# Patient Record
Sex: FEMALE | Race: WHITE | NOT HISPANIC OR LATINO | URBAN - METROPOLITAN AREA
[De-identification: names, ages, dates, MRNs, and addresses within clinical notes are randomized per-mention and may not be internally consistent; named-entity substitution may affect disease eponyms.]

---

## 2024-04-09 ENCOUNTER — INPATIENT (INPATIENT)
Facility: HOSPITAL | Age: 74
LOS: 9 days | Discharge: NURSING, REHAB OR LONG-TERM CARE | DRG: 536 | End: 2024-04-19
Attending: INTERNAL MEDICINE | Admitting: INTERNAL MEDICINE
Payer: MEDICARE

## 2024-04-09 VITALS
OXYGEN SATURATION: 86 % | SYSTOLIC BLOOD PRESSURE: 175 MMHG | TEMPERATURE: 98 F | DIASTOLIC BLOOD PRESSURE: 102 MMHG | RESPIRATION RATE: 25 BRPM | HEART RATE: 97 BPM | HEIGHT: 66 IN | WEIGHT: 87.96 LBS

## 2024-04-09 DIAGNOSIS — S72.002A FRACTURE OF UNSPECIFIED PART OF NECK OF LEFT FEMUR, INITIAL ENCOUNTER FOR CLOSED FRACTURE: ICD-10-CM

## 2024-04-09 DIAGNOSIS — Z29.9 ENCOUNTER FOR PROPHYLACTIC MEASURES, UNSPECIFIED: ICD-10-CM

## 2024-04-09 DIAGNOSIS — J44.9 CHRONIC OBSTRUCTIVE PULMONARY DISEASE, UNSPECIFIED: ICD-10-CM

## 2024-04-09 DIAGNOSIS — I10 ESSENTIAL (PRIMARY) HYPERTENSION: ICD-10-CM

## 2024-04-09 DIAGNOSIS — S72.009A FRACTURE OF UNSPECIFIED PART OF NECK OF UNSPECIFIED FEMUR, INITIAL ENCOUNTER FOR CLOSED FRACTURE: ICD-10-CM

## 2024-04-09 DIAGNOSIS — Z98.890 OTHER SPECIFIED POSTPROCEDURAL STATES: Chronic | ICD-10-CM

## 2024-04-09 DIAGNOSIS — F17.200 NICOTINE DEPENDENCE, UNSPECIFIED, UNCOMPLICATED: ICD-10-CM

## 2024-04-09 DIAGNOSIS — F41.9 ANXIETY DISORDER, UNSPECIFIED: ICD-10-CM

## 2024-04-09 DIAGNOSIS — E03.9 HYPOTHYROIDISM, UNSPECIFIED: ICD-10-CM

## 2024-04-09 DIAGNOSIS — M48.00 SPINAL STENOSIS, SITE UNSPECIFIED: ICD-10-CM

## 2024-04-09 DIAGNOSIS — R06.02 SHORTNESS OF BREATH: ICD-10-CM

## 2024-04-09 DIAGNOSIS — R63.4 ABNORMAL WEIGHT LOSS: ICD-10-CM

## 2024-04-09 LAB
ALBUMIN SERPL ELPH-MCNC: 3.1 G/DL — LOW (ref 3.5–5)
ALP SERPL-CCNC: 96 U/L — SIGNIFICANT CHANGE UP (ref 40–120)
ALT FLD-CCNC: 28 U/L DA — SIGNIFICANT CHANGE UP (ref 10–60)
ANION GAP SERPL CALC-SCNC: 6 MMOL/L — SIGNIFICANT CHANGE UP (ref 5–17)
APTT BLD: 29.7 SEC — SIGNIFICANT CHANGE UP (ref 24.5–35.6)
AST SERPL-CCNC: 34 U/L — SIGNIFICANT CHANGE UP (ref 10–40)
BASE EXCESS BLDV CALC-SCNC: 8.8 MMOL/L — SIGNIFICANT CHANGE UP
BASOPHILS # BLD AUTO: 0.02 K/UL — SIGNIFICANT CHANGE UP (ref 0–0.2)
BASOPHILS NFR BLD AUTO: 0.2 % — SIGNIFICANT CHANGE UP (ref 0–2)
BILIRUB SERPL-MCNC: 0.7 MG/DL — SIGNIFICANT CHANGE UP (ref 0.2–1.2)
BLD GP AB SCN SERPL QL: SIGNIFICANT CHANGE UP
BUN SERPL-MCNC: 14 MG/DL — SIGNIFICANT CHANGE UP (ref 7–18)
CALCIUM SERPL-MCNC: 9.2 MG/DL — SIGNIFICANT CHANGE UP (ref 8.4–10.5)
CHLORIDE SERPL-SCNC: 95 MMOL/L — LOW (ref 96–108)
CO2 SERPL-SCNC: 29 MMOL/L — SIGNIFICANT CHANGE UP (ref 22–31)
CREAT SERPL-MCNC: 0.47 MG/DL — LOW (ref 0.5–1.3)
EGFR: 100 ML/MIN/1.73M2 — SIGNIFICANT CHANGE UP
EOSINOPHIL # BLD AUTO: 0.01 K/UL — SIGNIFICANT CHANGE UP (ref 0–0.5)
EOSINOPHIL NFR BLD AUTO: 0.1 % — SIGNIFICANT CHANGE UP (ref 0–6)
GLUCOSE SERPL-MCNC: 130 MG/DL — HIGH (ref 70–99)
HCO3 BLDV-SCNC: 34 MMOL/L — HIGH (ref 22–29)
HCT VFR BLD CALC: 38.6 % — SIGNIFICANT CHANGE UP (ref 34.5–45)
HGB BLD-MCNC: 13.1 G/DL — SIGNIFICANT CHANGE UP (ref 11.5–15.5)
IMM GRANULOCYTES NFR BLD AUTO: 0.4 % — SIGNIFICANT CHANGE UP (ref 0–0.9)
INR BLD: 1.04 RATIO — SIGNIFICANT CHANGE UP (ref 0.85–1.18)
LYMPHOCYTES # BLD AUTO: 0.94 K/UL — LOW (ref 1–3.3)
LYMPHOCYTES # BLD AUTO: 10.1 % — LOW (ref 13–44)
MAGNESIUM SERPL-MCNC: 1.8 MG/DL — SIGNIFICANT CHANGE UP (ref 1.6–2.6)
MCHC RBC-ENTMCNC: 31.8 PG — SIGNIFICANT CHANGE UP (ref 27–34)
MCHC RBC-ENTMCNC: 33.9 GM/DL — SIGNIFICANT CHANGE UP (ref 32–36)
MCV RBC AUTO: 93.7 FL — SIGNIFICANT CHANGE UP (ref 80–100)
MONOCYTES # BLD AUTO: 1.21 K/UL — HIGH (ref 0–0.9)
MONOCYTES NFR BLD AUTO: 12.9 % — SIGNIFICANT CHANGE UP (ref 2–14)
NEUTROPHILS # BLD AUTO: 7.13 K/UL — SIGNIFICANT CHANGE UP (ref 1.8–7.4)
NEUTROPHILS NFR BLD AUTO: 76.3 % — SIGNIFICANT CHANGE UP (ref 43–77)
NRBC # BLD: 0 /100 WBCS — SIGNIFICANT CHANGE UP (ref 0–0)
PCO2 BLDV: 45 MMHG — HIGH (ref 39–42)
PH BLDV: 7.48 — HIGH (ref 7.32–7.43)
PLATELET # BLD AUTO: 208 K/UL — SIGNIFICANT CHANGE UP (ref 150–400)
PO2 BLDV: 46 MMHG — SIGNIFICANT CHANGE UP
POTASSIUM SERPL-MCNC: 3.6 MMOL/L — SIGNIFICANT CHANGE UP (ref 3.5–5.3)
POTASSIUM SERPL-SCNC: 3.6 MMOL/L — SIGNIFICANT CHANGE UP (ref 3.5–5.3)
PROT SERPL-MCNC: 7 G/DL — SIGNIFICANT CHANGE UP (ref 6–8.3)
PROTHROM AB SERPL-ACNC: 11.8 SEC — SIGNIFICANT CHANGE UP (ref 9.5–13)
RAPID RVP RESULT: SIGNIFICANT CHANGE UP
RBC # BLD: 4.12 M/UL — SIGNIFICANT CHANGE UP (ref 3.8–5.2)
RBC # FLD: 12.7 % — SIGNIFICANT CHANGE UP (ref 10.3–14.5)
SAO2 % BLDV: 77.1 % — SIGNIFICANT CHANGE UP
SARS-COV-2 RNA SPEC QL NAA+PROBE: SIGNIFICANT CHANGE UP
SODIUM SERPL-SCNC: 130 MMOL/L — LOW (ref 135–145)
TROPONIN I, HIGH SENSITIVITY RESULT: 22 NG/L — SIGNIFICANT CHANGE UP
WBC # BLD: 9.35 K/UL — SIGNIFICANT CHANGE UP (ref 3.8–10.5)
WBC # FLD AUTO: 9.35 K/UL — SIGNIFICANT CHANGE UP (ref 3.8–10.5)

## 2024-04-09 PROCEDURE — 70450 CT HEAD/BRAIN W/O DYE: CPT | Mod: 26,MC

## 2024-04-09 PROCEDURE — 72125 CT NECK SPINE W/O DYE: CPT | Mod: 26,MC

## 2024-04-09 PROCEDURE — 73552 X-RAY EXAM OF FEMUR 2/>: CPT | Mod: 26,LT

## 2024-04-09 PROCEDURE — 99285 EMERGENCY DEPT VISIT HI MDM: CPT

## 2024-04-09 PROCEDURE — 71250 CT THORAX DX C-: CPT | Mod: 26,MC

## 2024-04-09 PROCEDURE — 72170 X-RAY EXAM OF PELVIS: CPT | Mod: 26

## 2024-04-09 PROCEDURE — 72192 CT PELVIS W/O DYE: CPT | Mod: 26,MC

## 2024-04-09 RX ORDER — AZITHROMYCIN 500 MG/1
TABLET, FILM COATED ORAL
Refills: 0 | Status: DISCONTINUED | OUTPATIENT
Start: 2024-04-09 | End: 2024-04-09

## 2024-04-09 RX ORDER — AZITHROMYCIN 500 MG/1
500 TABLET, FILM COATED ORAL ONCE
Refills: 0 | Status: COMPLETED | OUTPATIENT
Start: 2024-04-09 | End: 2024-04-09

## 2024-04-09 RX ORDER — SENNA PLUS 8.6 MG/1
2 TABLET ORAL AT BEDTIME
Refills: 0 | Status: DISCONTINUED | OUTPATIENT
Start: 2024-04-09 | End: 2024-04-16

## 2024-04-09 RX ORDER — OXYCODONE HYDROCHLORIDE 5 MG/1
5 TABLET ORAL EVERY 4 HOURS
Refills: 0 | Status: DISCONTINUED | OUTPATIENT
Start: 2024-04-09 | End: 2024-04-11

## 2024-04-09 RX ORDER — IPRATROPIUM/ALBUTEROL SULFATE 18-103MCG
3 AEROSOL WITH ADAPTER (GRAM) INHALATION EVERY 6 HOURS
Refills: 0 | Status: DISCONTINUED | OUTPATIENT
Start: 2024-04-09 | End: 2024-04-19

## 2024-04-09 RX ORDER — ACETAMINOPHEN 500 MG
1000 TABLET ORAL EVERY 8 HOURS
Refills: 0 | Status: DISCONTINUED | OUTPATIENT
Start: 2024-04-09 | End: 2024-04-13

## 2024-04-09 RX ORDER — NICOTINE POLACRILEX 2 MG
1 GUM BUCCAL DAILY
Refills: 0 | Status: DISCONTINUED | OUTPATIENT
Start: 2024-04-09 | End: 2024-04-16

## 2024-04-09 RX ORDER — PIPERACILLIN AND TAZOBACTAM 4; .5 G/20ML; G/20ML
3.38 INJECTION, POWDER, LYOPHILIZED, FOR SOLUTION INTRAVENOUS ONCE
Refills: 0 | Status: COMPLETED | OUTPATIENT
Start: 2024-04-10 | End: 2024-04-10

## 2024-04-09 RX ORDER — POLYETHYLENE GLYCOL 3350 17 G/17G
17 POWDER, FOR SOLUTION ORAL DAILY
Refills: 0 | Status: DISCONTINUED | OUTPATIENT
Start: 2024-04-09 | End: 2024-04-16

## 2024-04-09 RX ORDER — NALOXONE HYDROCHLORIDE 4 MG/.1ML
0.4 SPRAY NASAL ONCE
Refills: 0 | Status: DISCONTINUED | OUTPATIENT
Start: 2024-04-09 | End: 2024-04-16

## 2024-04-09 RX ORDER — PIPERACILLIN AND TAZOBACTAM 4; .5 G/20ML; G/20ML
3.38 INJECTION, POWDER, LYOPHILIZED, FOR SOLUTION INTRAVENOUS ONCE
Refills: 0 | Status: COMPLETED | OUTPATIENT
Start: 2024-04-09 | End: 2024-04-09

## 2024-04-09 RX ORDER — ALPRAZOLAM 0.25 MG
0.5 TABLET ORAL DAILY
Refills: 0 | Status: DISCONTINUED | OUTPATIENT
Start: 2024-04-09 | End: 2024-04-10

## 2024-04-09 RX ORDER — ALBUTEROL 90 UG/1
2 AEROSOL, METERED ORAL EVERY 6 HOURS
Refills: 0 | Status: DISCONTINUED | OUTPATIENT
Start: 2024-04-09 | End: 2024-04-16

## 2024-04-09 RX ORDER — ENOXAPARIN SODIUM 100 MG/ML
40 INJECTION SUBCUTANEOUS ONCE
Refills: 0 | Status: DISCONTINUED | OUTPATIENT
Start: 2024-04-09 | End: 2024-04-09

## 2024-04-09 RX ORDER — LIDOCAINE 4 G/100G
1 CREAM TOPICAL DAILY
Refills: 0 | Status: DISCONTINUED | OUTPATIENT
Start: 2024-04-09 | End: 2024-04-13

## 2024-04-09 RX ORDER — ACETAMINOPHEN 500 MG
600 TABLET ORAL ONCE
Refills: 0 | Status: COMPLETED | OUTPATIENT
Start: 2024-04-09 | End: 2024-04-09

## 2024-04-09 RX ORDER — BUDESONIDE AND FORMOTEROL FUMARATE DIHYDRATE 160; 4.5 UG/1; UG/1
2 AEROSOL RESPIRATORY (INHALATION)
Refills: 0 | Status: DISCONTINUED | OUTPATIENT
Start: 2024-04-09 | End: 2024-04-12

## 2024-04-09 RX ORDER — GABAPENTIN 400 MG/1
100 CAPSULE ORAL EVERY 8 HOURS
Refills: 0 | Status: DISCONTINUED | OUTPATIENT
Start: 2024-04-09 | End: 2024-04-10

## 2024-04-09 RX ORDER — LOSARTAN POTASSIUM 100 MG/1
25 TABLET, FILM COATED ORAL DAILY
Refills: 0 | Status: DISCONTINUED | OUTPATIENT
Start: 2024-04-09 | End: 2024-04-13

## 2024-04-09 RX ORDER — OXYCODONE HYDROCHLORIDE 5 MG/1
2.5 TABLET ORAL EVERY 4 HOURS
Refills: 0 | Status: DISCONTINUED | OUTPATIENT
Start: 2024-04-09 | End: 2024-04-10

## 2024-04-09 RX ORDER — CEFTRIAXONE 500 MG/1
1000 INJECTION, POWDER, FOR SOLUTION INTRAMUSCULAR; INTRAVENOUS EVERY 24 HOURS
Refills: 0 | Status: DISCONTINUED | OUTPATIENT
Start: 2024-04-09 | End: 2024-04-09

## 2024-04-09 RX ORDER — LEVOTHYROXINE SODIUM 125 MCG
112 TABLET ORAL DAILY
Refills: 0 | Status: DISCONTINUED | OUTPATIENT
Start: 2024-04-09 | End: 2024-04-16

## 2024-04-09 RX ORDER — ENOXAPARIN SODIUM 100 MG/ML
40 INJECTION SUBCUTANEOUS EVERY 24 HOURS
Refills: 0 | Status: DISCONTINUED | OUTPATIENT
Start: 2024-04-09 | End: 2024-04-11

## 2024-04-09 RX ORDER — CYCLOBENZAPRINE HYDROCHLORIDE 10 MG/1
5 TABLET, FILM COATED ORAL EVERY 8 HOURS
Refills: 0 | Status: DISCONTINUED | OUTPATIENT
Start: 2024-04-09 | End: 2024-04-16

## 2024-04-09 RX ORDER — PIPERACILLIN AND TAZOBACTAM 4; .5 G/20ML; G/20ML
3.38 INJECTION, POWDER, LYOPHILIZED, FOR SOLUTION INTRAVENOUS EVERY 8 HOURS
Refills: 0 | Status: DISCONTINUED | OUTPATIENT
Start: 2024-04-10 | End: 2024-04-16

## 2024-04-09 RX ADMIN — Medication 1000 MILLIGRAM(S): at 21:04

## 2024-04-09 RX ADMIN — SENNA PLUS 2 TABLET(S): 8.6 TABLET ORAL at 21:04

## 2024-04-09 RX ADMIN — Medication 600 MILLIGRAM(S): at 17:35

## 2024-04-09 RX ADMIN — GABAPENTIN 100 MILLIGRAM(S): 400 CAPSULE ORAL at 21:04

## 2024-04-09 RX ADMIN — Medication 1000 MILLIGRAM(S): at 21:34

## 2024-04-09 RX ADMIN — AZITHROMYCIN 500 MILLIGRAM(S): 500 TABLET, FILM COATED ORAL at 21:03

## 2024-04-09 RX ADMIN — Medication 600 MILLIGRAM(S): at 17:05

## 2024-04-09 RX ADMIN — OXYCODONE HYDROCHLORIDE 5 MILLIGRAM(S): 5 TABLET ORAL at 21:34

## 2024-04-09 RX ADMIN — Medication 240 MILLIGRAM(S): at 17:05

## 2024-04-09 RX ADMIN — Medication 3 MILLILITER(S): at 21:04

## 2024-04-09 RX ADMIN — OXYCODONE HYDROCHLORIDE 5 MILLIGRAM(S): 5 TABLET ORAL at 21:04

## 2024-04-09 NOTE — H&P ADULT - ASSESSMENT
73-year-old female from home ambulates with a walker at baseline with PMH of HTN, COPD ( not on O2 at home), current smoker with 60 ppy hx, hypothyroidism, spinal stenosis urinary incontinence, presenting with left-sided lower back and hip pain after fall 2 weeks ago. In the ED, pt is comfortable, but complains of pain, NAD, pt is wheezing b/l with R sided crackles, pt was found to be hypoxic on RA to 80s saturating well on 1-2 L NC, hypertensive 175/105, wbc 9, Na 130. CTH and cervical spine shows No gross acute intracranial hemorrhage. No gross acute fracture cervical spine. CTC shows Loss of height of multiple thoracic and lumbar spine vertebral bodies which are of indeterminate age. Dominant cluster of nodular and patchy opacities within the superior segment of the right lower lobe suggestive of infection with endobronchial spread. Emphysema. Intrahepatic and extrahepatic biliary ductal dilatation is of unclear etiology. CT pelvis shows Acute proximal left femoral fracture,  Age-indeterminate severe compression fracture of the L5 vertebral body. Pt is admitted for L hip fracture/ SOB 2/2 PNA vs COPD exacerbation.    Primary team to confirm med recs

## 2024-04-09 NOTE — H&P ADULT - PROBLEM SELECTOR PLAN 8
pt is current smoker  smokes 3 cigarettes a day  with hx of 60 + ppy  c/w nicotine patches  pt was counseled on smoking cessation

## 2024-04-09 NOTE — ED ADULT TRIAGE NOTE - CHIEF COMPLAINT QUOTE
s/p multiple falls , last fall this AM   c/o pain all over the body   noted with shortness of breath

## 2024-04-09 NOTE — H&P ADULT - NSHPREVIEWOFSYSTEMS_GEN_ALL_CORE
CONSTITUTIONAL: No fever, weight loss, or fatigue  EYES: No eye pain, visual disturbances, or discharge  ENT:  No difficulty hearing, tinnitus, vertigo; No sinus or throat pain  NECK: No pain or stiffness  RESPIRATORY: No cough, wheezing, chills or hemoptysis; + Shortness of Breath  CARDIOVASCULAR: No chest pain, palpitations, passing out, dizziness, or leg swelling  GASTROINTESTINAL: No abdominal or epigastric pain. No nausea, vomiting, or hematemesis; No diarrhea or constipation. No melena or hematochezia.  GENITOURINARY: No dysuria, frequency, hematuria, or incontinence  NEUROLOGICAL: No headaches, memory loss, loss of strength, numbness, or tremors  SKIN: No itching, burning, rashes, or lesions   LYMPH Nodes: No enlarged glands  ENDOCRINE: No heat or cold intolerance; No hair loss  MUSCULOSKELETAL: LLE pain and inability to ambulate due to pain.  PSYCHIATRIC: No depression, anxiety, mood swings, or difficulty sleeping  HEME/LYMPH: No easy bruising, or bleeding gums  ALLERGY AND IMMUNOLOGIC: No hives or eczema

## 2024-04-09 NOTE — H&P ADULT - PROBLEM SELECTOR PLAN 4
pt states that she lost about 30 lbs in the last 2 months  She is undergoing colonoscopy o/p to evaluate for weight loss  pt denies any blood in the stool  family history of colon ca in her father  f/u CA markers  pt to continue following o/p with colonoscopy pt states that she lost about 30 lbs in the last 2 months  She is undergoing colonoscopy o/p to evaluate for weight loss  pt denies any blood in the stool  family history of colon ca in her father  f/u CA markers  pt to continue following o/p with colonoscopy  f/u CT C/A/P w IV con r/o malignancy

## 2024-04-09 NOTE — H&P ADULT - HISTORY OF PRESENT ILLNESS
73-year-old female from home ambulates with a walker at baseline with PMH of HTN, COPD ( not on O2 at home), current smoker with 60 ppy hx, hypothyroidism, spinal stenosis urinary incontinence, presenting with left-sided lower back and hip pain after fall 2 weeks ago. Approximately 1-2 weeks ago the patient fell while walking and her leg "gave out" and since then she has had left-sided leg pain.  Patient reports she is able to walk with her cane/walker but feels very weak and unbalanced and has since fallen 2 more times. Pt also complains of SOB. Patient reporting chest pain since the fall today after her cane hit her chest. Patient denies any headache, dizziness, cough, congestion, fever, chills, N,V,D,Abd pain, numbness or tingling. In the ED, pt is comfortable, but complains of pain, NAD, pt is wheezing b/l with R sided crackles, pt was found to be hypoxic on RA to 80s saturating well on 1-2 L NC, hypertensive 175/105, wbc 9, Na 130. CTH and cervical spine shows No gross acute intracranial hemorrhage. No gross acute fracture cervical spine. CTC shows Loss of height of multiple thoracic and lumbar spine vertebral bodies which are of indeterminate age. Dominant cluster of nodular and patchy opacities within the superior segment of the right lower lobe suggestive of infection with endobronchial spread. Emphysema. Intrahepatic and extrahepatic biliary ductal dilatation is of unclear etiology. CT pelvis shows Acute proximal left femoral fracture,  Age-indeterminate severe compression fracture of the L5 vertebral body. Pt is admitted for L hip fracture/ SOB 2/2 PNA vs COPD exacerbation.    Off note: pt states that she lost about 30 lbs in the last 2 months. She is undergoing colonoscopy o/p to evaluate for weight loss. pt denies any blood in the stool. However, she has family history of colon ca in her father.

## 2024-04-09 NOTE — ED ADULT NURSE NOTE - CHPI ED NUR SYMPTOMS NEG
no abrasion/no bleeding/no confusion/no deformity/no fever/no loss of consciousness/no tingling/no vomiting

## 2024-04-09 NOTE — ED ADULT TRIAGE NOTE - SOURCE OF INFORMATION
Nuclear Sclerotic Cataract OU: Explained how cataracts can effect vision. Recommend clinical observation. The patient was advised to contact us if any change or worsening of vision. Patient/EMS

## 2024-04-09 NOTE — ED ADULT NURSE NOTE - OBJECTIVE STATEMENT
pt c/o generalized weakness worse in her right leg. Gradually has become weaker in the legs resulting in multiple falls over the last few weeks the most recent being this AM. States she did hit her face. Dark purple bruise under right eye. Hx of COPD not on home o2. States her legs have been getting numb over the last few weeks.

## 2024-04-09 NOTE — ED PROVIDER NOTE - OBJECTIVE STATEMENT
73-year-old female, PMH of HTN, COPD, presenting with left-sided lower back and hip pain after fall.  History provided by patient and patient's daughter at bedside.  Approxi-1 week ago the patient fell and since then she has had left-sided leg pain.  Patient reports she is able to walk with her cane but feels very weak and unbalanced and has since fallen 2 more times.  Patient denies any headache, dizziness, or trouble breathing.  Not on any blood thinning medication.  Patient lives at home with her  but they do not have any other assistance at home. Patient reporting chest pain since the fall today after her cane hit her chest.  Also feels like she is short of breath.

## 2024-04-09 NOTE — H&P ADULT - PROBLEM SELECTOR PLAN 1
p/w s/p fall and LLE pain and ambulatory dysfunction  CTH and cervical spine shows No gross acute intracranial hemorrhage. No gross acute fracture cervical spine  CT pelvis shows Acute proximal left femoral fracture,  Age-indeterminate severe compression fracture of the L5 vertebral body  ortho consulted  nonWB on the LLE  fall risk precautions  c/w pain management with tylenol, gabapentin, lidocaine patch, flexeril and oxycodone  consult PT after procedure  procedure as per ortho  pt with hx of COPD, current smoker and requiring oxygen at this time  surgery to be postponed until current condition improves   RCRI 3.9 % risk  PERRY 1.4% risk

## 2024-04-09 NOTE — H&P ADULT - PROBLEM SELECTOR PLAN 2
p/w SOB  pt with hx of COPD not on home O2, and current smoker  pt is hypoxic on RA to 80s  requiring 1-2 L NC  pt is wheezing on examination with R sided crackles  CTC shows Loss of height of multiple thoracic and lumbar spine vertebral bodies which are of indeterminate age. Dominant cluster of nodular and patchy opacities within the superior segment of the right lower lobe suggestive of infection with endobronchial spread. Emphysema.  SOB likely PNA vs COPD exacerbation  Start azithromycin 500mg qd + rocephin 1g qd  Send strep ag, legionella, RVP, procalcitonin, mycoplasma igm  tylenol prn  start albuterol, duonebs, prednisone 40 mg qd, and symbicort p/w SOB  pt with hx of COPD not on home O2, and current smoker  pt is hypoxic on RA to 80s  requiring 1-2 L NC  pt is wheezing on examination with R sided crackles  CTC shows Loss of height of multiple thoracic and lumbar spine vertebral bodies which are of indeterminate age. Dominant cluster of nodular and patchy opacities within the superior segment of the right lower lobe suggestive of infection with endobronchial spread. Emphysema.  SOB likely PNA vs COPD exacerbation  Start zosyn for empiric coverage for possible asp pna  Send strep ag, legionella, RVP, procalcitonin, mycoplasma igm  tylenol prn  start albuterol, duonebs, prednisone 40 mg qd, and symbicort

## 2024-04-09 NOTE — H&P ADULT - NSICDXPASTMEDICALHX_GEN_ALL_CORE_FT
PAST MEDICAL HISTORY:  COPD (chronic obstructive pulmonary disease)     Current smoker     HTN (hypertension)     Hypothyroidism     Spinal stenosis

## 2024-04-09 NOTE — CONSULT NOTE ADULT - SUBJECTIVE AND OBJECTIVE BOX
ROXY FAN  MRN-[id number]     ORTHOPEDIC CONSULT:    Diagnosis:  L Hip Basicervical Fracture     73yFemale    73-year-old female, PMH of HTN, COPD, presenting with left-sided lower back and hip pain after fall.  History provided by patient and patient's  at bedside.  Approximately 1-2 weeks ago the patient fell while walking and her leg "gave out" and since then she has had left-sided leg pain.  Patient reports she is able to walk with her cane/walker but feels very weak and unbalanced and has since fallen 2 more times.  Patient denies any headache, dizziness, or trouble breathing.  Not on any blood thinning medication.  Patient lives at home with her  but they do not have any other assistance at home. Patient reporting chest pain since the fall today after her cane hit her chest.   Pain is well controlled to the LLE. Left hip pain comes on with motion and alleviated with rest.     Vital Signs Last 24 Hrs  T(C): 36.8 (09 Apr 2024 15:01), Max: 36.8 (09 Apr 2024 15:01)  T(F): 98.2 (09 Apr 2024 15:01), Max: 98.2 (09 Apr 2024 15:01)  HR: 97 (09 Apr 2024 15:01) (97 - 97)  BP: 175/102 (09 Apr 2024 15:01) (175/102 - 175/102)  BP(mean): --  RR: 25 (09 Apr 2024 15:01) (25 - 25)  SpO2: 86% (09 Apr 2024 15:01) (86% - 86%)    Parameters below as of 09 Apr 2024 15:01  Patient On (Oxygen Delivery Method): room air      I&O's Detail      Physical Exam:    General: AAOx3, NAD, resting comfortably in bed.    L Hip: Skin is pink and warm. Tender at the fracture site. Decreased ROM of the affected hip due to pain. SILT.  Positive logroll test.  Lower extremity:  No calf tenderness, calves are soft. 2+pulses. NVI. (+)EHL/FHL/ADF/APF. Noted to have chronic LE skin changes from stasis, Good capillary refill. Warm, well-perfused. SILT.                          13.1   9.35  )-----------( 208      ( 09 Apr 2024 15:48 )             38.6     04-09    130<L>  |  95<L>  |  14  ----------------------------<  130<H>  3.6   |  29  |  0.47<L>    Ca    9.2      09 Apr 2024 15:48  Mg     1.8     04-09    TPro  7.0  /  Alb  3.1<L>  /  TBili  0.7  /  DBili  x   /  AST  34  /  ALT  28  /  AlkPhos  96  04-09      Urinalysis Basic - ( 09 Apr 2024 15:48 )    Color: x / Appearance: x / SG: x / pH: x  Gluc: 130 mg/dL / Ketone: x  / Bili: x / Urobili: x   Blood: x / Protein: x / Nitrite: x   Leuk Esterase: x / RBC: x / WBC x   Sq Epi: x / Non Sq Epi: x / Bacteria: x    CT scan: left hip basicervical fx      Impression:  72 y/o F with L Hip Basicervical Fracture    Plan:  -  D/w Dr. Lang- Recommends left hip IM nailing   -  Pain control  -  DVT prophylaxis  -  OR likely 4/11/2024  -  Medical Optimization  -  Consent: Pending  -  NWB to LLE  -  Case d/w Dr. Lang and ER staff

## 2024-04-09 NOTE — H&P ADULT - NSHPPHYSICALEXAM_GEN_ALL_CORE
GENERAL: NAD, well-groomed, well-developed  HEAD:  Atraumatic, Normocephalic  EYES: EOMI, PERRLA, conjunctiva and sclera clear  ENMT: No tonsillar erythema, exudates, or enlargement; Moist mucous membranes, Good dentition, No lesions  NECK: Supple, normal appearance, No JVD; Normal thyroid; Trachea midline  NERVOUS SYSTEM:  Alert & Oriented X3,  Motor Strength 5/5 B/L upper and R lower extremities, LLE motor strength is limited due to pain, sensation intact  CHEST/LUNG: Lungs wheezing bilaterally with crackles at the R side, No rales, rhonchi, wheezing   HEART: Regular rate and rhythm; No murmurs, rubs, or gallops  ABDOMEN: Soft, Nontender, Nondistended; Bowel sounds present  EXTREMITIES:  LLE tenderness to palpation at the hip, inability to move LLE, pulses intact  LYMPH: No lymphadenopathy noted  SKIN: No rashes or lesions;  Good capillary refill

## 2024-04-09 NOTE — ED PROVIDER NOTE - PHYSICAL EXAMINATION
General: well appearing female, no acute distress   HEENT: right periorbital ecchymosis   Respiratory: normal work of breathing, lungs clear to auscultation bilaterally   Cardiac: regular rate and rhythm   Abdomen: soft, non-tender, no guarding or rebound   MSK: no midline spinal tenderness to palpation, LLE shortened, 3/5 strength along LLE  Skin: warm, dry   Neuro: A&Ox3  Psych: appropriate affect

## 2024-04-09 NOTE — ED PROVIDER NOTE - CLINICAL SUMMARY MEDICAL DECISION MAKING FREE TEXT BOX
73-year female presenting with left-sided leg and hip pain after multiple falls.  Patient also complaining of shortness of breath but lungs clear on exam.  Concern for hip fracture as well as deconditioning.  Patient will likely require admission given inability to walk and therefore unsafe discharge.  Patient will likely need PT or arrangements made for home health aides at home.

## 2024-04-10 DIAGNOSIS — J96.01 ACUTE RESPIRATORY FAILURE WITH HYPOXIA: ICD-10-CM

## 2024-04-10 LAB
AFP-TM SERPL-MCNC: <1.8 NG/ML — SIGNIFICANT CHANGE UP
ALBUMIN SERPL ELPH-MCNC: 3 G/DL — LOW (ref 3.5–5)
ALP SERPL-CCNC: 91 U/L — SIGNIFICANT CHANGE UP (ref 40–120)
ALT FLD-CCNC: 24 U/L DA — SIGNIFICANT CHANGE UP (ref 10–60)
ANION GAP SERPL CALC-SCNC: 7 MMOL/L — SIGNIFICANT CHANGE UP (ref 5–17)
APTT BLD: 29.3 SEC — SIGNIFICANT CHANGE UP (ref 24.5–35.6)
AST SERPL-CCNC: 28 U/L — SIGNIFICANT CHANGE UP (ref 10–40)
BASOPHILS # BLD AUTO: 0.02 K/UL — SIGNIFICANT CHANGE UP (ref 0–0.2)
BASOPHILS NFR BLD AUTO: 0.2 % — SIGNIFICANT CHANGE UP (ref 0–2)
BCA 255 TISS QL IMSTN: 20.1 U/ML — SIGNIFICANT CHANGE UP
BILIRUB SERPL-MCNC: 0.9 MG/DL — SIGNIFICANT CHANGE UP (ref 0.2–1.2)
BLD GP AB SCN SERPL QL: SIGNIFICANT CHANGE UP
BUN SERPL-MCNC: 11 MG/DL — SIGNIFICANT CHANGE UP (ref 7–18)
CALCIUM SERPL-MCNC: 8.6 MG/DL — SIGNIFICANT CHANGE UP (ref 8.4–10.5)
CANCER AG125 SERPL-ACNC: 15 U/ML — SIGNIFICANT CHANGE UP
CANCER AG19-9 SERPL-ACNC: 18 U/ML — SIGNIFICANT CHANGE UP
CANCER AG27-29 SERPL-ACNC: 22.6 U/ML — SIGNIFICANT CHANGE UP (ref 0–38.6)
CEA SERPL-MCNC: 3.8 NG/ML — SIGNIFICANT CHANGE UP (ref 0–3.8)
CHLORIDE SERPL-SCNC: 90 MMOL/L — LOW (ref 96–108)
CK SERPL-CCNC: 213 U/L — SIGNIFICANT CHANGE UP (ref 21–215)
CO2 SERPL-SCNC: 32 MMOL/L — HIGH (ref 22–31)
CREAT SERPL-MCNC: 0.43 MG/DL — LOW (ref 0.5–1.3)
EGFR: 102 ML/MIN/1.73M2 — SIGNIFICANT CHANGE UP
EOSINOPHIL # BLD AUTO: 0.02 K/UL — SIGNIFICANT CHANGE UP (ref 0–0.5)
EOSINOPHIL NFR BLD AUTO: 0.2 % — SIGNIFICANT CHANGE UP (ref 0–6)
GLUCOSE SERPL-MCNC: 133 MG/DL — HIGH (ref 70–99)
HCT VFR BLD CALC: 38.2 % — SIGNIFICANT CHANGE UP (ref 34.5–45)
HCV AB S/CO SERPL IA: 1 S/CO — HIGH (ref 0–0.99)
HCV AB SERPL-IMP: REACTIVE
HGB BLD-MCNC: 12.9 G/DL — SIGNIFICANT CHANGE UP (ref 11.5–15.5)
IMM GRANULOCYTES NFR BLD AUTO: 0.4 % — SIGNIFICANT CHANGE UP (ref 0–0.9)
INR BLD: 0.95 RATIO — SIGNIFICANT CHANGE UP (ref 0.85–1.18)
LEGIONELLA AG UR QL: NEGATIVE — SIGNIFICANT CHANGE UP
LYMPHOCYTES # BLD AUTO: 1.05 K/UL — SIGNIFICANT CHANGE UP (ref 1–3.3)
LYMPHOCYTES # BLD AUTO: 12.7 % — LOW (ref 13–44)
M PNEUMO IGM SER-ACNC: 0.59 INDEX — SIGNIFICANT CHANGE UP (ref 0–0.9)
MAGNESIUM SERPL-MCNC: 1.7 MG/DL — SIGNIFICANT CHANGE UP (ref 1.6–2.6)
MCHC RBC-ENTMCNC: 31.2 PG — SIGNIFICANT CHANGE UP (ref 27–34)
MCHC RBC-ENTMCNC: 33.8 GM/DL — SIGNIFICANT CHANGE UP (ref 32–36)
MCV RBC AUTO: 92.5 FL — SIGNIFICANT CHANGE UP (ref 80–100)
MONOCYTES # BLD AUTO: 1.02 K/UL — HIGH (ref 0–0.9)
MONOCYTES NFR BLD AUTO: 12.3 % — SIGNIFICANT CHANGE UP (ref 2–14)
MYCOPLASMA AG SPEC QL: NEGATIVE — SIGNIFICANT CHANGE UP
NEUTROPHILS # BLD AUTO: 6.15 K/UL — SIGNIFICANT CHANGE UP (ref 1.8–7.4)
NEUTROPHILS NFR BLD AUTO: 74.2 % — SIGNIFICANT CHANGE UP (ref 43–77)
NRBC # BLD: 0 /100 WBCS — SIGNIFICANT CHANGE UP (ref 0–0)
NT-PROBNP SERPL-SCNC: 1635 PG/ML — HIGH (ref 0–450)
PHOSPHATE SERPL-MCNC: 3.4 MG/DL — SIGNIFICANT CHANGE UP (ref 2.5–4.5)
PLATELET # BLD AUTO: 190 K/UL — SIGNIFICANT CHANGE UP (ref 150–400)
POTASSIUM SERPL-MCNC: 3.5 MMOL/L — SIGNIFICANT CHANGE UP (ref 3.5–5.3)
POTASSIUM SERPL-SCNC: 3.5 MMOL/L — SIGNIFICANT CHANGE UP (ref 3.5–5.3)
PROT SERPL-MCNC: 6.9 G/DL — SIGNIFICANT CHANGE UP (ref 6–8.3)
PROTHROM AB SERPL-ACNC: 10.9 SEC — SIGNIFICANT CHANGE UP (ref 9.5–13)
RBC # BLD: 4.13 M/UL — SIGNIFICANT CHANGE UP (ref 3.8–5.2)
RBC # FLD: 12.8 % — SIGNIFICANT CHANGE UP (ref 10.3–14.5)
S PNEUM AG UR QL: NEGATIVE — SIGNIFICANT CHANGE UP
SODIUM SERPL-SCNC: 129 MMOL/L — LOW (ref 135–145)
TSH SERPL-MCNC: 5.38 UU/ML — HIGH (ref 0.34–4.82)
WBC # BLD: 8.29 K/UL — SIGNIFICANT CHANGE UP (ref 3.8–10.5)
WBC # FLD AUTO: 8.29 K/UL — SIGNIFICANT CHANGE UP (ref 3.8–10.5)

## 2024-04-10 PROCEDURE — 99223 1ST HOSP IP/OBS HIGH 75: CPT

## 2024-04-10 PROCEDURE — 70450 CT HEAD/BRAIN W/O DYE: CPT | Mod: 26

## 2024-04-10 RX ORDER — GABAPENTIN 400 MG/1
100 CAPSULE ORAL EVERY 8 HOURS
Refills: 0 | Status: DISCONTINUED | OUTPATIENT
Start: 2024-04-10 | End: 2024-04-16

## 2024-04-10 RX ORDER — CHLORHEXIDINE GLUCONATE 213 G/1000ML
1 SOLUTION TOPICAL
Refills: 0 | Status: DISCONTINUED | OUTPATIENT
Start: 2024-04-10 | End: 2024-04-16

## 2024-04-10 RX ADMIN — OXYCODONE HYDROCHLORIDE 5 MILLIGRAM(S): 5 TABLET ORAL at 12:29

## 2024-04-10 RX ADMIN — POLYETHYLENE GLYCOL 3350 17 GRAM(S): 17 POWDER, FOR SOLUTION ORAL at 11:03

## 2024-04-10 RX ADMIN — GABAPENTIN 100 MILLIGRAM(S): 400 CAPSULE ORAL at 13:19

## 2024-04-10 RX ADMIN — Medication 1000 MILLIGRAM(S): at 06:04

## 2024-04-10 RX ADMIN — Medication 100 MILLIGRAM(S): at 21:42

## 2024-04-10 RX ADMIN — PIPERACILLIN AND TAZOBACTAM 200 GRAM(S): 4; .5 INJECTION, POWDER, LYOPHILIZED, FOR SOLUTION INTRAVENOUS at 00:30

## 2024-04-10 RX ADMIN — Medication 1000 MILLIGRAM(S): at 22:32

## 2024-04-10 RX ADMIN — ENOXAPARIN SODIUM 40 MILLIGRAM(S): 100 INJECTION SUBCUTANEOUS at 10:53

## 2024-04-10 RX ADMIN — GABAPENTIN 100 MILLIGRAM(S): 400 CAPSULE ORAL at 05:53

## 2024-04-10 RX ADMIN — PIPERACILLIN AND TAZOBACTAM 25 GRAM(S): 4; .5 INJECTION, POWDER, LYOPHILIZED, FOR SOLUTION INTRAVENOUS at 09:33

## 2024-04-10 RX ADMIN — GABAPENTIN 100 MILLIGRAM(S): 400 CAPSULE ORAL at 21:39

## 2024-04-10 RX ADMIN — Medication 112 MICROGRAM(S): at 05:54

## 2024-04-10 RX ADMIN — Medication 3 MILLILITER(S): at 09:28

## 2024-04-10 RX ADMIN — OXYCODONE HYDROCHLORIDE 5 MILLIGRAM(S): 5 TABLET ORAL at 13:16

## 2024-04-10 RX ADMIN — Medication 1000 MILLIGRAM(S): at 15:52

## 2024-04-10 RX ADMIN — OXYCODONE HYDROCHLORIDE 5 MILLIGRAM(S): 5 TABLET ORAL at 18:04

## 2024-04-10 RX ADMIN — Medication 3 MILLILITER(S): at 20:31

## 2024-04-10 RX ADMIN — Medication 1 PATCH: at 11:02

## 2024-04-10 RX ADMIN — SENNA PLUS 2 TABLET(S): 8.6 TABLET ORAL at 21:39

## 2024-04-10 RX ADMIN — OXYCODONE HYDROCHLORIDE 2.5 MILLIGRAM(S): 5 TABLET ORAL at 04:52

## 2024-04-10 RX ADMIN — PIPERACILLIN AND TAZOBACTAM 25 GRAM(S): 4; .5 INJECTION, POWDER, LYOPHILIZED, FOR SOLUTION INTRAVENOUS at 13:20

## 2024-04-10 RX ADMIN — Medication 40 MILLIGRAM(S): at 05:53

## 2024-04-10 RX ADMIN — Medication 0.5 MILLIGRAM(S): at 11:03

## 2024-04-10 RX ADMIN — LIDOCAINE 1 PATCH: 4 CREAM TOPICAL at 18:39

## 2024-04-10 RX ADMIN — Medication 3 MILLILITER(S): at 15:05

## 2024-04-10 RX ADMIN — LOSARTAN POTASSIUM 25 MILLIGRAM(S): 100 TABLET, FILM COATED ORAL at 05:53

## 2024-04-10 RX ADMIN — Medication 1 PATCH: at 18:39

## 2024-04-10 RX ADMIN — OXYCODONE HYDROCHLORIDE 5 MILLIGRAM(S): 5 TABLET ORAL at 18:40

## 2024-04-10 RX ADMIN — PIPERACILLIN AND TAZOBACTAM 25 GRAM(S): 4; .5 INJECTION, POWDER, LYOPHILIZED, FOR SOLUTION INTRAVENOUS at 21:38

## 2024-04-10 RX ADMIN — Medication 3 MILLILITER(S): at 05:54

## 2024-04-10 RX ADMIN — Medication 1000 MILLIGRAM(S): at 21:42

## 2024-04-10 RX ADMIN — LIDOCAINE 1 PATCH: 4 CREAM TOPICAL at 11:02

## 2024-04-10 RX ADMIN — Medication 1000 MILLIGRAM(S): at 13:19

## 2024-04-10 RX ADMIN — PIPERACILLIN AND TAZOBACTAM 25 GRAM(S): 4; .5 INJECTION, POWDER, LYOPHILIZED, FOR SOLUTION INTRAVENOUS at 04:54

## 2024-04-10 RX ADMIN — OXYCODONE HYDROCHLORIDE 2.5 MILLIGRAM(S): 5 TABLET ORAL at 09:33

## 2024-04-10 NOTE — PROGRESS NOTE ADULT - PROBLEM SELECTOR PLAN 2
hx of COPD not on home O2, and current smoker  pt is hypoxic on RA to 80s  requiring 1-2 L NC  CTC shows Loss of height of multiple thoracic and lumbar spine vertebral bodies which are of indeterminate age. Dominant cluster of nodular and patchy opacities within the superior segment of the right lower lobe suggestive of infection with endobronchial spread. Emphysema.  SOB likely PNA vs COPD exacerbation  Send strep ag, legionella, RVP, procalcitonin, mycoplasma igm  tylenol prn  start albuterol, duonebs, prednisone 40 mg qd, and symbicort

## 2024-04-10 NOTE — CONSULT NOTE ADULT - SUBJECTIVE AND OBJECTIVE BOX
To be completed. BIBEMS from home yesterday.  History from Admission H&P    <Start of quote(s) from H&P>  "Reason for Admission: L hip fracture  History of Present Illness:   73-year-old female from home ambulates with a walker at baseline with PMH of HTN, COPD ( not on O2 at home), current smoker with 60 ppy hx, hypothyroidism, spinal stenosis urinary incontinence, presenting with left-sided lower back and hip pain after fall 2 weeks ago. Approximately 1-2 weeks ago the patient fell while walking and her leg "gave out" and since then she has had left-sided leg pain.  Patient reports she is able to walk with her cane/walker but feels very weak and unbalanced and has since fallen 2 more times. Pt also complains of SOB. Patient reporting chest pain since the fall today after her cane hit her chest. Patient denies any headache, dizziness, cough, congestion, fever, chills, N,V,D,Abd pain, numbness or tingling. In the ED, pt is comfortable, but complains of pain, NAD, pt is wheezing b/l with R sided crackles, pt was found to be hypoxic on RA to 80s saturating well on 1-2 L NC, hypertensive 175/105, wbc 9, Na 130. CTH and cervical spine shows No gross acute intracranial hemorrhage. No gross acute fracture cervical spine. CTC shows Loss of height of multiple thoracic and lumbar spine vertebral bodies which are of indeterminate age. Dominant cluster of nodular and patchy opacities within the superior segment of the right lower lobe suggestive of infection with endobronchial spread. Emphysema. Intrahepatic and extrahepatic biliary ductal dilatation is of unclear etiology. CT pelvis shows Acute proximal left femoral fracture,  Age-indeterminate severe compression fracture of the L5 vertebral body. Pt is admitted for L hip fracture/ SOB 2/2 PNA vs COPD exacerbation.    Off note: pt states that she lost about 30 lbs in the last 2 months. She is undergoing colonoscopy o/p to evaluate for weight loss. pt denies any blood in the stool. However, she has family history of colon ca in her father.      Review of Systems:  Review of Systems: CONSTITUTIONAL: No fever, weight loss, or fatigue  EYES: No eye pain, visual disturbances, or discharge  ENT:  No difficulty hearing, tinnitus, vertigo; No sinus or throat pain  NECK: No pain or stiffness  RESPIRATORY: No cough, wheezing, chills or hemoptysis; + Shortness of Breath  CARDIOVASCULAR: No chest pain, palpitations, passing out, dizziness, or leg swelling  GASTROINTESTINAL: No abdominal or epigastric pain. No nausea, vomiting, or hematemesis; No diarrhea or constipation. No melena or hematochezia.  GENITOURINARY: No dysuria, frequency, hematuria, or incontinence  NEUROLOGICAL: No headaches, memory loss, loss of strength, numbness, or tremors  SKIN: No itching, burning, rashes, or lesions   LYMPH Nodes: No enlarged glands  ENDOCRINE: No heat or cold intolerance; No hair loss  MUSCULOSKELETAL: LLE pain and inability to ambulate due to pain.  PSYCHIATRIC: No depression, anxiety, mood swings, or difficulty sleeping  HEME/LYMPH: No easy bruising, or bleeding gums  ALLERGY AND IMMUNOLOGIC: No hives or eczema   . . .     PAST MEDICAL HISTORY:  COPD (chronic obstructive pulmonary disease)   Current smoker   HTN (hypertension)   Hypothyroidism   Spinal stenosis.     PAST SURGICAL HISTORY:  H/O foot surgery.    . . .     · Alcohol use	denies  · Substance use	denies  · Tobacco use	smokes 3 cigarettes a day   with 60 ppy hx  · Social History (marital status, living situation, occupation, and sexual history)	lives with  ambulates with a cane or walker"  <End of quote(s) from H&P>         BIBEMS from home yesterday.  History from Admission H&P    <Start of quote(s) from H&P>  "Reason for Admission: L hip fracture  History of Present Illness:   73-year-old female from home ambulates with a walker at baseline with PMH of HTN, COPD ( not on O2 at home), current smoker with 60 ppy hx, hypothyroidism, spinal stenosis urinary incontinence, presenting with left-sided lower back and hip pain after fall 2 weeks ago. Approximately 1-2 weeks ago the patient fell while walking and her leg "gave out" and since then she has had left-sided leg pain.  Patient reports she is able to walk with her cane/walker but feels very weak and unbalanced and has since fallen 2 more times. Pt also complains of SOB. Patient reporting chest pain since the fall today after her cane hit her chest. Patient denies any headache, dizziness, cough, congestion, fever, chills, N,V,D,Abd pain, numbness or tingling. In the ED, pt is comfortable, but complains of pain, NAD, pt is wheezing b/l with R sided crackles, pt was found to be hypoxic on RA to 80s saturating well on 1-2 L NC, hypertensive 175/105, wbc 9, Na 130. CTH and cervical spine shows No gross acute intracranial hemorrhage. No gross acute fracture cervical spine. CTC shows Loss of height of multiple thoracic and lumbar spine vertebral bodies which are of indeterminate age. Dominant cluster of nodular and patchy opacities within the superior segment of the right lower lobe suggestive of infection with endobronchial spread. Emphysema. Intrahepatic and extrahepatic biliary ductal dilatation is of unclear etiology. CT pelvis shows Acute proximal left femoral fracture,  Age-indeterminate severe compression fracture of the L5 vertebral body. Pt is admitted for L hip fracture/ SOB 2/2 PNA vs COPD exacerbation.    Off note: pt states that she lost about 30 lbs in the last 2 months. She is undergoing colonoscopy o/p to evaluate for weight loss. pt denies any blood in the stool. However, she has family history of colon ca in her father.      Review of Systems:  Review of Systems: CONSTITUTIONAL: No fever, weight loss, or fatigue  EYES: No eye pain, visual disturbances, or discharge  ENT:  No difficulty hearing, tinnitus, vertigo; No sinus or throat pain  NECK: No pain or stiffness  RESPIRATORY: No cough, wheezing, chills or hemoptysis; + Shortness of Breath  CARDIOVASCULAR: No chest pain, palpitations, passing out, dizziness, or leg swelling  GASTROINTESTINAL: No abdominal or epigastric pain. No nausea, vomiting, or hematemesis; No diarrhea or constipation. No melena or hematochezia.  GENITOURINARY: No dysuria, frequency, hematuria, or incontinence  NEUROLOGICAL: No headaches, memory loss, loss of strength, numbness, or tremors  SKIN: No itching, burning, rashes, or lesions   LYMPH Nodes: No enlarged glands  ENDOCRINE: No heat or cold intolerance; No hair loss  MUSCULOSKELETAL: LLE pain and inability to ambulate due to pain.  PSYCHIATRIC: No depression, anxiety, mood swings, or difficulty sleeping  HEME/LYMPH: No easy bruising, or bleeding gums  ALLERGY AND IMMUNOLOGIC: No hives or eczema   . . .     PAST MEDICAL HISTORY:  COPD (chronic obstructive pulmonary disease)   Current smoker   HTN (hypertension)   Hypothyroidism   Spinal stenosis.     PAST SURGICAL HISTORY:  H/O foot surgery.    . . .     · Alcohol use	denies  · Substance use	denies  · Tobacco use	smokes 3 cigarettes a day   with 60 ppy hx  · Social History (marital status, living situation, occupation, and sexual history)	lives with  ambulates with a cane or walker"  <End of quote(s) from H&P>    Per discussion with Pt's daughter and husbandshe has had slowly progressively worsening weakness over a period of at least 5 years.  She has also been losing weight.  Has become essentially bed-bound.  Rightward head tilt is also chronic.      EXAMINATION    Awake, alert.  Semi-recumbent in bed; cachectic-appearing; head tilted to the R.  Grossly normal comprehension, expression, prosody, articulation.  Bilateral cataracts.  Impaired conjugate gaze to the L; impaired adduction OS (only the R eye looks fully rightward on pursuit or on saccades to target).      EXTREMELY weak all four limbs, with only briefly sustained effort, on testing limb muscles.  It is not possible to ascertain whether there are focal, lateralized, or topographically-distributed motor deficits.      Reflex                           Right    Left   Comment    Pectoralis                        0         1  Biceps                             0         1  Triceps                            0         0  Patellar                           0         0  Gastroc                           0         0  Plantar                      extensor  extensor    P/LT sensation grossly intact; no extinction on DSS.       BIBEMS from home yesterday.  History from Admission H&P    <Start of quote(s) from H&P>  "Reason for Admission: L hip fracture  History of Present Illness:   73-year-old female from home ambulates with a walker at baseline with PMH of HTN, COPD ( not on O2 at home), current smoker with 60 ppy hx, hypothyroidism, spinal stenosis urinary incontinence, presenting with left-sided lower back and hip pain after fall 2 weeks ago. Approximately 1-2 weeks ago the patient fell while walking and her leg "gave out" and since then she has had left-sided leg pain.  Patient reports she is able to walk with her cane/walker but feels very weak and unbalanced and has since fallen 2 more times. Pt also complains of SOB. Patient reporting chest pain since the fall today after her cane hit her chest. Patient denies any headache, dizziness, cough, congestion, fever, chills, N,V,D,Abd pain, numbness or tingling. In the ED, pt is comfortable, but complains of pain, NAD, pt is wheezing b/l with R sided crackles, pt was found to be hypoxic on RA to 80s saturating well on 1-2 L NC, hypertensive 175/105, wbc 9, Na 130. CTH and cervical spine shows No gross acute intracranial hemorrhage. No gross acute fracture cervical spine. CTC shows Loss of height of multiple thoracic and lumbar spine vertebral bodies which are of indeterminate age. Dominant cluster of nodular and patchy opacities within the superior segment of the right lower lobe suggestive of infection with endobronchial spread. Emphysema. Intrahepatic and extrahepatic biliary ductal dilatation is of unclear etiology. CT pelvis shows Acute proximal left femoral fracture,  Age-indeterminate severe compression fracture of the L5 vertebral body. Pt is admitted for L hip fracture/ SOB 2/2 PNA vs COPD exacerbation.    Off note: pt states that she lost about 30 lbs in the last 2 months. She is undergoing colonoscopy o/p to evaluate for weight loss. pt denies any blood in the stool. However, she has family history of colon ca in her father.      Review of Systems:  Review of Systems: CONSTITUTIONAL: No fever, weight loss, or fatigue  EYES: No eye pain, visual disturbances, or discharge  ENT:  No difficulty hearing, tinnitus, vertigo; No sinus or throat pain  NECK: No pain or stiffness  RESPIRATORY: No cough, wheezing, chills or hemoptysis; + Shortness of Breath  CARDIOVASCULAR: No chest pain, palpitations, passing out, dizziness, or leg swelling  GASTROINTESTINAL: No abdominal or epigastric pain. No nausea, vomiting, or hematemesis; No diarrhea or constipation. No melena or hematochezia.  GENITOURINARY: No dysuria, frequency, hematuria, or incontinence  NEUROLOGICAL: No headaches, memory loss, loss of strength, numbness, or tremors  SKIN: No itching, burning, rashes, or lesions   LYMPH Nodes: No enlarged glands  ENDOCRINE: No heat or cold intolerance; No hair loss  MUSCULOSKELETAL: LLE pain and inability to ambulate due to pain.  PSYCHIATRIC: No depression, anxiety, mood swings, or difficulty sleeping  HEME/LYMPH: No easy bruising, or bleeding gums  ALLERGY AND IMMUNOLOGIC: No hives or eczema   . . .     PAST MEDICAL HISTORY:  COPD (chronic obstructive pulmonary disease)   Current smoker   HTN (hypertension)   Hypothyroidism   Spinal stenosis.     PAST SURGICAL HISTORY:  H/O foot surgery.    . . .     · Alcohol use	denies  · Substance use	denies  · Tobacco use	smokes 3 cigarettes a day   with 60 ppy hx  · Social History (marital status, living situation, occupation, and sexual history)	lives with  ambulates with a cane or walker"  <End of quote(s) from H&P>    Per discussion with Pt's daughter and husbandshe has had slowly progressively worsening weakness over a period of at least 5 years.  She has also been losing weight.  Has become essentially bed-bound.  Rightward head tilt is also chronic.      EXAMINATION    Awake, alert.  Semi-recumbent in bed; cachectic-appearing; head tilted to the R.  Grossly normal comprehension, expression, prosody.  Mildly dysarthric speech.  Bilateral cataracts.  Impaired conjugate gaze to the L; impaired adduction OS (only the R eye looks fully rightward on pursuit or on saccades to target).      EXTREMELY weak all four limbs, with only briefly sustained effort, on testing limb muscles.  It is not possible to ascertain whether there are focal, lateralized, or topographically-distributed motor deficits.      Reflex                           Right    Left   Comment    Pectoralis                        0         1  Biceps                             0         1  Triceps                            0         0  Patellar                           0         0  Gastroc                           0         0  Plantar                      extensor  extensor    P/LT sensation grossly intact; no extinction on DSS.

## 2024-04-10 NOTE — CONSULT NOTE ADULT - ASSESSMENT
Ms Vasquez is a pleasant 73 year old female admitted with left hip fx s/p fall and PNA and acute weight loss    1. Acute weight loss  ·	Patient following with Dr. Lundberg from [facility]  ·	Patient was getting outpatient workup for lung nodule and acute weight loss  ·	Planning for a PET but now admitted  ·	Patient had CT chest which showed PNA  ·	Obtain CT abd to see if there is any malignancy  ·	History of osteoporosis and vertebrae collapse which was seen on the CT scan- multiple thoracic and lumbar spine vertebral bodies loss of height.  ·	CT chest also showed a biliary duct dilatation.  CT abd to further characterize or consider MRCP  ·	Planning ongoing treatment for PNA with abx and possible surgical procedure later this week  ·	Cont with surgical recs and primary team management.    Will continue to follow

## 2024-04-10 NOTE — SWALLOW BEDSIDE ASSESSMENT ADULT - CONSISTENCIES ADMINISTERED
applesauce x4 tspns/pureed ice chips x2, water cup sip x1 & 1 tspn/thin liquid x4 tspns, 2 cup sips/mildly thick applesauce + cookie x4 tspns/soft & bite-sized

## 2024-04-10 NOTE — PATIENT PROFILE ADULT - FALL HARM RISK - RISK INTERVENTIONS
Assistance OOB with selected safe patient handling equipment/Assistance with ambulation/Communicate Fall Risk and Risk Factors to all staff, patient, and family/Discuss with provider need for PT consult/Monitor gait and stability/Reinforce activity limits and safety measures with patient and family/Visual Cue: Yellow wristband/Bed in lowest position, wheels locked, appropriate side rails in place/Call bell, personal items and telephone in reach/Instruct patient to call for assistance before getting out of bed or chair/Non-slip footwear when patient is out of bed/[place] to call system/Physically safe environment - no spills, clutter or unnecessary equipment/Purposeful Proactive Rounding/Room/bathroom lighting operational, light cord in reach

## 2024-04-10 NOTE — CONSULT NOTE ADULT - ASSESSMENT
Patient is a 73y old  Female who is from home ambulates with a walker at baseline with PMH of HTN, COPD ( not on O2 at home), current smoker with 60 ppy hx, hypothyroidism, spinal stenosis urinary incontinence, now presents to the ER for evaluation of left-sided lower back and hip pain after fall 2 weeks ago. Approximately 1-2 weeks ago the patient fell while walking and her leg "gave out" and since then she has had left-sided leg pain.  Patient reports she is able to walk with her cane/walker but feels very weak and unbalanced and has since fallen 2 more times. Pt also complains of SOB and chest pain since the fall today after her cane hit her chest.  On admission, she has no fever but found to be hypoxic on RA to 80s saturating well on 1-2 L NC. The CT chest shows Dominant cluster of nodular and patchy opacities within the superior segment of the right lower lobe suggestive of infection with endobronchial spread. Emphysema. Intrahepatic and extrahepatic biliary ductal dilatation is of unclear etiology. CT pelvis shows Acute proximal left femoral fracture,  Age-indeterminate severe compression fracture of the L5 vertebral body. Pt is admitted for L hip fracture/ SOB 2/2 PNA vs COPD exacerbation. She has started on Zosyn and the ID consult requested to assist with further evaluation and antibiotic management.    # Pneumonia  # Left femoral fracture, s/p Fall- Surgery on 4/11 as per Ortho    would recommend :    1. Please obtain MRSA PCR  2. Aspiration precaution  3. Supplemental oxygenation and Bronchodilator as needed  4. Continue Zosyn for now  5. Pain management as neede    will follow the patient with you and make further recommendation based on the clinical course and Lab results  Thank you for the opportunity to participate in Ms. Mayfield care       Attending Attestation:    Spent more than 65 minutes on total encounter, more than 50 % of the visit was spent counseling and/or coordinating care by the Attending physician.

## 2024-04-10 NOTE — CONSULT NOTE ADULT - ASSESSMENT
Progressive weakness over > 5years, to the point of becoming essentially bed-bound.    Progressive weight loss to the point of becoming cachectic.      At least three recent falls, the last one resulting in this hospitalization with an acute L hip Fx.    Severe age-indeterminate L5 vertebral Fx.    Chronic compression deformities of multiple vertebrae.    Profound generalized weakness with no obvious focal, lateralized, or topographic pattern.  She is so profoundly weak, and has so little ability to sustain motor effort, that is is essentially impossible to detect any such pattern or distribution of weakness.    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  Apparently no gross field cut.  ?Paramedian L pontine infarct.  .    Lung nodule; possible malignancy.    Possible remote effects of Ca.    Almost totally absent DTRs.      Bilateral extensor plantar responses, indicating bilateral involvement of the corticospinal pathways.      Severe widespread OA.    Spondylosis throughout the spine.      She may have cervical cord compression, but without spasticity or a demonstrable typical pattern of weakness.       RECOMMENDATIONS       Progressive weakness over > 5years, to the point of becoming essentially bed-bound.    Progressive weight loss to the point of becoming cachectic.      At least three recent falls, the last one resulting in this hospitalization with an acute L hip Fx.    Severe age-indeterminate L5 vertebral Fx.    Chronic compression deformities of multiple vertebrae.    Profound generalized weakness with no obvious focal, lateralized, or topographic pattern.  She is so profoundly weak, and has so little ability to sustain motor effort, that is is essentially impossible to detect any such pattern or distribution of weakness.    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  Apparently no gross field cut.  ?Paramedian L pontine infarct.  .    Lung nodule; possible malignancy.    Possible remote effects of Ca.    Almost totally absent DTRs.      Bilateral extensor plantar responses, indicating bilateral involvement of the corticospinal pathways.      Severe widespread OA.    Spondylosis throughout the spine.      She may have cervical cord compression, but without spasticity or a demonstrable typical pattern of weakness.     Dysarthria.      Possible neuromuscular conduction disorder (MG? LEMS?).    Unspecified contrast dye allergy (iodinated? gadolinium).      Does not tolerate MR well (question of pain/discomfort rather than claustrophobia?).  Would need sedation.      RECOMMENDATIONS    MRs head, c-spine, t-spine.  If possible wo AND w contrast.  Otherwise just non-contrast.      Respiratory evaluation for NIF and FEV1.      Adequate imaging evaluation of her spine, especially the cervical spine, should precede operation (precede getting intubated for respiratory support).      ESR, CRP, RF, CCP    Progressive weakness over > 5years, to the point of becoming essentially bed-bound.    Progressive weight loss to the point of becoming cachectic.      At least three recent falls, the last one resulting in this hospitalization with an acute L hip Fx.    Severe age-indeterminate L5 vertebral Fx.    Chronic compression deformities of multiple vertebrae.    Profound generalized weakness with no obvious focal, lateralized, or topographic pattern.  She is so profoundly weak, and has so little ability to sustain motor effort, that is is essentially impossible to detect any such pattern or distribution of weakness.    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  Apparently no gross field cut.  ?Paramedian L pontine infarct.  .    Lung nodule; possible malignancy.    Possible remote effects of Ca.    Almost totally absent DTRs.      Bilateral extensor plantar responses, indicating bilateral involvement of the corticospinal pathways.      Severe widespread OA.    Spondylosis throughout the spine.      She may have cervical cord compression, but without spasticity or a demonstrable typical pattern of weakness.     Dysarthria.      Possible neuromuscular conduction disorder (MG? LEMS?).    Unspecified contrast dye allergy (iodinated? gadolinium).      Does not tolerate MR well (question of pain/discomfort rather than claustrophobia?).  Would need sedation.      RECOMMENDATIONS    MRs head, c-spine, t-spine.  If possible wo AND w contrast.  Otherwise just non-contrast.      Respiratory evaluation for NIF and FEV1.      Adequate imaging evaluation of her spine, especially the cervical spine, should precede operation (precede getting intubated for respiratory support).      ESR, CRP, RF, CCP, SPEP, acetylcholine receptor antibodies  (all three - blocking, , etc. . .), serum immunofixation, urine immunofixation, B12, SERUm folate (NOT RBC folic acid), methylmalonic acid+homocysteine, TSH, free thyrixine, CK, KJ, copper, zinc.      Nani Hood M.D.   - Department of Neurology  [place] [facility]       Progressive weakness over > 5years, to the point of becoming essentially bed-bound.    Progressive weight loss to the point of becoming cachectic.      At least three recent falls, the last one resulting in this hospitalization with an acute L hip Fx.    Severe age-indeterminate L5 vertebral Fx.    Chronic compression deformities of multiple vertebrae.    Profound generalized weakness with no obvious focal, lateralized, or topographic pattern.  She is so profoundly weak, and has so little ability to sustain motor effort, that is is essentially impossible to detect any such pattern or distribution of weakness.    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  Apparently no gross field cut.  ?Paramedian L pontine infarct.  .    Lung nodule; possible malignancy.    Possible remote effects of Ca.    Almost totally absent DTRs.      Bilateral extensor plantar responses, indicating bilateral involvement of the corticospinal pathways.      Severe widespread OA.    Spondylosis throughout the spine.      She may have cervical cord compression, but without spasticity or a demonstrable typical pattern of weakness.     Dysarthria.      Possible neuromuscular conduction disorder (MG? LEMS?).    Unspecified contrast dye allergy (iodinated? gadolinium).      Does not tolerate MR well (question of pain/discomfort rather than claustrophobia?).  Would need sedation.      RECOMMENDATIONS    MRs head, c-spine, t-spine.  If possible wo AND w contrast.  Otherwise just non-contrast.      Respiratory evaluation for NIF and FEV1.      Adequate imaging evaluation of her spine, especially the cervical spine, should precede operation (precede getting intubated for respiratory support).      ESR, CRP, RF, CCP, SPEP, acetylcholine receptor antibodies  (all three - blocking, , etc. . .), serum immunofixation, urine immunofixation, B12, SERUm folate (NOT RBC folic acid), methylmalonic acid+homocysteine, TSH, free thyroxine, CK, KJ, copper, zinc.      Nani Hood M.D.   - Department of Neurology  [place] [facility] [employer] [facility]       Progressive weakness over > 5years, to the point of becoming essentially bed-bound.    Progressive weight loss to the point of becoming cachectic.      At least three recent falls, the last one resulting in this hospitalization with an acute L hip Fx.    Severe age-indeterminate L5 vertebral Fx.    Chronic compression deformities of multiple vertebrae.    Profound generalized weakness with no obvious focal, lateralized, or topographic pattern.  She is so profoundly weak, and has so little ability to sustain motor effort, that is is essentially impossible to detect any such pattern or distribution of weakness.    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  Apparently no gross field cut.  ?Paramedian L pontine infarct.  .    Lung nodule; possible malignancy.    Possible remote effects of Ca.    Almost totally absent DTRs.      Bilateral extensor plantar responses, indicating bilateral involvement of the corticospinal pathways.      Severe widespread OA.    Spondylosis throughout the spine.      She may have cervical cord compression, but without spasticity or a demonstrable typical pattern of weakness.     Dysarthria.      Possible neuromuscular conduction disorder (MG? LEMS?).    Unspecified contrast dye allergy (iodinated? gadolinium).      Does not tolerate MR well (question of pain/discomfort rather than claustrophobia?).  Would need sedation.      RECOMMENDATIONS    MRs head, c-spine, t-spine.  If possible wo AND w contrast.  Otherwise just non-contrast.      Respiratory evaluation for NIF and FEV1.      Adequate imaging evaluation of her spine, especially the cervical spine, should precede operation (precede getting intubated for respiratory support).      ESR, CRP, RF, CCP, SPEP, acetylcholine receptor antibodies  (all three - blocking, , etc. . .), serum immunofixation, urine immunofixation, B12, SERUM folate (NOT RBC folic acid), methylmalonic acid+homocysteine, TSH, free thyroxine, CK, KJ, copper, zinc.      Nani Hood M.D.   - Department of Neurology  [place] [facility]       Progressive weakness over > 5years, to the point of becoming essentially bed-bound.    Progressive weight loss to the point of becoming cachectic.      At least three recent falls, the last one resulting in this hospitalization with an acute L hip Fx.    Severe age-indeterminate L5 vertebral Fx.    Chronic compression deformities of multiple vertebrae.    Profound generalized weakness with no obvious focal, lateralized, or topographic pattern.  She is so profoundly weak, and has so little ability to sustain motor effort, that is is essentially impossible to detect any such pattern or distribution of weakness.    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  Apparently no gross field cut.  ?Paramedian L pontine infarct.  .    Lung nodule; possible malignancy.    Possible remote effects of Ca.    Almost totally absent DTRs.      Bilateral extensor plantar responses, indicating bilateral involvement of the corticospinal pathways.      Severe widespread OA.    Spondylosis throughout the spine.      She may have cervical cord compression, but without spasticity or a demonstrable typical pattern of weakness.     Dysarthria.      Possible neuromuscular conduction disorder (MG? LEMS?).    Unspecified contrast dye allergy (iodinated? gadolinium).      Does not tolerate MR well (question of pain/discomfort rather than claustrophobia?).  Would need sedation.    Extended discussion with family members; extended chart review.       RECOMMENDATIONS    MRs head, c-spine, t-spine.  If possible wo AND w contrast.  Otherwise just non-contrast.      Respiratory evaluation for NIF and FEV1.      Adequate imaging evaluation of her spine, especially the cervical spine, should precede operation (precede getting intubated for respiratory support).      ESR, CRP, RF, CCP, SPEP, acetylcholine receptor antibodies  (all three - blocking, , etc. . .), serum immunofixation, urine immunofixation, B12, SERUM folate (NOT RBC folic acid), methylmalonic acid+homocysteine, TSH, free thyroxine, CK, KJ, copper, zinc.      Nani Hood M.D.   - Department of Neurology  [place] [facility]       Progressive weakness over > 5years, to the point of becoming essentially bed-bound.    Progressive weight loss to the point of becoming cachectic.      At least three recent falls, the last one resulting in this hospitalization with an acute L hip Fx.    Severe age-indeterminate L5 vertebral Fx.    Chronic compression deformities of multiple vertebrae.    Profound generalized weakness with no obvious focal, lateralized, or topographic pattern.  She is so profoundly weak, and has so little ability to sustain motor effort, that is is essentially impossible to detect any such pattern or distribution of weakness.    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  Apparently no gross field cut.  ?Paramedian L pontine infarct.  .    Lung nodule; possible malignancy.    Possible remote effects of Ca.    Almost totally absent DTRs.      Bilateral extensor plantar responses, indicating bilateral involvement of the corticospinal pathways.      Severe widespread OA.    Spondylosis throughout the spine.      She may have cervical cord compression, but without spasticity or a demonstrable typical pattern of weakness.     Dysarthria.      Possible neuromuscular conduction disorder (MG? LEMS?).    Unspecified contrast dye allergy (iodinated? gadolinium).      Does not tolerate MR well (question of pain/discomfort rather than claustrophobia?).  Would need sedation.    Extended discussion with family members; extended chart review.     Cigarette smoking 60 pack years      RECOMMENDATIONS    MRs head, c-spine, t-spine.  If possible wo AND w contrast.  Otherwise just non-contrast.      Respiratory evaluation for NIF and FEV1.      Adequate imaging evaluation of her spine, especially the cervical spine, should precede operation (precede getting intubated for respiratory support).      ESR, CRP, RF, CCP, SPEP, acetylcholine receptor antibodies  (all three - blocking, , etc. . .), serum immunofixation, urine immunofixation, B12, SERUM folate (NOT RBC folic acid), methylmalonic acid+homocysteine, TSH, free thyroxine, CK, KJ, copper, zinc.      Nani Hood M.D.   - Department of Neurology  [place] [facility] at [facility]

## 2024-04-10 NOTE — SWALLOW BEDSIDE ASSESSMENT ADULT - SWALLOW EVAL: DIAGNOSIS
Pt p/w oropharyngeal dysphagia c/b impaired coordination of labial and lingual cavity, impaired mastication, decreased A-P transport, and reduced hyolaryngeal elevation. No overt s&s of penetration/aspiration was noted on all other trials aside from thin liquids.

## 2024-04-10 NOTE — SWALLOW BEDSIDE ASSESSMENT ADULT - SLP PERTINENT HISTORY OF CURRENT PROBLEM
As per chart review, pt is 73-year-old female from home ambulates with a walker at baseline with PMH of HTN, COPD ( not on O2 at home), current smoker with 60 ppy hx, hypothyroidism, spinal stenosis urinary incontinence, presenting with left-sided lower back and hip pain after fall. Patient reported she is able to walk with her cane/walker but feels very weak and unbalanced and has since fallen 2 more times. Patient denied any headache, dizziness, cough, congestion, fever, chills, N,V,D,Abd pain, numbness or tingling. CTH and cervical spine shows No gross acute intracranial hemorrhage. Emphysema.

## 2024-04-10 NOTE — CONSULT NOTE ADULT - SUBJECTIVE AND OBJECTIVE BOX
Reason for consult:    HPI:  73-year-old female from home ambulates with a walker at baseline with PMH of HTN, COPD ( not on O2 at home), current smoker with 60 ppy hx, hypothyroidism, spinal stenosis urinary incontinence, presenting with left-sided lower back and hip pain after fall 2 weeks ago. Approximately 1-2 weeks ago the patient fell while walking and her leg "gave out" and since then she has had left-sided leg pain.  Patient reports she is able to walk with her cane/walker but feels very weak and unbalanced and has since fallen 2 more times. Pt also complains of SOB. Patient reporting chest pain since the fall today after her cane hit her chest. Patient denies any headache, dizziness, cough, congestion, fever, chills, N,V,D,Abd pain, numbness or tingling. In the ED, pt is comfortable, but complains of pain, NAD, pt is wheezing b/l with R sided crackles, pt was found to be hypoxic on RA to 80s saturating well on 1-2 L NC, hypertensive 175/105, wbc 9, Na 130. CTH and cervical spine shows No gross acute intracranial hemorrhage. No gross acute fracture cervical spine. CTC shows Loss of height of multiple thoracic and lumbar spine vertebral bodies which are of indeterminate age. Dominant cluster of nodular and patchy opacities within the superior segment of the right lower lobe suggestive of infection with endobronchial spread. Emphysema. Intrahepatic and extrahepatic biliary ductal dilatation is of unclear etiology. CT pelvis shows Acute proximal left femoral fracture,  Age-indeterminate severe compression fracture of the L5 vertebral body. Pt is admitted for L hip fracture/ SOB 2/2 PNA vs COPD exacerbation.    Off note: pt states that she lost about 30 lbs in the last 2 months. She is undergoing colonoscopy o/p to evaluate for weight loss. pt denies any blood in the stool. However, she has family history of colon ca in her father.  (09 Apr 2024 19:22)      PAST MEDICAL & SURGICAL HISTORY:  HTN (hypertension)      Hypothyroidism      COPD (chronic obstructive pulmonary disease)      Spinal stenosis      Current smoker      H/O foot surgery          FAMILY HISTORY:  FH: colon cancer (Father)        Alochol: Denied  Smoking: Nonsmoker  Drug Use: Denied  Marital Status:         Allergies    No Known Allergies    Intolerances        MEDICATIONS  (STANDING):  acetaminophen     Tablet .. 1000 milliGRAM(s) Oral every 8 hours  albuterol/ipratropium for Nebulization 3 milliLiter(s) Nebulizer every 6 hours  ALPRAZolam 0.5 milliGRAM(s) Oral daily  budesonide 160 MICROgram(s)/formoterol 4.5 MICROgram(s) Inhaler 2 Puff(s) Inhalation two times a day  chlorhexidine 2% Cloths 1 Application(s) Topical <User Schedule>  enoxaparin Injectable 40 milliGRAM(s) SubCutaneous every 24 hours  gabapentin 100 milliGRAM(s) Oral every 8 hours  levothyroxine 112 MICROGram(s) Oral daily  lidocaine   4% Patch 1 Patch Transdermal daily  losartan 25 milliGRAM(s) Oral daily  naloxone Injectable 0.4 milliGRAM(s) IV Push once  nicotine -   7 mG/24Hr(s) Patch 1 Patch Transdermal daily  piperacillin/tazobactam IVPB.. 3.375 Gram(s) IV Intermittent every 8 hours  polyethylene glycol 3350 17 Gram(s) Oral daily  predniSONE   Tablet 40 milliGRAM(s) Oral daily  senna 2 Tablet(s) Oral at bedtime    MEDICATIONS  (PRN):  albuterol    90 MICROgram(s) HFA Inhaler 2 Puff(s) Inhalation every 6 hours PRN Bronchospasm  benzonatate 100 milliGRAM(s) Oral every 8 hours PRN Cough  bisacodyl 5 milliGRAM(s) Oral daily PRN Constipation  cyclobenzaprine 5 milliGRAM(s) Oral every 8 hours PRN Spasm  oxyCODONE    IR 2.5 milliGRAM(s) Oral every 4 hours PRN Moderate Pain (4 - 6)  oxyCODONE    IR 5 milliGRAM(s) Oral every 4 hours PRN Severe Pain (7 - 10)      ROS  14 point ROS negative except for above.     T(C): 36.7 (04-10-24 @ 08:13), Max: 37.1 (04-09-24 @ 20:38)  HR: 82 (04-10-24 @ 08:13) (81 - 97)  BP: 183/95 (04-10-24 @ 08:13) (152/95 - 183/95)  RR: 18 (04-10-24 @ 08:13) (18 - 25)  SpO2: 94% (04-10-24 @ 08:13) (86% - 94%)  Wt(kg): --    PE  NAD  Awake, alert  Anicteric, MMM  RRR  CTAB  Abd soft, NT, ND  No c/c/e  No rash grossly  FROM                          12.9   8.29  )-----------( 190      ( 10 Apr 2024 05:10 )             38.2       04-10    129<L>  |  90<L>  |  11  ----------------------------<  133<H>  3.5   |  32<H>  |  0.43<L>    Ca    8.6      10 Apr 2024 05:10  Phos  3.4     04-10  Mg     1.7     04-10    TPro  6.9  /  Alb  3.0<L>  /  TBili  0.9  /  DBili  x   /  AST  28  /  ALT  24  /  AlkPhos  91  04-10

## 2024-04-10 NOTE — SWALLOW BEDSIDE ASSESSMENT ADULT - ASR SWALLOW DENTITION
Pt partially edentulous, has dentures for upper row of teeth and dentition on bottom row./upper dentures

## 2024-04-10 NOTE — CONSULT NOTE ADULT - SUBJECTIVE AND OBJECTIVE BOX
Patient is a 73y old  Female who presents with a chief complaint of L hip fracture (10 Apr 2024 11:07)          REVIEW OF SYSTEMS: Total of twelve systems have been reviewed with patient and found to be negative unless mentioned in HPI        PAST MEDICAL & SURGICAL HISTORY:  HTN (hypertension)  Hypothyroidism  COPD (chronic obstructive pulmonary disease)  Spinal stenosis  Current smoker  H/O foot surgery        SOCIAL HISTORY  Alcohol: Does not drink  Tobacco: Does not smoke  Illicit substance use: None      FAMILY HISTORY: Non contributory to the present illness        ALLERGIES: No Known Allergies        1Vital Signs Last 24 Hrs  T(C): 37.3 (10 Apr 2024 14:24), Max: 37.3 (10 Apr 2024 14:24)  T(F): 99.1 (10 Apr 2024 14:24), Max: 99.1 (10 Apr 2024 14:24)  HR: 93 (10 Apr 2024 14:24) (81 - 93)  BP: 158/99 (10 Apr 2024 14:24) (148/72 - 183/95)  BP(mean): --  RR: 18 (10 Apr 2024 14:24) (17 - 20)  SpO2: 98% (10 Apr 2024 14:24) (93% - 98%)    Parameters below as of 10 Apr 2024 14:24  Patient On (Oxygen Delivery Method): nasal cannula        PHYSICAL EXAM:  GENERAL: Not in distress   CHEST/LUNG:  Aire ntry bilaterally  HEART: s1 and s2 present  ABDOMEN:  Nontender and  Nondistended  EXTREMITIES: No pedal  edema  CNS: Awake and Alert      LABS:                        12.9   8.29  )-----------( 190      ( 10 Apr 2024 05:10 )             38.2       04-10    129<L>  |  90<L>  |  11  ----------------------------<  133<H>  3.5   |  32<H>  |  0.43<L>    Ca    8.6      10 Apr 2024 05:10  Phos  3.4     04-10  Mg     1.7     04-10    TPro  6.9  /  Alb  3.0<L>  /  TBili  0.9  /  DBili  x   /  AST  28  /  ALT  24  /  AlkPhos  91  04-10    PT/INR - ( 10 Apr 2024 05:10 )   PT: 10.9 sec;   INR: 0.95 ratio         PTT - ( 10 Apr 2024 05:10 )  PTT:29.3 sec      MEDICATIONS  (STANDING):  acetaminophen     Tablet .. 1000 milliGRAM(s) Oral every 8 hours  albuterol/ipratropium for Nebulization 3 milliLiter(s) Nebulizer every 6 hours  budesonide 160 MICROgram(s)/formoterol 4.5 MICROgram(s) Inhaler 2 Puff(s) Inhalation two times a day  chlorhexidine 2% Cloths 1 Application(s) Topical <User Schedule>  enoxaparin Injectable 40 milliGRAM(s) SubCutaneous every 24 hours  levothyroxine 112 MICROGram(s) Oral daily  lidocaine   4% Patch 1 Patch Transdermal daily  losartan 25 milliGRAM(s) Oral daily  naloxone Injectable 0.4 milliGRAM(s) IV Push once  nicotine -   7 mG/24Hr(s) Patch 1 Patch Transdermal daily  piperacillin/tazobactam IVPB.. 3.375 Gram(s) IV Intermittent every 8 hours  polyethylene glycol 3350 17 Gram(s) Oral daily  predniSONE   Tablet 40 milliGRAM(s) Oral daily  senna 2 Tablet(s) Oral at bedtime    MEDICATIONS  (PRN):  albuterol    90 MICROgram(s) HFA Inhaler 2 Puff(s) Inhalation every 6 hours PRN Bronchospasm  benzonatate 100 milliGRAM(s) Oral every 8 hours PRN Cough  bisacodyl 5 milliGRAM(s) Oral daily PRN Constipation  cyclobenzaprine 5 milliGRAM(s) Oral every 8 hours PRN Spasm  oxyCODONE    IR 5 milliGRAM(s) Oral every 4 hours PRN Severe Pain (7 - 10)          RADIOLOGY & ADDITIONAL TESTS:               Patient is a 73y old  Female who is from home ambulates with a walker at baseline with PMH of HTN, COPD ( not on O2 at home), current smoker with 60 ppy hx, hypothyroidism, spinal stenosis urinary incontinence, now presents to the ER for evaluation of left-sided lower back and hip pain after fall 2 weeks ago. Approximately 1-2 weeks ago the patient fell while walking and her leg "gave out" and since then she has had left-sided leg pain.  Patient reports she is able to walk with her cane/walker but feels very weak and unbalanced and has since fallen 2 more times. Pt also complains of SOB and chest pain since the fall today after her cane hit her chest.  On admission, she has no fever but found to be hypoxic on RA to 80s saturating well on 1-2 L NC. The CT chest shows Dominant cluster of nodular and patchy opacities within the superior segment of the right lower lobe suggestive of infection with endobronchial spread. Emphysema. Intrahepatic and extrahepatic biliary ductal dilatation is of unclear etiology. CT pelvis shows Acute proximal left femoral fracture,  Age-indeterminate severe compression fracture of the L5 vertebral body. Pt is admitted for L hip fracture/ SOB 2/2 PNA vs COPD exacerbation. She has started on Zosyn and the ID consult requested to assist with further evaluation and antibiotic management.      REVIEW OF SYSTEMS: Total of twelve systems have been reviewed with patient and found to be negative unless mentioned in HPI        PAST MEDICAL & SURGICAL HISTORY:  HTN (hypertension)  Hypothyroidism  COPD (chronic obstructive pulmonary disease)  Spinal stenosis  Current smoker  H/O foot surgery      SOCIAL HISTORY  Alcohol: Does not drink  Tobacco:current smoker with 60 ppy hx,  Illicit substance use: None      FAMILY HISTORY: Non contributory to the present illness      ALLERGIES: No Known Allergies      1Vital Signs Last 24 Hrs  T(C): 37.3 (10 Apr 2024 14:24), Max: 37.3 (10 Apr 2024 14:24)  T(F): 99.1 (10 Apr 2024 14:24), Max: 99.1 (10 Apr 2024 14:24)  HR: 93 (10 Apr 2024 14:24) (81 - 93)  BP: 158/99 (10 Apr 2024 14:24) (148/72 - 183/95)  BP(mean): --  RR: 18 (10 Apr 2024 14:24) (17 - 20)  SpO2: 98% (10 Apr 2024 14:24) (93% - 98%)    Parameters below as of 10 Apr 2024 14:24  Patient On (Oxygen Delivery Method): nasal cannula      PHYSICAL EXAM:  GENERAL: Not in distress, on oxygen via NC  CHEST/LUNG: Not using accessory muscles   HEART: s1 and s2 present  ABDOMEN:  Nontender and  Nondistended  EXTREMITIES: No pedal  edema  CNS: Awake and Alert      LABS:                        12.9   8.29  )-----------( 190      ( 10 Apr 2024 05:10 )             38.2       04-10    129<L>  |  90<L>  |  11  ----------------------------<  133<H>  3.5   |  32<H>  |  0.43<L>    Ca    8.6      10 Apr 2024 05:10  Phos  3.4     04-10  Mg     1.7     04-10    TPro  6.9  /  Alb  3.0<L>  /  TBili  0.9  /  DBili  x   /  AST  28  /  ALT  24  /  AlkPhos  91  04-10    PT/INR - ( 10 Apr 2024 05:10 )   PT: 10.9 sec;   INR: 0.95 ratio         PTT - ( 10 Apr 2024 05:10 )  PTT:29.3 sec      MEDICATIONS  (STANDING):  acetaminophen     Tablet .. 1000 milliGRAM(s) Oral every 8 hours  albuterol/ipratropium for Nebulization 3 milliLiter(s) Nebulizer every 6 hours  budesonide 160 MICROgram(s)/formoterol 4.5 MICROgram(s) Inhaler 2 Puff(s) Inhalation two times a day  chlorhexidine 2% Cloths 1 Application(s) Topical <User Schedule>  enoxaparin Injectable 40 milliGRAM(s) SubCutaneous every 24 hours  levothyroxine 112 MICROGram(s) Oral daily  lidocaine   4% Patch 1 Patch Transdermal daily  losartan 25 milliGRAM(s) Oral daily  naloxone Injectable 0.4 milliGRAM(s) IV Push once  nicotine -   7 mG/24Hr(s) Patch 1 Patch Transdermal daily  piperacillin/tazobactam IVPB.. 3.375 Gram(s) IV Intermittent every 8 hours  polyethylene glycol 3350 17 Gram(s) Oral daily  predniSONE   Tablet 40 milliGRAM(s) Oral daily  senna 2 Tablet(s) Oral at bedtime    MEDICATIONS  (PRN):  albuterol    90 MICROgram(s) HFA Inhaler 2 Puff(s) Inhalation every 6 hours PRN Bronchospasm  benzonatate 100 milliGRAM(s) Oral every 8 hours PRN Cough  bisacodyl 5 milliGRAM(s) Oral daily PRN Constipation  cyclobenzaprine 5 milliGRAM(s) Oral every 8 hours PRN Spasm  oxyCODONE    IR 5 milliGRAM(s) Oral every 4 hours PRN Severe Pain (7 - 10)        RADIOLOGY & ADDITIONAL TESTS:      4/9/24 : CT Chest No Cont (04.09.24 @ 17:06) Axial CT images of the chest are obtained without intravenous   administration of contrast.      IMPRESSION: Loss of height of multiple thoracic and lumbar spine vertebral bodies which are of indeterminate age. Clinical correlation for   back pain is recommended. Dedicated spinal MRI can be performed for complete evaluation.    Dominant cluster of nodular and patchy opacities within the superior segment of the right lower lobe suggestive of infection with   endobronchial spread. A 1-3 month follow-up noncontrast chest CT is recommended to ensure improvement/resolution.    Emphysema.    Intrahepatic and extrahepatic biliary ductal dilatation is of unclear etiology. Upper abdominal ultrasound can be performedfor further   evaluation as clinically warranted.      4/10/24: CT Head No Cont (04.10.24 @ 15:55) IMPRESSION: Age-appropriate involutional and ischemic gliotic changes. No   hemorrhage. No change since 4/9/2024.      4/9/24 : Xray Femur 2 Views, Left (04.09.24 @ 20:35) 1. Diffuse bony demineralization associated with a fracture along the   intertrochanteric line of the proximal left femur, with only slight upward migration of the distal segment, but less than one half bone width.  2. Degenerative arthropathy of the left knee.    4/9/24: Xray Pelvis AP only (04.09.24 @ 20:35) 1. Diffuse bony demineralization with a fracture along the   intertrochanteric line of the proximal left femur. No dislocation of the hips.        MICROBIOLOGY DATA:    Respiratory Viral Panel with COVID-19 by SHAHANA (04.09.24 @ 21:24)   Rapid RVP Result: NotDete  SARS-CoV-2: NotDetec:

## 2024-04-10 NOTE — SWALLOW BEDSIDE ASSESSMENT ADULT - SLP PRECAUTIONS/LIMITATIONS: VISION
Continue Regimen: Halobetasol prop ointment on hands discontinue on ankles
Detail Level: Zone
Render In Strict Bullet Format?: No
within functional limits

## 2024-04-10 NOTE — PROGRESS NOTE ADULT - SUBJECTIVE AND OBJECTIVE BOX
Patient is a 73y old  Female who presents with a chief complaint of L hip fracture (10 Apr 2024 11:07)      INTERVAL HPI/OVERNIGHT EVENTS: Called by RN around 1457, that pt daughter and  at bedside. Per daughter, pt has been having slurry speech since yesterday prior to coming to hospital yesterday. Pt slurring  on /off. ? if due to polypharmacy, 2/2 Zanax and oxycodone. Although pt has been on it at home. CT head from yesterday no acute intracranial hemorrhage Will repeat CT head again , and consult Neuro. f/u recs Discussed with attending. discussed with kalpesh, pt family concerned that pt may not be ready for surgery tomorrow.      REVIEW OF SYSTEMS:    CONSTITUTIONAL: No fever, chills  ENMT:  No difficulty hearing, no change in vision  NECK: No pain or stiffness  RESPIRATORY: No cough, SOB  CARDIOVASCULAR: No chest pain, palpitations  GASTROINTESTINAL: No abdominal pain. No nausea, vomiting, or diarrhea  GENITOURINARY: No dysuria  NEUROLOGICAL: No HA  SKIN: No itching, burning, rashes, or lesions   LYMPH NODES: No enlarged glands  ENDOCRINE: No heat or cold intolerance; No hair loss  MUSCULOSKELETAL: c/o r back pain   PSYCHIATRIC: No depression, anxiety  HEME/LYMPH: No easy bruising, or bleeding gums    T(C): 37.3 (04-10-24 @ 14:24), Max: 37.3 (04-10-24 @ 14:24)  HR: 93 (04-10-24 @ 14:24) (81 - 93)  BP: 158/99 (04-10-24 @ 14:24) (148/72 - 183/95)  RR: 18 (04-10-24 @ 14:24) (17 - 20)  SpO2: 98% (04-10-24 @ 14:24) (93% - 98%)  Wt(kg): --Vital Signs Last 24 Hrs  T(C): 37.3 (10 Apr 2024 14:24), Max: 37.3 (10 Apr 2024 14:24)  T(F): 99.1 (10 Apr 2024 14:24), Max: 99.1 (10 Apr 2024 14:24)  HR: 93 (10 Apr 2024 14:24) (81 - 93)  BP: 158/99 (10 Apr 2024 14:24) (148/72 - 183/95)  BP(mean): --  RR: 18 (10 Apr 2024 14:24) (17 - 20)  SpO2: 98% (10 Apr 2024 14:24) (93% - 98%)    Parameters below as of 10 Apr 2024 14:24  Patient On (Oxygen Delivery Method): nasal cannula    MEDICATIONS  (STANDING):  acetaminophen     Tablet .. 1000 milliGRAM(s) Oral every 8 hours  albuterol/ipratropium for Nebulization 3 milliLiter(s) Nebulizer every 6 hours  budesonide 160 MICROgram(s)/formoterol 4.5 MICROgram(s) Inhaler 2 Puff(s) Inhalation two times a day  chlorhexidine 2% Cloths 1 Application(s) Topical <User Schedule>  enoxaparin Injectable 40 milliGRAM(s) SubCutaneous every 24 hours  levothyroxine 112 MICROGram(s) Oral daily  lidocaine   4% Patch 1 Patch Transdermal daily  losartan 25 milliGRAM(s) Oral daily  naloxone Injectable 0.4 milliGRAM(s) IV Push once  nicotine -   7 mG/24Hr(s) Patch 1 Patch Transdermal daily  piperacillin/tazobactam IVPB.. 3.375 Gram(s) IV Intermittent every 8 hours  polyethylene glycol 3350 17 Gram(s) Oral daily  predniSONE   Tablet 40 milliGRAM(s) Oral daily  senna 2 Tablet(s) Oral at bedtime    MEDICATIONS  (PRN):  albuterol    90 MICROgram(s) HFA Inhaler 2 Puff(s) Inhalation every 6 hours PRN Bronchospasm  benzonatate 100 milliGRAM(s) Oral every 8 hours PRN Cough  bisacodyl 5 milliGRAM(s) Oral daily PRN Constipation  cyclobenzaprine 5 milliGRAM(s) Oral every 8 hours PRN Spasm  oxyCODONE    IR 5 milliGRAM(s) Oral every 4 hours PRN Severe Pain (7 - 10)      PHYSICAL EXAM:  GENERAL: NAD.   EYES: clear conjunctiva; EOMI  ENMT: Dry mucous membranes  NECK: Supple, No JVD, Normal thyroid  CHEST/LUNG: Clear to auscultation bilaterally; No rales, rhonchi, wheezing, or rubs  HEART: S1, S2, Regular rate and rhythm  ABDOMEN: Soft, Nontender, Nondistended; Bowel sounds present  NEURO: Alert & awake   EXTREMITIES: No LE edema, no calf tenderness  LYMPH: No lymphadenopathy noted  SKIN: No rashes or lesions    Consultant(s) Notes Reviewed:  [x ] YES  [ ] NO  Care Discussed with Consultants/Other Providers [ x] YES  [ ] NO    LABS:                        12.9   8.29  )-----------( 190      ( 10 Apr 2024 05:10 )             38.2     04-10    129<L>  |  90<L>  |  11  ----------------------------<  133<H>  3.5   |  32<H>  |  0.43<L>    Ca    8.6      10 Apr 2024 05:10  Phos  3.4     04-10  Mg     1.7     04-10    TPro  6.9  /  Alb  3.0<L>  /  TBili  0.9  /  DBili  x   /  AST  28  /  ALT  24  /  AlkPhos  91  04-10    PT/INR - ( 10 Apr 2024 05:10 )   PT: 10.9 sec;   INR: 0.95 ratio         PTT - ( 10 Apr 2024 05:10 )  PTT:29.3 sec  CAPILLARY BLOOD GLUCOSE      Urinalysis Basic - ( 10 Apr 2024 05:10 )    Color: x / Appearance: x / SG: x / pH: x  Gluc: 133 mg/dL / Ketone: x  / Bili: x / Urobili: x   Blood: x / Protein: x / Nitrite: x   Leuk Esterase: x / RBC: x / WBC x   Sq Epi: x / Non Sq Epi: x / Bacteria: x        RADIOLOGY & ADDITIONAL TESTS:    Imaging Personally Reviewed:  [ x] YES  [ ] NO  < from: Xray Femur 2 Views, Left (04.09.24 @ 20:35) >    ACC: [id number] EXAM:  XR FEMUR 2 VIEWS LT   ORDERED BY: SAMEER KLEIN     PROCEDURE DATE:  04/09/2024          INTERPRETATION:  Frontal and lateral views of the left femur were   obtained for history of fracture.    Comparison is made to a CT scan of the pelvis on the same day.    The bones are diffusely demineralized. There is a fracture along the   intertrochanteric line of the proximal left femur. The distal segment is   slightly upwardly displaced by less than one half bone width. There is no   dislocation of the left hip which in itself is grossly intact. There is   chronic degenerative narrowing of the joint spaces of the left knee. No   other fractures are seen.    IMPRESSION:  1. Diffuse bony demineralization associated with a fracture along the   intertrochanteric line of the proximal left femur, with only slight   upward migration of the distal segment, but less than one half bone width.  2. Degenerative arthropathy of the left knee.    --- End of Report ---          < end of copied text >  < from: Xray Pelvis AP only (04.09.24 @ 20:35) >    ACC: [id number] EXAM:  XR PELVIS AP ONLY 1-2 VIEWS   ORDERED BY: SAMEER KLEIN     PROCEDURE DATE:  04/09/2024          INTERPRETATION:  An AP radiograph of the pelvis was obtained for fracture.    Comparison is made to a CT scan of the pelvis earlier same day.    The bones appear slightly demineralized diffusely and is associated with   a fracture along the intertrochanteric line of the proximal left femur,   with slight upward and lateral migration of the distal segment. There is   no dislocation of the left femur head relative to the acetabulum. The   hips are otherwise unremarkable. There is no AVN. Bowel gas and stool   obscures visualization of the sacrum and SI joints however there is no   diastases seen. The symphysis pubis is grossly intact. Both pubic rings   are preserved.    IMPRESSION:  1. Diffuse bony demineralization with a fracture along the   intertrochanteric line of the proximal left femur, as detailed above. No   dislocation of the hips.    --- End of Report ---            JORDI NGUYEN MD; Attending Radiologist  This document has been electronically signed. Apr 10 2024  8:14AM    < end of copied text >

## 2024-04-10 NOTE — PROGRESS NOTE ADULT - PROBLEM SELECTOR PLAN 4
pt states that she lost about 30 lbs in the last 2 months  She is undergoing colonoscopy o/p to evaluate for weight loss  pt denies any blood in the stool  family history of colon ca in her father  f/u CA markers  pt to continue following o/p with colonoscopy  f/u CT C/A/P w IV con r/o malignancy  f/u Heme/onc Dr VILLALOBOS   per daughter pt had MRI OP lung nodules was seen

## 2024-04-10 NOTE — SWALLOW BEDSIDE ASSESSMENT ADULT - PHARYNGEAL PHASE
delayed swallow trigger/Decreased laryngeal elevation delayed swallow trigger/Decreased laryngeal elevation/Cough post oral intake

## 2024-04-10 NOTE — SWALLOW BEDSIDE ASSESSMENT ADULT - ORAL PREPARATORY PHASE
impaired coordination of labial and lingual cavity impaired coordination of labial and lingual cavity/Decreased mastication ability

## 2024-04-10 NOTE — PROGRESS NOTE ADULT - SUBJECTIVE AND OBJECTIVE BOX
Patient is a 73y old  Female who presents with a chief complaint of L hip fracture (10 Apr 2024       INTERVAL HPI/OVERNIGHT EVENTS: Called by RN around 1457, that pt daughter and  at bedside. Per daughter, pt has been having slurry speech since yesterday prior to coming to hospital yesterday. Pt slurring  on /off. ? if due to polypharmacy, 2/2 Zanax and oxycodone. Although pt has been on it at home. CT head from yesterday no acute intracranial hemorrhage Will repeat CT head again , and consult Neuro. f/u recs Discussed with attending. discussed with kalpesh, pt family concerned that pt may not be ready for surgery tomorrow.      REVIEW OF SYSTEMS:    CONSTITUTIONAL: No fever, chills  ENMT:  No difficulty hearing, no change in vision  NECK: No pain or stiffness  RESPIRATORY: No cough, SOB  CARDIOVASCULAR: No chest pain, palpitations  GASTROINTESTINAL: No abdominal pain. No nausea, vomiting, or diarrhea  GENITOURINARY: No dysuria  NEUROLOGICAL: No HA  SKIN: No itching, burning, rashes, or lesions   LYMPH NODES: No enlarged glands  ENDOCRINE: No heat or cold intolerance; No hair loss  MUSCULOSKELETAL: c/o r back pain   PSYCHIATRIC: No depression, anxiety  HEME/LYMPH: No easy bruising, or bleeding gums    T(C): 37.3 (04-10-24 @ 14:24), Max: 37.3 (04-10-24 @ 14:24)  HR: 93 (04-10-24 @ 14:24) (81 - 93)  BP: 158/99 (04-10-24 @ 14:24) (148/72 - 183/95)  RR: 18 (04-10-24 @ 14:24) (17 - 20)  SpO2: 98% (04-10-24 @ 14:24) (93% - 98%)  Wt(kg): --Vital Signs Last 24 Hrs  T(C): 37.3 (10 Apr 2024 14:24), Max: 37.3 (10 Apr 2024 14:24)  T(F): 99.1 (10 Apr 2024 14:24), Max: 99.1 (10 Apr 2024 14:24)  HR: 93 (10 Apr 2024 14:24) (81 - 93)  BP: 158/99 (10 Apr 2024 14:24) (148/72 - 183/95)  BP(mean): --  RR: 18 (10 Apr 2024 14:24) (17 - 20)  SpO2: 98% (10 Apr 2024 14:24) (93% - 98%)    Parameters below as of 10 Apr 2024 14:24  Patient On (Oxygen Delivery Method): nasal cannula    MEDICATIONS  (STANDING):  acetaminophen     Tablet .. 1000 milliGRAM(s) Oral every 8 hours  albuterol/ipratropium for Nebulization 3 milliLiter(s) Nebulizer every 6 hours  budesonide 160 MICROgram(s)/formoterol 4.5 MICROgram(s) Inhaler 2 Puff(s) Inhalation two times a day  chlorhexidine 2% Cloths 1 Application(s) Topical <User Schedule>  enoxaparin Injectable 40 milliGRAM(s) SubCutaneous every 24 hours  levothyroxine 112 MICROGram(s) Oral daily  lidocaine   4% Patch 1 Patch Transdermal daily  losartan 25 milliGRAM(s) Oral daily  naloxone Injectable 0.4 milliGRAM(s) IV Push once  nicotine -   7 mG/24Hr(s) Patch 1 Patch Transdermal daily  piperacillin/tazobactam IVPB.. 3.375 Gram(s) IV Intermittent every 8 hours  polyethylene glycol 3350 17 Gram(s) Oral daily  predniSONE   Tablet 40 milliGRAM(s) Oral daily  senna 2 Tablet(s) Oral at bedtime    MEDICATIONS  (PRN):  albuterol    90 MICROgram(s) HFA Inhaler 2 Puff(s) Inhalation every 6 hours PRN Bronchospasm  benzonatate 100 milliGRAM(s) Oral every 8 hours PRN Cough  bisacodyl 5 milliGRAM(s) Oral daily PRN Constipation  cyclobenzaprine 5 milliGRAM(s) Oral every 8 hours PRN Spasm  oxyCODONE    IR 5 milliGRAM(s) Oral every 4 hours PRN Severe Pain (7 - 10)      PHYSICAL EXAM:  GENERAL: NAD.   EYES: clear conjunctiva; EOMI  ENMT: Dry mucous membranes  NECK: Supple, No JVD, Normal thyroid  CHEST/LUNG: dec breath sounds at bases  HEART: S1, S2, +  ABDOMEN: Soft, Nontender, Nondistended; Bowel sounds present  NEURO: Alert & awake   EXTREMITIES: No LE edema, no calf tenderness  LYMPH: No lymphadenopathy noted  SKIN: No rashes or lesions    Consultant(s) Notes Reviewed:  [x ] YES  [ ] NO  Care Discussed with Consultants/Other Providers [ x] YES  [ ] NO    LABS:                        12.9   8.29  )-----------( 190      ( 10 Apr 2024 05:10 )             38.2     04-10    129<L>  |  90<L>  |  11  ----------------------------<  133<H>  3.5   |  32<H>  |  0.43<L>    Ca    8.6      10 Apr 2024 05:10  Phos  3.4     04-10  Mg     1.7     04-10    TPro  6.9  /  Alb  3.0<L>  /  TBili  0.9  /  DBili  x   /  AST  28  /  ALT  24  /  AlkPhos  91  04-10    PT/INR - ( 10 Apr 2024 05:10 )   PT: 10.9 sec;   INR: 0.95 ratio         PTT - ( 10 Apr 2024 05:10 )  PTT:29.3 sec  CAPILLARY BLOOD GLUCOSE      Urinalysis Basic - ( 10 Apr 2024 05:10 )    Color: x / Appearance: x / SG: x / pH: x  Gluc: 133 mg/dL / Ketone: x  / Bili: x / Urobili: x   Blood: x / Protein: x / Nitrite: x   Leuk Esterase: x / RBC: x / WBC x   Sq Epi: x / Non Sq Epi: x / Bacteria: x        RADIOLOGY & ADDITIONAL TESTS:    Imaging Personally Reviewed:  [ x] YES  [ ] NO  < from: Xray Femur 2 Views, Left (04.09.24 @ 20:35) >    ACC: [id number] EXAM:  XR FEMUR 2 VIEWS LT   ORDERED BY: SAMEER KLEIN     PROCEDURE DATE:  04/09/2024          INTERPRETATION:  Frontal and lateral views of the left femur were   obtained for history of fracture.    Comparison is made to a CT scan of the pelvis on the same day.    The bones are diffusely demineralized. There is a fracture along the   intertrochanteric line of the proximal left femur. The distal segment is   slightly upwardly displaced by less than one half bone width. There is no   dislocation of the left hip which in itself is grossly intact. There is   chronic degenerative narrowing of the joint spaces of the left knee. No   other fractures are seen.    IMPRESSION:  1. Diffuse bony demineralization associated with a fracture along the   intertrochanteric line of the proximal left femur, with only slight   upward migration of the distal segment, but less than one half bone width.  2. Degenerative arthropathy of the left knee.    --- End of Report ---          < end of copied text >  < from: Xray Pelvis AP only (04.09.24 @ 20:35) >    ACC: [id number] EXAM:  XR PELVIS AP ONLY 1-2 VIEWS   ORDERED BY: SAMEER KLEIN     PROCEDURE DATE:  04/09/2024          INTERPRETATION:  An AP radiograph of the pelvis was obtained for fracture.    Comparison is made to a CT scan of the pelvis earlier same day.    The bones appear slightly demineralized diffusely and is associated with   a fracture along the intertrochanteric line of the proximal left femur,   with slight upward and lateral migration of the distal segment. There is   no dislocation of the left femur head relative to the acetabulum. The   hips are otherwise unremarkable. There is no AVN. Bowel gas and stool   obscures visualization of the sacrum and SI joints however there is no   diastases seen. The symphysis pubis is grossly intact. Both pubic rings   are preserved.    IMPRESSION:  1. Diffuse bony demineralization with a fracture along the   intertrochanteric line of the proximal left femur, as detailed above. No   dislocation of the hips.    --- End of Report ---            JORDI NGUYEN MD; Attending Radiologist  This document has been electronically signed. Apr 10 2024  8:14AM    < end of copied text >

## 2024-04-10 NOTE — PROGRESS NOTE ADULT - ASSESSMENT
73-year-old female from home ambulates with a walker at baseline with PMH of HTN, COPD ( not on O2 at home), current smoker with 60 ppy hx, hypothyroidism, spinal stenosis urinary incontinence, presenting with left-sided lower back and hip pain after fall 2 weeks ago.  CTH and cervical spine shows No gross acute intracranial hemorrhage. CT head no acute intracranial hemorrhage.  CT pelvis shows Acute proximal left femoral fracture. Pt is admitted for L hip fracture/ SOB 2/2 PNA vs COPD exacerbation.  Orthro following, plan for surgery IM nailing.  F/u  neuro recs for ongoing slurring of speech.

## 2024-04-10 NOTE — SWALLOW BEDSIDE ASSESSMENT ADULT - COMMENTS
Pt AA+Ox3. Pt cachexic. Daughter and  present.  expressed concern about potential CVA, stated that pt was admitted d/t observed L facial drooping and slurred speech. Pt was awake and alert and did not p/w slurred speech nor L facial drooping at baseline. Baseline productive cough; oral hygiene and suction was performed via Toothette and Yankauer suction.

## 2024-04-10 NOTE — PROGRESS NOTE ADULT - PROBLEM SELECTOR PLAN 1
s/p fall x3 since easter   CT pelvis shows Acute proximal left femoral fracture  NPO after mn for surgery in am IM nailing   ortho following

## 2024-04-11 DIAGNOSIS — J44.0 CHRONIC OBSTRUCTIVE PULMONARY DISEASE WITH (ACUTE) LOWER RESPIRATORY INFECTION: ICD-10-CM

## 2024-04-11 DIAGNOSIS — R47.81 SLURRED SPEECH: ICD-10-CM

## 2024-04-11 DIAGNOSIS — E87.1 HYPO-OSMOLALITY AND HYPONATREMIA: ICD-10-CM

## 2024-04-11 LAB
ANION GAP SERPL CALC-SCNC: 2 MMOL/L — LOW (ref 5–17)
ANION GAP SERPL CALC-SCNC: 6 MMOL/L — SIGNIFICANT CHANGE UP (ref 5–17)
APPEARANCE UR: CLEAR — SIGNIFICANT CHANGE UP
BACTERIA # UR AUTO: NEGATIVE /HPF — SIGNIFICANT CHANGE UP
BILIRUB UR-MCNC: NEGATIVE — SIGNIFICANT CHANGE UP
BUN SERPL-MCNC: 11 MG/DL — SIGNIFICANT CHANGE UP (ref 7–18)
BUN SERPL-MCNC: 9 MG/DL — SIGNIFICANT CHANGE UP (ref 7–18)
CALCIUM SERPL-MCNC: 8.4 MG/DL — SIGNIFICANT CHANGE UP (ref 8.4–10.5)
CALCIUM SERPL-MCNC: 8.5 MG/DL — SIGNIFICANT CHANGE UP (ref 8.4–10.5)
CHLORIDE SERPL-SCNC: 90 MMOL/L — LOW (ref 96–108)
CHLORIDE SERPL-SCNC: 96 MMOL/L — SIGNIFICANT CHANGE UP (ref 96–108)
CK SERPL-CCNC: 157 U/L — SIGNIFICANT CHANGE UP (ref 21–215)
CO2 SERPL-SCNC: 31 MMOL/L — SIGNIFICANT CHANGE UP (ref 22–31)
CO2 SERPL-SCNC: 32 MMOL/L — HIGH (ref 22–31)
COLOR SPEC: YELLOW — SIGNIFICANT CHANGE UP
CREAT ?TM UR-MCNC: 53 MG/DL — SIGNIFICANT CHANGE UP
CREAT SERPL-MCNC: 0.4 MG/DL — LOW (ref 0.5–1.3)
CREAT SERPL-MCNC: 0.46 MG/DL — LOW (ref 0.5–1.3)
CRP SERPL-MCNC: 64 MG/L — HIGH
DIFF PNL FLD: ABNORMAL
EGFR: 100 ML/MIN/1.73M2 — SIGNIFICANT CHANGE UP
EGFR: 104 ML/MIN/1.73M2 — SIGNIFICANT CHANGE UP
EPI CELLS # UR: SIGNIFICANT CHANGE UP
ERYTHROCYTE [SEDIMENTATION RATE] IN BLOOD: 9 MM/HR — SIGNIFICANT CHANGE UP (ref 0–20)
FOLATE SERPL-MCNC: 9.4 NG/ML — SIGNIFICANT CHANGE UP
GLUCOSE SERPL-MCNC: 127 MG/DL — HIGH (ref 70–99)
GLUCOSE SERPL-MCNC: 143 MG/DL — HIGH (ref 70–99)
GLUCOSE UR QL: NEGATIVE MG/DL — SIGNIFICANT CHANGE UP
HCT VFR BLD CALC: 35 % — SIGNIFICANT CHANGE UP (ref 34.5–45)
HCV RNA FLD QL NAA+PROBE: SIGNIFICANT CHANGE UP
HCV RNA SPEC QL PROBE+SIG AMP: SIGNIFICANT CHANGE UP
HGB BLD-MCNC: 11.9 G/DL — SIGNIFICANT CHANGE UP (ref 11.5–15.5)
KETONES UR-MCNC: NEGATIVE MG/DL — SIGNIFICANT CHANGE UP
LEUKOCYTE ESTERASE UR-ACNC: NEGATIVE — SIGNIFICANT CHANGE UP
MCHC RBC-ENTMCNC: 31.2 PG — SIGNIFICANT CHANGE UP (ref 27–34)
MCHC RBC-ENTMCNC: 34 GM/DL — SIGNIFICANT CHANGE UP (ref 32–36)
MCV RBC AUTO: 91.9 FL — SIGNIFICANT CHANGE UP (ref 80–100)
MRSA PCR RESULT.: SIGNIFICANT CHANGE UP
NITRITE UR-MCNC: NEGATIVE — SIGNIFICANT CHANGE UP
NRBC # BLD: 0 /100 WBCS — SIGNIFICANT CHANGE UP (ref 0–0)
OSMOLALITY UR: 161 MOS/KG — SIGNIFICANT CHANGE UP (ref 50–1200)
PH UR: 7 — SIGNIFICANT CHANGE UP (ref 5–8)
PLATELET # BLD AUTO: 187 K/UL — SIGNIFICANT CHANGE UP (ref 150–400)
POTASSIUM SERPL-MCNC: 2.9 MMOL/L — CRITICAL LOW (ref 3.5–5.3)
POTASSIUM SERPL-MCNC: 3.5 MMOL/L — SIGNIFICANT CHANGE UP (ref 3.5–5.3)
POTASSIUM SERPL-SCNC: 2.9 MMOL/L — CRITICAL LOW (ref 3.5–5.3)
POTASSIUM SERPL-SCNC: 3.5 MMOL/L — SIGNIFICANT CHANGE UP (ref 3.5–5.3)
POTASSIUM UR-SCNC: 39 MMOL/L — SIGNIFICANT CHANGE UP
PROT ?TM UR-MCNC: 33 MG/DL — HIGH (ref 0–12)
PROT SERPL-MCNC: 6 G/DL — SIGNIFICANT CHANGE UP (ref 6–8.3)
PROT UR-MCNC: NEGATIVE MG/DL — SIGNIFICANT CHANGE UP
PROT/CREAT UR-RTO: 0.6 RATIO — HIGH (ref 0–0.2)
RBC # BLD: 3.81 M/UL — SIGNIFICANT CHANGE UP (ref 3.8–5.2)
RBC # FLD: 12.9 % — SIGNIFICANT CHANGE UP (ref 10.3–14.5)
RBC CASTS # UR COMP ASSIST: SIGNIFICANT CHANGE UP /HPF
RHEUMATOID FACT SERPL-ACNC: 10 IU/ML — SIGNIFICANT CHANGE UP (ref 0–13)
S AUREUS DNA NOSE QL NAA+PROBE: SIGNIFICANT CHANGE UP
SODIUM SERPL-SCNC: 127 MMOL/L — LOW (ref 135–145)
SODIUM SERPL-SCNC: 130 MMOL/L — LOW (ref 135–145)
SODIUM UR-SCNC: 28 MMOL/L — SIGNIFICANT CHANGE UP
SODIUM UR-SCNC: 42 MMOL/L — SIGNIFICANT CHANGE UP
SP GR SPEC: 1 — SIGNIFICANT CHANGE UP (ref 1–1.03)
T4 FREE SERPL-MCNC: 1.5 NG/DL — SIGNIFICANT CHANGE UP (ref 0.9–1.8)
TSH SERPL-MCNC: 5.92 UU/ML — HIGH (ref 0.34–4.82)
UROBILINOGEN FLD QL: 0.2 MG/DL — SIGNIFICANT CHANGE UP (ref 0.2–1)
VIT B12 SERPL-MCNC: 1753 PG/ML — HIGH (ref 232–1245)
WBC # BLD: 8.43 K/UL — SIGNIFICANT CHANGE UP (ref 3.8–10.5)
WBC # FLD AUTO: 8.43 K/UL — SIGNIFICANT CHANGE UP (ref 3.8–10.5)
WBC UR QL: 2 /HPF — SIGNIFICANT CHANGE UP (ref 0–5)

## 2024-04-11 PROCEDURE — 72146 MRI CHEST SPINE W/O DYE: CPT | Mod: 26

## 2024-04-11 PROCEDURE — 70551 MRI BRAIN STEM W/O DYE: CPT | Mod: 26

## 2024-04-11 PROCEDURE — 74176 CT ABD & PELVIS W/O CONTRAST: CPT | Mod: 26

## 2024-04-11 PROCEDURE — 99222 1ST HOSP IP/OBS MODERATE 55: CPT

## 2024-04-11 PROCEDURE — 72141 MRI NECK SPINE W/O DYE: CPT | Mod: 26

## 2024-04-11 RX ORDER — OXYCODONE HYDROCHLORIDE 5 MG/1
5 TABLET ORAL EVERY 4 HOURS
Refills: 0 | Status: DISCONTINUED | OUTPATIENT
Start: 2024-04-11 | End: 2024-04-16

## 2024-04-11 RX ORDER — ALPRAZOLAM 0.25 MG
1 TABLET ORAL EVERY 12 HOURS
Refills: 0 | Status: DISCONTINUED | OUTPATIENT
Start: 2024-04-11 | End: 2024-04-16

## 2024-04-11 RX ORDER — LIDOCAINE 4 G/100G
2 CREAM TOPICAL DAILY
Refills: 0 | Status: DISCONTINUED | OUTPATIENT
Start: 2024-04-11 | End: 2024-04-16

## 2024-04-11 RX ORDER — POTASSIUM CHLORIDE 20 MEQ
10 PACKET (EA) ORAL
Refills: 0 | Status: COMPLETED | OUTPATIENT
Start: 2024-04-11 | End: 2024-04-11

## 2024-04-11 RX ORDER — SODIUM CHLORIDE 9 MG/ML
1000 INJECTION INTRAMUSCULAR; INTRAVENOUS; SUBCUTANEOUS
Refills: 0 | Status: DISCONTINUED | OUTPATIENT
Start: 2024-04-11 | End: 2024-04-11

## 2024-04-11 RX ORDER — HEPARIN SODIUM 5000 [USP'U]/ML
5000 INJECTION INTRAVENOUS; SUBCUTANEOUS EVERY 8 HOURS
Refills: 0 | Status: DISCONTINUED | OUTPATIENT
Start: 2024-04-11 | End: 2024-04-16

## 2024-04-11 RX ORDER — POTASSIUM CHLORIDE 20 MEQ
40 PACKET (EA) ORAL ONCE
Refills: 0 | Status: COMPLETED | OUTPATIENT
Start: 2024-04-11 | End: 2024-04-11

## 2024-04-11 RX ORDER — HYDROMORPHONE HYDROCHLORIDE 2 MG/ML
0.5 INJECTION INTRAMUSCULAR; INTRAVENOUS; SUBCUTANEOUS ONCE
Refills: 0 | Status: DISCONTINUED | OUTPATIENT
Start: 2024-04-11 | End: 2024-04-11

## 2024-04-11 RX ORDER — OXYCODONE HYDROCHLORIDE 5 MG/1
10 TABLET ORAL EVERY 4 HOURS
Refills: 0 | Status: DISCONTINUED | OUTPATIENT
Start: 2024-04-11 | End: 2024-04-16

## 2024-04-11 RX ORDER — ALPRAZOLAM 0.25 MG
1 TABLET ORAL ONCE
Refills: 0 | Status: DISCONTINUED | OUTPATIENT
Start: 2024-04-11 | End: 2024-04-11

## 2024-04-11 RX ADMIN — GABAPENTIN 100 MILLIGRAM(S): 400 CAPSULE ORAL at 14:36

## 2024-04-11 RX ADMIN — LIDOCAINE 2 PATCH: 4 CREAM TOPICAL at 23:50

## 2024-04-11 RX ADMIN — Medication 100 MILLIEQUIVALENT(S): at 11:05

## 2024-04-11 RX ADMIN — Medication 1000 MILLIGRAM(S): at 21:30

## 2024-04-11 RX ADMIN — CHLORHEXIDINE GLUCONATE 1 APPLICATION(S): 213 SOLUTION TOPICAL at 06:02

## 2024-04-11 RX ADMIN — Medication 3 MILLILITER(S): at 14:15

## 2024-04-11 RX ADMIN — Medication 1000 MILLIGRAM(S): at 22:00

## 2024-04-11 RX ADMIN — Medication 10 MILLIGRAM(S): at 17:59

## 2024-04-11 RX ADMIN — OXYCODONE HYDROCHLORIDE 5 MILLIGRAM(S): 5 TABLET ORAL at 03:03

## 2024-04-11 RX ADMIN — LIDOCAINE 1 PATCH: 4 CREAM TOPICAL at 00:00

## 2024-04-11 RX ADMIN — Medication 100 MILLIEQUIVALENT(S): at 09:37

## 2024-04-11 RX ADMIN — Medication 40 MILLIGRAM(S): at 05:49

## 2024-04-11 RX ADMIN — HEPARIN SODIUM 5000 UNIT(S): 5000 INJECTION INTRAVENOUS; SUBCUTANEOUS at 14:35

## 2024-04-11 RX ADMIN — Medication 1 PATCH: at 19:47

## 2024-04-11 RX ADMIN — LIDOCAINE 2 PATCH: 4 CREAM TOPICAL at 11:36

## 2024-04-11 RX ADMIN — Medication 3 MILLILITER(S): at 20:05

## 2024-04-11 RX ADMIN — OXYCODONE HYDROCHLORIDE 5 MILLIGRAM(S): 5 TABLET ORAL at 02:08

## 2024-04-11 RX ADMIN — PIPERACILLIN AND TAZOBACTAM 25 GRAM(S): 4; .5 INJECTION, POWDER, LYOPHILIZED, FOR SOLUTION INTRAVENOUS at 14:35

## 2024-04-11 RX ADMIN — Medication 1 MILLIGRAM(S): at 12:23

## 2024-04-11 RX ADMIN — Medication 112 MICROGRAM(S): at 05:49

## 2024-04-11 RX ADMIN — POLYETHYLENE GLYCOL 3350 17 GRAM(S): 17 POWDER, FOR SOLUTION ORAL at 11:34

## 2024-04-11 RX ADMIN — HEPARIN SODIUM 5000 UNIT(S): 5000 INJECTION INTRAVENOUS; SUBCUTANEOUS at 21:30

## 2024-04-11 RX ADMIN — PIPERACILLIN AND TAZOBACTAM 25 GRAM(S): 4; .5 INJECTION, POWDER, LYOPHILIZED, FOR SOLUTION INTRAVENOUS at 21:30

## 2024-04-11 RX ADMIN — LIDOCAINE 1 PATCH: 4 CREAM TOPICAL at 23:50

## 2024-04-11 RX ADMIN — GABAPENTIN 100 MILLIGRAM(S): 400 CAPSULE ORAL at 05:49

## 2024-04-11 RX ADMIN — Medication 40 MILLIEQUIVALENT(S): at 09:36

## 2024-04-11 RX ADMIN — LIDOCAINE 1 PATCH: 4 CREAM TOPICAL at 19:47

## 2024-04-11 RX ADMIN — Medication 1000 MILLIGRAM(S): at 05:49

## 2024-04-11 RX ADMIN — OXYCODONE HYDROCHLORIDE 10 MILLIGRAM(S): 5 TABLET ORAL at 21:30

## 2024-04-11 RX ADMIN — Medication 3 MILLILITER(S): at 09:09

## 2024-04-11 RX ADMIN — LIDOCAINE 2 PATCH: 4 CREAM TOPICAL at 19:47

## 2024-04-11 RX ADMIN — Medication 1000 MILLIGRAM(S): at 15:06

## 2024-04-11 RX ADMIN — Medication 1000 MILLIGRAM(S): at 14:36

## 2024-04-11 RX ADMIN — LIDOCAINE 1 PATCH: 4 CREAM TOPICAL at 11:36

## 2024-04-11 RX ADMIN — Medication 100 MILLIEQUIVALENT(S): at 12:23

## 2024-04-11 RX ADMIN — OXYCODONE HYDROCHLORIDE 5 MILLIGRAM(S): 5 TABLET ORAL at 05:49

## 2024-04-11 RX ADMIN — SENNA PLUS 2 TABLET(S): 8.6 TABLET ORAL at 21:30

## 2024-04-11 RX ADMIN — Medication 3 MILLILITER(S): at 03:08

## 2024-04-11 RX ADMIN — OXYCODONE HYDROCHLORIDE 10 MILLIGRAM(S): 5 TABLET ORAL at 11:04

## 2024-04-11 RX ADMIN — LOSARTAN POTASSIUM 25 MILLIGRAM(S): 100 TABLET, FILM COATED ORAL at 05:49

## 2024-04-11 RX ADMIN — OXYCODONE HYDROCHLORIDE 10 MILLIGRAM(S): 5 TABLET ORAL at 22:00

## 2024-04-11 RX ADMIN — Medication 1 PATCH: at 11:36

## 2024-04-11 RX ADMIN — HYDROMORPHONE HYDROCHLORIDE 0.5 MILLIGRAM(S): 2 INJECTION INTRAMUSCULAR; INTRAVENOUS; SUBCUTANEOUS at 15:57

## 2024-04-11 RX ADMIN — PIPERACILLIN AND TAZOBACTAM 25 GRAM(S): 4; .5 INJECTION, POWDER, LYOPHILIZED, FOR SOLUTION INTRAVENOUS at 06:00

## 2024-04-11 NOTE — PROVIDER CONTACT NOTE (CRITICAL VALUE NOTIFICATION) - PERSON GIVING RESULT:
Patient Education   Leuprolide Acetate Solution for injection  What is this medicine?  LEUPROLIDE (loo PROE lide) is a man-made hormone. It is used to treat the symptoms of prostate cancer. This medicine may also be used to treat children with early onset of puberty. It may be used for other hormonal conditions.  This medicine may be used for other purposes; ask your health care provider or pharmacist if you have questions.  What should I tell my health care provider before I take this medicine?  They need to know if you have any of these conditions:  · diabetes  · heart disease or previous heart attack  · high blood pressure  · high cholesterol  · pain or difficulty passing urine  · spinal cord metastasis  · stroke  · tobacco smoker  · an unusual or allergic reaction to leuprolide, benzyl alcohol, other medicines, foods, dyes, or preservatives  · pregnant or trying to get pregnant  · breast-feeding  How should I use this medicine?  This medicine is for injection under the skin or into a muscle. You will be taught how to prepare and give this medicine. Use exactly as directed. Take your medicine at regular intervals. Do not take your medicine more often than directed.  It is important that you put your used needles and syringes in a special sharps container. Do not put them in a trash can. If you do not have a sharps container, call your pharmacist or healthcare provider to get one.  Talk to your pediatrician regarding the use of this medicine in children. While this medicine may be prescribed for children as young as 8 years for selected conditions, precautions do apply.  Overdosage: If you think you have taken too much of this medicine contact a poison control center or emergency room at once.  NOTE: This medicine is only for you. Do not share this medicine with others.  What if I miss a dose?  If you miss a dose, take it as soon as you can. If it is almost time for your next dose, take only that dose. Do not 
take double or extra doses.  What may interact with this medicine?  Do not take this medicine with any of the following medications:  · chasteberry  This medicine may also interact with the following medications:  · herbal or dietary supplements, like black cohosh or DHEA  · female hormones, like estrogens or progestins and birth control pills, patches, rings, or injections  · male hormones, like testosterone  This list may not describe all possible interactions. Give your health care provider a list of all the medicines, herbs, non-prescription drugs, or dietary supplements you use. Also tell them if you smoke, drink alcohol, or use illegal drugs. Some items may interact with your medicine.  What should I watch for while using this medicine?  Visit your doctor or health care professional for regular checks on your progress. During the first week, your symptoms may get worse, but then will improve as you continue your treatment. You may get hot flashes, increased bone pain, increased difficulty passing urine, or an aggravation of nerve symptoms. Discuss these effects with your doctor or health care professional, some of them may improve with continued use of this medicine.  Female patients may experience a menstrual cycle or spotting during the first 2 months of therapy with this medicine. If this continues, contact your doctor or health care professional.  What side effects may I notice from receiving this medicine?  Side effects that you should report to your doctor or health care professional as soon as possible:  · allergic reactions like skin rash, itching or hives, swelling of the face, lips, or tongue  · breathing problems  · chest pain  · depression or memory disorders  · pain in your legs or groin  · pain at site where injected  · severe headache  · swelling of the feet and legs  · visual changes  · vomiting  Side effects that usually do not require medical attention (report to your doctor or health care 
professional if they continue or are bothersome):  · breast swelling or tenderness  · decrease in sex drive or performance  · diarrhea  · hot flashes  · loss of appetite  · muscle, joint, or bone pains  · nausea  · redness or irritation at site where injected  · skin problems or acne  This list may not describe all possible side effects. Call your doctor for medical advice about side effects. You may report side effects to FDA at 2-620-HBE-2659.  Where should I keep my medicine?  Keep out of the reach of children.  Store below 25 degrees C (77 degrees F). Do not freeze. Protect from light. Do not use if it is not clear or if there are particles present. Throw away any unused medicine after the expiration date.  NOTE:This sheet is a summary. It may not cover all possible information. If you have questions about this medicine, talk to your doctor, pharmacist, or health care provider. Copyright© 2016 [employer]           
[facility]

## 2024-04-11 NOTE — PROGRESS NOTE ADULT - PROBLEM SELECTOR PLAN 4
pt states that she lost about 30 lbs in the last 2 months  She is undergoing colonoscopy o/p to evaluate for weight loss  pt denies any blood in the stool  family history of colon ca in her father  f/u CA markers  pt to continue following o/p with colonoscopy  f/u CT C/A/P w IV con r/o malignancy  f/u Heme/onc Dr VILLALOBOS   per daughter pt had MRI OP lung nodules was seen - p/w sodium 130 -- downtrending  - Na 127 today -- no mental status change  - likely due to poor oral intake  - start NS  - nephro Dr Lopez consulted

## 2024-04-11 NOTE — PROGRESS NOTE ADULT - PROBLEM SELECTOR PLAN 9
hx of spinal stenosis takes morphine and percocet at home  - c/w pain regimen  - pain mgt team following

## 2024-04-11 NOTE — PROGRESS NOTE ADULT - PROBLEM SELECTOR PLAN 12
DVT: Lovenox    Dispo: pt is from home, lives with . Has 4 children involved in all medical decisions.

## 2024-04-11 NOTE — PROGRESS NOTE ADULT - SUBJECTIVE AND OBJECTIVE BOX
NP Note discussed with  Primary Attending    Patient is a 73y old  Female who presents with a chief complaint of L hip fracture (10 Apr 2024 18:47)      INTERVAL HPI/OVERNIGHT EVENTS:  73-year-old female from home ambulates with a walker at baseline with PMHx of HTN, COPD (not on O2 at home), current smoker with 60 ppy hx, hypothyroidism, spinal stenosis, urinary incontinence, presenting with left-sided lower back and hip pain after fall 2 weeks ago. CTH and cervical spine shows No gross acute intracranial hemorrhage. CT head no acute intracranial hemorrhage. CT pelvis shows Acute proximal left femoral fracture. Pt is admitted for L hip fracture and COPD exacerbation 2/2 PNA.  Ortho following, recommending left hip IM nailing. Pt is not agreeable for the procedure at this time, will re-visit this discussion as per ortho. Also, there was a concern for slurred speech and possible stroke. Neuro following, recommending adequate imaging evaluation of her spine, especially the cervical spine, should precede operation (precede getting intubated for respiratory support). As per pt and family, she is allergic to contrast dye (unable to recall the reaction) hence CT scan with contrast is unable to perform. Pt recently had MR of lumbar spine 3 weeks ago, unable to remember if she received Gadolinium. Family will call [facility] radiology if Gadolinium was given. If she received Gadolinium, we will proceed with MR head C/T-spine w/w/o contrast as per neuro recs.  Nephro consulted for hyponatremia and pulmonology for COPD exacerbation    Spoke with daughter Sallie,  and patient in lengthy, updated about the plan of care, answered all questions. Pt is complaining of generalized pain, pain mgt following    MEDICATIONS  (STANDING):  acetaminophen     Tablet .. 1000 milliGRAM(s) Oral every 8 hours  albuterol/ipratropium for Nebulization 3 milliLiter(s) Nebulizer every 6 hours  budesonide 160 MICROgram(s)/formoterol 4.5 MICROgram(s) Inhaler 2 Puff(s) Inhalation two times a day  chlorhexidine 2% Cloths 1 Application(s) Topical <User Schedule>  gabapentin 100 milliGRAM(s) Oral every 8 hours  heparin   Injectable 5000 Unit(s) SubCutaneous every 8 hours  levothyroxine 112 MICROGram(s) Oral daily  lidocaine   4% Patch 1 Patch Transdermal daily  lidocaine   4% Patch 2 Patch Transdermal daily  losartan 25 milliGRAM(s) Oral daily  naloxone Injectable 0.4 milliGRAM(s) IV Push once  nicotine -   7 mG/24Hr(s) Patch 1 Patch Transdermal daily  piperacillin/tazobactam IVPB.. 3.375 Gram(s) IV Intermittent every 8 hours  polyethylene glycol 3350 17 Gram(s) Oral daily  predniSONE   Tablet 40 milliGRAM(s) Oral daily  senna 2 Tablet(s) Oral at bedtime    MEDICATIONS  (PRN):  albuterol    90 MICROgram(s) HFA Inhaler 2 Puff(s) Inhalation every 6 hours PRN Bronchospasm  ALPRAZolam 1 milliGRAM(s) Oral every 12 hours PRN Anxiety  benzonatate 100 milliGRAM(s) Oral every 8 hours PRN Cough  bisacodyl 5 milliGRAM(s) Oral daily PRN Constipation  cyclobenzaprine 5 milliGRAM(s) Oral every 8 hours PRN Spasm  oxyCODONE    IR 10 milliGRAM(s) Oral every 4 hours PRN Severe Pain (7 - 10)  oxyCODONE    IR 5 milliGRAM(s) Oral every 4 hours PRN Moderate Pain (4 - 6)      __________________________________________________  REVIEW OF SYSTEMS:  States "not too good"  CONSTITUTIONAL: No fever,   EYES: no acute visual disturbances  NECK: + generalized pain  RESPIRATORY: No cough; No shortness of breath  CARDIOVASCULAR: No chest pain, no palpitations  GASTROINTESTINAL: No pain. No nausea or vomiting; No diarrhea   NEUROLOGICAL: No headache or numbness, no tremors  MUSCULOSKELETAL: + generalized pain  GENITOURINARY: no dysuria, no frequency, no hesitancy  ALL OTHER  ROS negative        Vital Signs Last 24 Hrs  T(C): 36.9 (11 Apr 2024 04:49), Max: 37.3 (10 Apr 2024 14:24)  T(F): 98.4 (11 Apr 2024 04:49), Max: 99.1 (10 Apr 2024 14:24)  HR: 83 (11 Apr 2024 04:49) (83 - 96)  BP: 161/96 (11 Apr 2024 06:47) (147/88 - 183/98)  BP(mean): 118 (11 Apr 2024 06:47) (118 - 118)  RR: 18 (11 Apr 2024 04:49) (17 - 18)  SpO2: 94% (11 Apr 2024 04:49) (94% - 98%)    Parameters below as of 11 Apr 2024 04:49  Patient On (Oxygen Delivery Method): nasal cannula  O2 Flow (L/min): 2      ________________________________________________  PHYSICAL EXAM:  GENERAL: NAD, cachectic  HEENT: Normocephalic; conjunctivae and sclerae clear; moist mucous membranes;   NECK : supple  CHEST/LUNG: RLL rales   HEART: S1 S2  regular;  ABDOMEN: Soft, Nontender, distended; Bowel sounds present  EXTREMITIES: no cyanosis; no edema; no calf tenderness  SKIN: warm and dry; no rash  NERVOUS SYSTEM:  Awake and alert; Oriented to place, person and time    _________________________________________________  LABS:                        11.9   8.43  )-----------( 187      ( 11 Apr 2024 06:56 )             35.0     04-11    127<L>  |  90<L>  |  11  ----------------------------<  127<H>  2.9<LL>   |  31  |  0.40<L>    Ca    8.4      11 Apr 2024 06:56  Phos  3.4     04-10  Mg     1.7     04-10    TPro  6.0  /  Alb  x   /  TBili  x   /  DBili  x   /  AST  x   /  ALT  x   /  AlkPhos  x   04-11    PT/INR - ( 10 Apr 2024 05:10 )   PT: 10.9 sec;   INR: 0.95 ratio         PTT - ( 10 Apr 2024 05:10 )  PTT:29.3 sec  Urinalysis Basic - ( 11 Apr 2024 06:56 )    Color: x / Appearance: x / SG: x / pH: x  Gluc: 127 mg/dL / Ketone: x  / Bili: x / Urobili: x   Blood: x / Protein: x / Nitrite: x   Leuk Esterase: x / RBC: x / WBC x   Sq Epi: x / Non Sq Epi: x / Bacteria: x      CAPILLARY BLOOD GLUCOSE            RADIOLOGY & ADDITIONAL TESTS:  < from: CT Head No Cont (04.10.24 @ 15:55) >    ACC: [id number] EXAM:  CT BRAIN   ORDERED BY: ZACH RODRIGUEZ     PROCEDURE DATE:  04/10/2024          INTERPRETATION:  Clinical Indication: Follow-up trauma, altered mental   status, left hip fracture    5mm axial sections of the brain were obtained from base to vertex,   without the intravenous administration of contrast material. Coronal and   sagittal computer generated reconstructed views are available.    Comparison is made with prior CT of 4/9/2024 and demonstrates no   significant intervalchange.    The ventricles and sulci are prominent consistent age appropriate   involutional changes. Small vessel white matter ischemic changes are   noted. There is no hemorrhage, mass, or shift of midline structures. Bone   window examination is unremarkable.    IMPRESSION: Age-appropriate involutional and ischemic gliotic changes. No   hemorrhage. No change since 4/9/2024.    --- End of Report ---            JEFF SRIVASTAVA MD; Attending Radiologist  This document has been electronically signed.Apr 10 2024  4:49PM    < end of copied text >    Imaging Personally Reviewed:  YES/NO    Consultant(s) Notes Reviewed:   YES/ No    Care Discussed with Consultants :     Plan of care was discussed with patient and /or primary care giver; all questions and concerns were addressed and care was aligned with patient's wishes.     NP Note discussed with  Primary Attending    Patient is a 73y old  Female who presents with a chief complaint of L hip fracture (10 Apr 2024 18:47)      INTERVAL HPI/OVERNIGHT EVENTS:  73-year-old female from home ambulates with a walker at baseline with PMHx of HTN, COPD (not on O2 at home), current smoker with 60 ppy hx, hypothyroidism, spinal stenosis, urinary incontinence, presenting with left-sided lower back and hip pain after fall 2 weeks ago. CTH and cervical spine shows No gross acute intracranial hemorrhage. CT head no acute intracranial hemorrhage. CT pelvis shows Acute proximal left femoral fracture. Pt is admitted for L hip fracture and COPD exacerbation 2/2 PNA.  Ortho following, recommending left hip IM nailing. Pt is not agreeable for the procedure at this time, will re-visit this discussion as per ortho. Also, there was a concern for slurred speech and possible stroke. Neuro following, recommending adequate imaging evaluation of her spine, especially the cervical spine, should precede operation (precede getting intubated for respiratory support). As per pt and family, she is allergic to contrast dye (unable to recall the reaction) hence CT scan with contrast is unable to perform. Pt recently had MR of lumbar spine non con (3 weeks ago). At this time, we will proceed with MR head C/T-spine non contrast as per neuro recs.  Nephro consulted for hyponatremia and pulmonology for COPD exacerbation    Spoke with daughter Sallie,  and patient in lengthy, updated about the plan of care, answered all questions. Pt is complaining of generalized pain, pain mgt following    MEDICATIONS  (STANDING):  acetaminophen     Tablet .. 1000 milliGRAM(s) Oral every 8 hours  albuterol/ipratropium for Nebulization 3 milliLiter(s) Nebulizer every 6 hours  budesonide 160 MICROgram(s)/formoterol 4.5 MICROgram(s) Inhaler 2 Puff(s) Inhalation two times a day  chlorhexidine 2% Cloths 1 Application(s) Topical <User Schedule>  gabapentin 100 milliGRAM(s) Oral every 8 hours  heparin   Injectable 5000 Unit(s) SubCutaneous every 8 hours  levothyroxine 112 MICROGram(s) Oral daily  lidocaine   4% Patch 1 Patch Transdermal daily  lidocaine   4% Patch 2 Patch Transdermal daily  losartan 25 milliGRAM(s) Oral daily  naloxone Injectable 0.4 milliGRAM(s) IV Push once  nicotine -   7 mG/24Hr(s) Patch 1 Patch Transdermal daily  piperacillin/tazobactam IVPB.. 3.375 Gram(s) IV Intermittent every 8 hours  polyethylene glycol 3350 17 Gram(s) Oral daily  predniSONE   Tablet 40 milliGRAM(s) Oral daily  senna 2 Tablet(s) Oral at bedtime    MEDICATIONS  (PRN):  albuterol    90 MICROgram(s) HFA Inhaler 2 Puff(s) Inhalation every 6 hours PRN Bronchospasm  ALPRAZolam 1 milliGRAM(s) Oral every 12 hours PRN Anxiety  benzonatate 100 milliGRAM(s) Oral every 8 hours PRN Cough  bisacodyl 5 milliGRAM(s) Oral daily PRN Constipation  cyclobenzaprine 5 milliGRAM(s) Oral every 8 hours PRN Spasm  oxyCODONE    IR 10 milliGRAM(s) Oral every 4 hours PRN Severe Pain (7 - 10)  oxyCODONE    IR 5 milliGRAM(s) Oral every 4 hours PRN Moderate Pain (4 - 6)      __________________________________________________  REVIEW OF SYSTEMS:  States "not too good"  CONSTITUTIONAL: No fever,   EYES: no acute visual disturbances  NECK: + generalized pain  RESPIRATORY: No cough; No shortness of breath  CARDIOVASCULAR: No chest pain, no palpitations  GASTROINTESTINAL: No pain. No nausea or vomiting; No diarrhea   NEUROLOGICAL: No headache or numbness, no tremors  MUSCULOSKELETAL: + generalized pain  GENITOURINARY: no dysuria, no frequency, no hesitancy  ALL OTHER  ROS negative        Vital Signs Last 24 Hrs  T(C): 36.9 (11 Apr 2024 04:49), Max: 37.3 (10 Apr 2024 14:24)  T(F): 98.4 (11 Apr 2024 04:49), Max: 99.1 (10 Apr 2024 14:24)  HR: 83 (11 Apr 2024 04:49) (83 - 96)  BP: 161/96 (11 Apr 2024 06:47) (147/88 - 183/98)  BP(mean): 118 (11 Apr 2024 06:47) (118 - 118)  RR: 18 (11 Apr 2024 04:49) (17 - 18)  SpO2: 94% (11 Apr 2024 04:49) (94% - 98%)    Parameters below as of 11 Apr 2024 04:49  Patient On (Oxygen Delivery Method): nasal cannula  O2 Flow (L/min): 2      ________________________________________________  PHYSICAL EXAM:  GENERAL: NAD, cachectic  HEENT: Normocephalic; conjunctivae and sclerae clear; moist mucous membranes;   NECK : supple  CHEST/LUNG: RLL rales   HEART: S1 S2  regular;  ABDOMEN: Soft, Nontender, distended; Bowel sounds present  EXTREMITIES: no cyanosis; no edema; no calf tenderness  SKIN: warm and dry; no rash  NERVOUS SYSTEM:  Awake and alert; Oriented to place, person and time    _________________________________________________  LABS:                        11.9   8.43  )-----------( 187      ( 11 Apr 2024 06:56 )             35.0     04-11    127<L>  |  90<L>  |  11  ----------------------------<  127<H>  2.9<LL>   |  31  |  0.40<L>    Ca    8.4      11 Apr 2024 06:56  Phos  3.4     04-10  Mg     1.7     04-10    TPro  6.0  /  Alb  x   /  TBili  x   /  DBili  x   /  AST  x   /  ALT  x   /  AlkPhos  x   04-11    PT/INR - ( 10 Apr 2024 05:10 )   PT: 10.9 sec;   INR: 0.95 ratio         PTT - ( 10 Apr 2024 05:10 )  PTT:29.3 sec  Urinalysis Basic - ( 11 Apr 2024 06:56 )    Color: x / Appearance: x / SG: x / pH: x  Gluc: 127 mg/dL / Ketone: x  / Bili: x / Urobili: x   Blood: x / Protein: x / Nitrite: x   Leuk Esterase: x / RBC: x / WBC x   Sq Epi: x / Non Sq Epi: x / Bacteria: x      CAPILLARY BLOOD GLUCOSE            RADIOLOGY & ADDITIONAL TESTS:  < from: CT Head No Cont (04.10.24 @ 15:55) >    ACC: [id number] EXAM:  CT BRAIN   ORDERED BY: ZACH RODRIGUEZ     PROCEDURE DATE:  04/10/2024          INTERPRETATION:  Clinical Indication: Follow-up trauma, altered mental   status, left hip fracture    5mm axial sections of the brain were obtained from base to vertex,   without the intravenous administration of contrast material. Coronal and   sagittal computer generated reconstructed views are available.    Comparison is made with prior CT of 4/9/2024 and demonstrates no   significant intervalchange.    The ventricles and sulci are prominent consistent age appropriate   involutional changes. Small vessel white matter ischemic changes are   noted. There is no hemorrhage, mass, or shift of midline structures. Bone   window examination is unremarkable.    IMPRESSION: Age-appropriate involutional and ischemic gliotic changes. No   hemorrhage. No change since 4/9/2024.    --- End of Report ---            JEFF SRIVASTAVA MD; Attending Radiologist  This document has been electronically signed.Apr 10 2024  4:49PM    < end of copied text >    Imaging Personally Reviewed:  YES/NO    Consultant(s) Notes Reviewed:   YES/ No    Care Discussed with Consultants :     Plan of care was discussed with patient and /or primary care giver; all questions and concerns were addressed and care was aligned with patient's wishes.

## 2024-04-11 NOTE — PROGRESS NOTE ADULT - PROBLEM SELECTOR PLAN 8
pt is current smoker  smokes 3 cigarettes a day  with hx of 60 + ppy  c/w nicotine patches  pt was counseled on smoking cessation hx of hypothyroidism, takes levothyroxine 112 mcg qd  - continue home med synthroid

## 2024-04-11 NOTE — CONSULT NOTE ADULT - SUBJECTIVE AND OBJECTIVE BOX
[facility] NEPHROLOGY- CONSULTATION NOTE    Patient is a 72yo Female with HTN on Losartan, active smoker with COPD ( not on O2 at home), hypothyroidism, spinal stenosis, urinary incontinence, p/w left hip and back pain s/p fall 2 weeks PTA a/w left hip fracture, SOB 2/2 PNA vs COPD exacerbation with hyponatremia and hypokalemia. Nephrology consulted for electrolyte abnormalities.     Pt is poor historian and sleeping through out conversation. Pt denies any n/v/d.  +SOB    PAST MEDICAL & SURGICAL HISTORY:  HTN (hypertension)      Hypothyroidism      COPD (chronic obstructive pulmonary disease)      Spinal stenosis      Current smoker      H/O foot surgery        contrast media (iodine-based) (Unknown)    Home Medications Reviewed  Hospital Medications:   MEDICATIONS  (STANDING):  acetaminophen     Tablet .. 1000 milliGRAM(s) Oral every 8 hours  albuterol/ipratropium for Nebulization 3 milliLiter(s) Nebulizer every 6 hours  budesonide 160 MICROgram(s)/formoterol 4.5 MICROgram(s) Inhaler 2 Puff(s) Inhalation two times a day  chlorhexidine 2% Cloths 1 Application(s) Topical <User Schedule>  gabapentin 100 milliGRAM(s) Oral every 8 hours  heparin   Injectable 5000 Unit(s) SubCutaneous every 8 hours  levothyroxine 112 MICROGram(s) Oral daily  lidocaine   4% Patch 2 Patch Transdermal daily  lidocaine   4% Patch 1 Patch Transdermal daily  losartan 25 milliGRAM(s) Oral daily  naloxone Injectable 0.4 milliGRAM(s) IV Push once  nicotine -   7 mG/24Hr(s) Patch 1 Patch Transdermal daily  piperacillin/tazobactam IVPB.. 3.375 Gram(s) IV Intermittent every 8 hours  polyethylene glycol 3350 17 Gram(s) Oral daily  predniSONE   Tablet 40 milliGRAM(s) Oral daily  senna 2 Tablet(s) Oral at bedtime  sodium chloride 0.9%. 1000 milliLiter(s) (75 mL/Hr) IV Continuous <Continuous>    SOCIAL HISTORY: +smoker  Denies ETOh,  or drug use  FAMILY HISTORY:  FH: colon cancer (Father)        REVIEW OF SYSTEMS:  Gen: +weight loss  HEENT: no rhinorrhea  Neck: no sore throat  Cards: no chest pain  Resp: +dyspnea  GI: no nausea or vomiting or diarrhea  : no dysuria or hematuria  Vascular: no LE edema +left hip and back pain  Derm: no rashes  Neuro: no numbness/tingling  All other review of systems is negative unless indicated above.    VITALS:  T(F): 97.9 (04-11-24 @ 13:37), Max: 98.8 (04-10-24 @ 17:04)  HR: 85 (04-11-24 @ 13:37)  BP: 167/91 (04-11-24 @ 13:37)  RR: 18 (04-11-24 @ 13:37)  SpO2: 96% (04-11-24 @ 13:37)  Wt(kg): --    04-10 @ 07:01  -  04-11 @ 07:00  --------------------------------------------------------  IN: 0 mL / OUT: 300 mL / NET: -300 mL        PHYSICAL EXAM:  Gen: Cachectic  HEENT: MMM  Neck: no JVD  Cards: RRR, +S1/S2, no M/G/R  Resp: CTA B/L  GI: soft, mild distention with tenderness  : +purewick  Extremities: trace LE edema B/L  Derm: no rashes    LABS:  04-11    127<L>  |  90<L>  |  11  ----------------------------<  127<H>  2.9<LL>   |  31  |  0.40<L>    Ca    8.4      11 Apr 2024 06:56  Phos  3.4     04-10  Mg     1.7     04-10    TPro  6.0  /  Alb      /  TBili      /  DBili      /  AST      /  ALT      /  AlkPhos      04-11    Creatinine Trend: 0.40 <--, 0.43 <--, 0.47 <--                        11.9   8.43  )-----------( 187      ( 11 Apr 2024 06:56 )             35.0     Urine Studies:  Urinalysis Basic - ( 11 Apr 2024 06:56 )    Color:  / Appearance:  / SG:  / pH:   Gluc: 127 mg/dL / Ketone:   / Bili:  / Urobili:    Blood:  / Protein:  / Nitrite:    Leuk Esterase:  / RBC:  / WBC    Sq Epi:  / Non Sq Epi:  / Bacteria:         RADIOLOGY & ADDITIONAL STUDIES:    < from: CT Chest No Cont (04.09.24 @ 17:06) >    ACC: [id number] EXAM:  CT CHEST   ORDERED BY: SAMEER KLEIN     PROCEDURE DATE:  04/09/2024          < end of copied text >  < from: CT Chest No Cont (04.09.24 @ 17:06) >    IMPRESSION: Loss of height of multiple thoracic and lumbar spine   vertebral bodies which are of indeterminate age. Clinical correlation for   back pain is recommended. Dedicated spinal MRI can be performed for   complete evaluation.    Dominant cluster of nodular and patchy opacities within the superior   segment of the right lower lobe suggestive of infection with   endobronchial spread. A 1-3 month follow-up noncontrast chest CT is   recommended to ensure improvement/resolution.    Emphysema.    Intrahepatic and extrahepatic biliary ductal dilatation is of unclear   etiology. Upper abdominal ultrasound can be performedfor further   evaluation as clinically warranted.    --- End of Report ---        < end of copied text >  < from: CT Pelvis Bony Only No Cont (04.09.24 @ 17:07) >    INTERPRETATION:  INDICATION:  left hip pain s/p fall    TECHNIQUE: CT of the pelvis without contrast with axial, sagittal, and   coronal multiplanar reformats.    Comparison:None available    FINDINGS:    Bones are diffusely demineralized.    Acute displaced, slightly comminuted fracture of the intertrochanteric   region of the proximal left femur with extension into the basicervical   and transcervical region of the left femoral neck superolaterally. Left   femoral head remains seated within the acetabulum. Bilateral hip joint   spaces are relatively preserved.    Age-indeterminate severe compression fracture of L5.    Degenerative changes within the visualized lower lumbar spine and   sacroiliac joints.    Soft tissue swelling/hematoma about the left hip fracture site. Stool   throughout the colon. Atherosclerotic calcifications are noted.    IMPRESSION:    Acute proximal left femoral fracture, as above.    Age-indeterminate severe compression fracture of the L5 vertebral body.    --- End of Report ---      < end of copied text >

## 2024-04-11 NOTE — PROGRESS NOTE ADULT - PROBLEM SELECTOR PLAN 5
- complains of slurred speech  - imaging negative for acute findings  - neuro Dr Keyes following -- MRs head, c-spine, t-spine. If possible wo AND w contrast  - pt is allergic to contrast dye  - will proceed with MRs non con due to unable to determine if pt is allergic to contrast iodine dye or Gadolinium. Pt recently had MR L-spine non con OP

## 2024-04-11 NOTE — CONSULT NOTE ADULT - PROBLEM SELECTOR RECOMMENDATION 9
Pt with acute left hip pain which is somatic and neuropathic in nature due to left femoral fracture. CT pelvis shows Acute proximal left femoral fracture, Ortho consulted, recommends Left hip IM nailing. Hx of chronic opioid use. High risk medications reviewed. Avoid polypharmacy. Avoid IV opioids. Avoid NSAIDs and benzodiazepines. Non-pharmacological sleep aides initiated. Non-opioid medications and non-pharmacological pain management measures initiated.  Opioid pain recommendations   - Start Oxycodone 5-10 mg PO q 4 hours PRN moderate-severe pain. Monitor for sedation/ respiratory depression.   - Start Morphine ER 60 mg po BID(home dose) if no contraindications.  Non-opioid pain recommendations   - Continue Acetaminophen 1 gram PO q 8 hours for 4 days. Monitor LFTs  - Continue Gabapentin 100 mg po q8h.  - Continue Lidocaine patches.  - Continue Flexeril 5 mg po q8h PRN  Bowel Regimen  - Continue Miralax 17G PO daily  - Continue Senna 2 tablets at bedtime for constipation  Mild pain   - Non-pharmacological pain treatment recommendations  - Warm/ Cool packs PRN   - Repositioning extremity, elevation, imagery, relaxation, distraction.  - Physical therapy OOB if no contraindications   Recommendations discussed with primary team and RN.

## 2024-04-11 NOTE — CONSULT NOTE ADULT - SUBJECTIVE AND OBJECTIVE BOX
Time of visit:    CHIEF COMPLAINT: Patient is a 73y old  Female who presents with a chief complaint of L hip fracture (11 Apr 2024 15:04)      HPI:  73-year-old female from home ambulates with a walker at baseline with PMH of HTN, COPD ( not on O2 at home), current smoker with 60 ppy hx, hypothyroidism, spinal stenosis urinary incontinence, presenting with left-sided lower back and hip pain after fall 2 weeks ago. Approximately 1-2 weeks ago the patient fell while walking and her leg "gave out" and since then she has had left-sided leg pain.  Patient reports she is able to walk with her cane/walker but feels very weak and unbalanced and has since fallen 2 more times. Pt also complains of SOB. Patient reporting chest pain since the fall today after her cane hit her chest. Patient denies any headache, dizziness, cough, congestion, fever, chills, N,V,D,Abd pain, numbness or tingling. In the ED, pt is comfortable, but complains of pain, NAD, pt is wheezing b/l with R sided crackles, pt was found to be hypoxic on RA to 80s saturating well on 1-2 L NC, hypertensive 175/105, wbc 9, Na 130. CTH and cervical spine shows No gross acute intracranial hemorrhage. No gross acute fracture cervical spine. CTC shows Loss of height of multiple thoracic and lumbar spine vertebral bodies which are of indeterminate age. Dominant cluster of nodular and patchy opacities within the superior segment of the right lower lobe suggestive of infection with endobronchial spread. Emphysema. Intrahepatic and extrahepatic biliary ductal dilatation is of unclear etiology. CT pelvis shows Acute proximal left femoral fracture,  Age-indeterminate severe compression fracture of the L5 vertebral body. Pt is admitted for L hip fracture/ SOB 2/2 PNA vs COPD exacerbation.    Off note: pt states that she lost about 30 lbs in the last 2 months. She is undergoing colonoscopy o/p to evaluate for weight loss. pt denies any blood in the stool. However, she has family history of colon ca in her father.  (09 Apr 2024 19:22)   Patient seen and examined.     PAST MEDICAL & SURGICAL HISTORY:  HTN (hypertension)      Hypothyroidism      COPD (chronic obstructive pulmonary disease)      Spinal stenosis      Current smoker      H/O foot surgery          Allergies    contrast media (iodine-based) (Unknown)    Intolerances        MEDICATIONS  (STANDING):  acetaminophen     Tablet .. 1000 milliGRAM(s) Oral every 8 hours  albuterol/ipratropium for Nebulization 3 milliLiter(s) Nebulizer every 6 hours  budesonide 160 MICROgram(s)/formoterol 4.5 MICROgram(s) Inhaler 2 Puff(s) Inhalation two times a day  chlorhexidine 2% Cloths 1 Application(s) Topical <User Schedule>  gabapentin 100 milliGRAM(s) Oral every 8 hours  heparin   Injectable 5000 Unit(s) SubCutaneous every 8 hours  levothyroxine 112 MICROGram(s) Oral daily  lidocaine   4% Patch 1 Patch Transdermal daily  lidocaine   4% Patch 2 Patch Transdermal daily  losartan 25 milliGRAM(s) Oral daily  naloxone Injectable 0.4 milliGRAM(s) IV Push once  nicotine -   7 mG/24Hr(s) Patch 1 Patch Transdermal daily  piperacillin/tazobactam IVPB.. 3.375 Gram(s) IV Intermittent every 8 hours  polyethylene glycol 3350 17 Gram(s) Oral daily  predniSONE   Tablet 40 milliGRAM(s) Oral daily  senna 2 Tablet(s) Oral at bedtime      MEDICATIONS  (PRN):  albuterol    90 MICROgram(s) HFA Inhaler 2 Puff(s) Inhalation every 6 hours PRN Bronchospasm  ALPRAZolam 1 milliGRAM(s) Oral every 12 hours PRN Anxiety  benzonatate 100 milliGRAM(s) Oral every 8 hours PRN Cough  bisacodyl 5 milliGRAM(s) Oral daily PRN Constipation  cyclobenzaprine 5 milliGRAM(s) Oral every 8 hours PRN Spasm  oxyCODONE    IR 5 milliGRAM(s) Oral every 4 hours PRN Moderate Pain (4 - 6)  oxyCODONE    IR 10 milliGRAM(s) Oral every 4 hours PRN Severe Pain (7 - 10)   Medications up to date at time of exam.    Medications up to date at time of exam.    FAMILY HISTORY:  FH: colon cancer (Father)        SOCIAL HISTORY  Smoking History: Current smoker, last smoke on 04-09-24- 3- 8  cigarettes Daily.   Living Condition: [   ] apartment, [   ] private house  Work History:   Travel History: denies recent travel  Illicit Substance Use: denies  Alcohol Use: denies    REVIEW OF SYSTEMS:    CONSTITUTIONAL:  No fevers, chills on exam. Denies night  sweats. Has  weight loss this past 2-3 Months.      HEENT:  Denies blurred vision, sore throat or runny nose.    CARDIOVASCULAR:  Denies chest discomfort on exam.     RESPIRATORY:  Presented with complains of SOB. Non productive cough. No wheezing.    GASTROINTESTINAL:  Denies abdominal pain. No vomiting on exam.     GENITOURINARY: No hematuria .     NEUROLOGIC:  No tremors. No seizures on exam.     PSYCHIATRIC:  No emotional distress .     PHYSICAL EXAMINATION:    GENERAL: Alert and oriented. No acute distress.     Vital Signs Last 24 Hrs  T(C): 36.6 (11 Apr 2024 13:37), Max: 37.1 (10 Apr 2024 17:04)  T(F): 97.9 (11 Apr 2024 13:37), Max: 98.8 (10 Apr 2024 17:04)  HR: 85 (11 Apr 2024 13:37) (83 - 96)  BP: 167/91 (11 Apr 2024 13:37) (147/88 - 183/98)  BP(mean): 118 (11 Apr 2024 06:47) (118 - 118)  RR: 18 (11 Apr 2024 13:37) (17 - 18)  SpO2: 96% (11 Apr 2024 13:37) (94% - 96%)    Parameters below as of 11 Apr 2024 13:37  Patient On (Oxygen Delivery Method): nasal cannula  O2 Flow (L/min): 2       HEENT: Mild bitemporal wasting. No nasal tenderness . Extraocular muscles are intact. Mucous membranes are moist.     NECK: Supple, no palpable adenopathy.    LUNGS: Fair air entrance. Non labored. +ve crakles on midlung . Decreased breath sounds to right lung side. No wheezing. No use of accessory muscle .     HEART: S1 S2 Regular rate and no click / rub.     ABDOMEN: Soft, nontender, and nondistended.  Active bowel sounds.     EXTREMITIES: Without any cyanosis, clubbing, rash, lesions or edema.    NEUROLOGIC: Awake, alert, oriented.     SKIN: Warm, dry, good turgor.      LABS:                        11.9   8.43  )-----------( 187      ( 11 Apr 2024 06:56 )             35.0     04-11    127<L>  |  90<L>  |  11  ----------------------------<  127<H>  2.9<LL>   |  31  |  0.40<L>    Ca    8.4      11 Apr 2024 06:56  Phos  3.4     04-10  Mg     1.7     04-10    TPro  6.0  /  Alb  x   /  TBili  x   /  DBili  x   /  AST  x   /  ALT  x   /  AlkPhos  x   04-11    PT/INR - ( 10 Apr 2024 05:10 )   PT: 10.9 sec;   INR: 0.95 ratio         PTT - ( 10 Apr 2024 05:10 )  PTT:29.3 sec  Urinalysis Basic - ( 11 Apr 2024 06:56 )    Color: x / Appearance: x / SG: x / pH: x  Gluc: 127 mg/dL / Ketone: x  / Bili: x / Urobili: x   Blood: x / Protein: x / Nitrite: x   Leuk Esterase: x / RBC: x / WBC x   Sq Epi: x / Non Sq Epi: x / Bacteria: x        CARDIAC MARKERS ( 11 Apr 2024 06:56 )  x     / x     / 157 U/L / x     / x      CARDIAC MARKERS ( 10 Apr 2024 20:28 )  x     / x     / 213 U/L / x     / x                    MICROBIOLOGY: (if applicable)    RADIOLOGY & ADDITIONAL STUDIES:  < from: CT Chest No Cont (04.09.24 @ 17:06) >    ACC: [id number] EXAM:  CT CHEST   ORDERED BY: SAMEER KLEIN     PROCEDURE DATE:  04/09/2024          INTERPRETATION:  CLINICAL INDICATION: CHEST PAIN, STATUS POST FALL,   SHORTNESS OF BREATH.    Axial CT images of the chest are obtained without intravenous   administration of contrast.    No prior chest CTs are available for comparison.    No enlarged axillary, mediastinal or hilar lymph nodes.    No pleural or pericardial effusions. Heart size is normal. Mitral annular   calcifications. Aortic intimal calcifications. Aorta is tortuous.    Evaluation of the upper abdomen demonstrate intrahepatic and extrahepatic   biliary ductal dilatation. For reference the partially imaged common bile   duct measures about 1 cm.    Evaluation of the lungs demonstrate emphysema. No pneumothorax. Impaction   of some of the bilateral lower lung segmental and subsegmental airways   likely due to internal secretions. Mild bilateral lower lung   intraparenchymal airways wall thickening, a nonspecific findingmay be   related to the history of smoking.    Right upper lobe apical branching linear ill-defined opacity containing   internal foci of calcification, part of which measures about 2.4 x 1.5 cm   may be due to scarring.    Cluster of a few ill-defined nodular and patchy opacities within the   superior segment of the right lower lobe largest measuring about 2.2 cm.   Other scattered bilateral nodular opacities are also noted with 1 of the   larger 1's in the right lower lobe in a para-aortic location measuring   about 6 mm.    No central endobronchial lesions.    Degenerative changes of the spine spine. Bones are osteopenic.    Loss of height of multiple thoracic and lumbar vertebral bodies which are   of indeterminate age. For reference there is severe loss of height of the   T7 and moderate loss of height of the T6 and T9 vertebral bodies. Spinal   kyphosis.    IMPRESSION: Loss of height of multiple thoracic and lumbar spine   vertebral bodies which are of indeterminate age. Clinical correlation for   back pain is recommended. Dedicated spinal MRI can be performed for   complete evaluation.    Dominant cluster of nodular and patchy opacities within the superior   segment of the right lower lobe suggestive of infection with   endobronchial spread. A 1-3 month follow-up noncontrast chest CT is   recommended to ensure improvement/resolution.    Emphysema.    Intrahepatic and extrahepatic biliary ductal dilatation is of unclear   etiology. Upper abdominal ultrasound can be performedfor further   evaluation as clinically warranted.    --- End of Report ---            GUERO KNIGHT MD; Attending Radiologist  This document has been electronically signed. Apr 9 2024  5:20PM    < end of copied text >    CT : < from: CT Abdomen and Pelvis No Cont (04.11.24 @ 15:14) >    ACC: [id number] EXAM:  CT ABDOMEN AND PELVIS   ORDERED BY: TAMMI CHRISTIE     PROCEDURE DATE:  04/11/2024          INTERPRETATION:  CLINICAL INFORMATION: 73 years  Female with r/o   malignancy.    COMPARISON: None.    CONTRAST/COMPLICATIONS:  IV Contrast: NONE  Oral Contrast: NONE  Complications: None reported at time of study completion    PROCEDURE:  CT of the Abdomen and Pelvis was performed.  Sagittal and coronal reformats were performed.    LIMITATIONS: Evaluation of the solid organs, vascular structures and GI   tract is limited without oral and IV contrast.    FINDINGS:  LOWER CHEST: Trace bilateral pleural effusions.    LIVER: Within normal limits.  BILE DUCTS: Normal caliber.  GALLBLADDER: Within normal limits.  SPLEEN: Within normal limits.  PANCREAS: Within normal limits.  ADRENALS: Within normal limits.  KIDNEYS/URETERS: Trace bilateral hydronephrosis.    BLADDER: Markedly distended measuring 16 cm sagittal.  REPRODUCTIVE ORGANS: Unremarkable uterus.    BOWEL: No bowel obstruction. Appendix is not visualized and cannot be   assessed.. Moderate colonic stool burden.  PERITONEUM: No ascites.  VESSELS: Atherosclerotic changes.  RETROPERITONEUM/LYMPH NODES: No lymphadenopathy.  ABDOMINAL WALL: Anasarca.  BONES: Osteoporosis.Moderate to severe L1, L2, L4 and L5 compression   fractures. No osseous retropulsion.    IMPRESSION:  Evaluation of the solid organs, vascular structures and GI tract is   limited without oral and IV contrast.    Limited evaluation for malignancy.    Distended bladder. Mild hydronephrosis may be related to the bladder   distention.    Moderate constipation.    Other chronic findings as above.        --- End of Report ---            JANNET HERNANDEZ MD; Attending Radiologist  This document has been electronically signed. Apr 11 2024  3:25PM    < end of copied text >     CXR:  ECHO:    IMPRESSION: 73y Female PAST MEDICAL & SURGICAL HISTORY:  HTN (hypertension)      Hypothyroidism      COPD (chronic obstructive pulmonary disease)      Spinal stenosis      Current smoker    Impression: This is a 72 Y/O Female   H/O foot surgery       Impression: This is a 72 Y/O Female with s/p multiple Falls at Home and sustained Left Hip fracture . Presented with complains of episode of SOB , hypoxia in ED with o2 saturation 80s room air due to Acute hypoxic respiratory failure secondary to combination of Right lung Pneumonia showing on CT chest   and COPD exacerbation , Active smoker . Negative swab for RVP, Covid. Negative UA for legionella .     Suggestion:  O2 saturation 96% with O2 supplement. Continue Oxygen supplementation 2L NC .  Continue Duoneb via nebulization Q 6 hours . Do not give at the same time with PRN Albuterol 90 mcg 2 puffs Q 6 Hours.   Continue Symbicort 160- 4.5 mcg 2 puffs Twice daily. using Wixela Twice daily outpatient .   On Zosyn 3.375 Gm IVPB Q 8 Hours .  Continue Prednisone 40 mg Oral daily x 5 days then taper to 20 mg Oral Daily x 5 days.   Orthopedic following .   Reinforced the importance of smoking cessation , has Rt arm nicotine patch on x 12 hours.     Daughter Sallie ,  at bedside and all questions answered and updated with pulmonary plan of care.

## 2024-04-11 NOTE — PROGRESS NOTE ADULT - PROBLEM SELECTOR PLAN 10
dvt ppx: lovenox pt is current smoker  - smokes 3 cigarettes a day, with hx of 60 + ppy  - c/w nicotine patches  - pt was counseled on smoking cessation

## 2024-04-11 NOTE — PROGRESS NOTE ADULT - SUBJECTIVE AND OBJECTIVE BOX
Reason for consult:    HPI:  73-year-old female from home ambulates with a walker at baseline with PMH of HTN, COPD ( not on O2 at home), current smoker with 60 ppy hx, hypothyroidism, spinal stenosis urinary incontinence, presenting with left-sided lower back and hip pain after fall 2 weeks ago. Approximately 1-2 weeks ago the patient fell while walking and her leg "gave out" and since then she has had left-sided leg pain.  Patient reports she is able to walk with her cane/walker but feels very weak and unbalanced and has since fallen 2 more times. Pt also complains of SOB. Patient reporting chest pain since the fall today after her cane hit her chest. Patient denies any headache, dizziness, cough, congestion, fever, chills, N,V,D,Abd pain, numbness or tingling. In the ED, pt is comfortable, but complains of pain, NAD, pt is wheezing b/l with R sided crackles, pt was found to be hypoxic on RA to 80s saturating well on 1-2 L NC, hypertensive 175/105, wbc 9, Na 130. CTH and cervical spine shows No gross acute intracranial hemorrhage. No gross acute fracture cervical spine. CTC shows Loss of height of multiple thoracic and lumbar spine vertebral bodies which are of indeterminate age. Dominant cluster of nodular and patchy opacities within the superior segment of the right lower lobe suggestive of infection with endobronchial spread. Emphysema. Intrahepatic and extrahepatic biliary ductal dilatation is of unclear etiology. CT pelvis shows Acute proximal left femoral fracture,  Age-indeterminate severe compression fracture of the L5 vertebral body. Pt is admitted for L hip fracture/ SOB 2/2 PNA vs COPD exacerbation.    Off note: pt states that she lost about 30 lbs in the last 2 months. She is undergoing colonoscopy o/p to evaluate for weight loss. pt denies any blood in the stool. However, she has family history of colon ca in her father.  (09 Apr 2024 19:22)    Subjective;  Patient seen at bedside.  Patient is complaining of sob, left hip pain.   No overnight events.       Allergies    No Known Allergies    Intolerances    MEDICATIONS  (STANDING):  acetaminophen     Tablet .. 1000 milliGRAM(s) Oral every 8 hours  albuterol/ipratropium for Nebulization 3 milliLiter(s) Nebulizer every 6 hours  ALPRAZolam 0.5 milliGRAM(s) Oral daily  budesonide 160 MICROgram(s)/formoterol 4.5 MICROgram(s) Inhaler 2 Puff(s) Inhalation two times a day  chlorhexidine 2% Cloths 1 Application(s) Topical <User Schedule>  enoxaparin Injectable 40 milliGRAM(s) SubCutaneous every 24 hours  gabapentin 100 milliGRAM(s) Oral every 8 hours  levothyroxine 112 MICROGram(s) Oral daily  lidocaine   4% Patch 1 Patch Transdermal daily  losartan 25 milliGRAM(s) Oral daily  naloxone Injectable 0.4 milliGRAM(s) IV Push once  nicotine -   7 mG/24Hr(s) Patch 1 Patch Transdermal daily  piperacillin/tazobactam IVPB.. 3.375 Gram(s) IV Intermittent every 8 hours  polyethylene glycol 3350 17 Gram(s) Oral daily  predniSONE   Tablet 40 milliGRAM(s) Oral daily  senna 2 Tablet(s) Oral at bedtime    MEDICATIONS  (PRN):  albuterol    90 MICROgram(s) HFA Inhaler 2 Puff(s) Inhalation every 6 hours PRN Bronchospasm  benzonatate 100 milliGRAM(s) Oral every 8 hours PRN Cough  bisacodyl 5 milliGRAM(s) Oral daily PRN Constipation  cyclobenzaprine 5 milliGRAM(s) Oral every 8 hours PRN Spasm  oxyCODONE    IR 2.5 milliGRAM(s) Oral every 4 hours PRN Moderate Pain (4 - 6)  oxyCODONE    IR 5 milliGRAM(s) Oral every 4 hours PRN Severe Pain (7 - 10)      ROS  14 point ROS negative except for above.     Vital Signs Last 24 Hrs  T(C): 36.6 (11 Apr 2024 13:37), Max: 37.3 (10 Apr 2024 14:24)  T(F): 97.9 (11 Apr 2024 13:37), Max: 99.1 (10 Apr 2024 14:24)  HR: 85 (11 Apr 2024 13:37) (83 - 96)  BP: 167/91 (11 Apr 2024 13:37) (147/88 - 183/98)  BP(mean): 118 (11 Apr 2024 06:47) (118 - 118)  RR: 18 (11 Apr 2024 13:37) (17 - 18)  SpO2: 96% (11 Apr 2024 13:37) (94% - 98%)    Parameters below as of 11 Apr 2024 13:37  Patient On (Oxygen Delivery Method): nasal cannula  O2 Flow (L/min): 2      PE  NAD  Awake, alert  Anicteric, MMM  RRR  CTAB  Abd soft, NT, ND  No c/c/e  No rash grossly  FROM                                 11.9   8.43  )-----------( 187      ( 11 Apr 2024 06:56 )             35.0   04-11    127<L>  |  90<L>  |  11  ----------------------------<  127<H>  2.9<LL>   |  31  |  0.40<L>    Ca    8.4      11 Apr 2024 06:56  Phos  3.4     04-10  Mg     1.7     04-10    TPro  6.0  /  Alb  x   /  TBili  x   /  DBili  x   /  AST  x   /  ALT  x   /  AlkPhos  x   04-11

## 2024-04-11 NOTE — CONSULT NOTE ADULT - SUBJECTIVE AND OBJECTIVE BOX
Source of information: [facility], Chart review  Patient language: English  : n/a    HPI:  73-year-old female from home ambulates with a walker at baseline with PMH of HTN, COPD ( not on O2 at home), current smoker with 60 ppy hx, hypothyroidism, spinal stenosis urinary incontinence, presenting with left-sided lower back and hip pain after fall 2 weeks ago. Approximately 1-2 weeks ago the patient fell while walking and her leg "gave out" and since then she has had left-sided leg pain.  Patient reports she is able to walk with her cane/walker but feels very weak and unbalanced and has since fallen 2 more times. Pt also complains of SOB. Patient reporting chest pain since the fall today after her cane hit her chest. Patient denies any headache, dizziness, cough, congestion, fever, chills, N,V,D,Abd pain, numbness or tingling. In the ED, pt is comfortable, but complains of pain, NAD, pt is wheezing b/l with R sided crackles, pt was found to be hypoxic on RA to 80s saturating well on 1-2 L NC, hypertensive 175/105, wbc 9, Na 130. CTH and cervical spine shows No gross acute intracranial hemorrhage. No gross acute fracture cervical spine. CTC shows Loss of height of multiple thoracic and lumbar spine vertebral bodies which are of indeterminate age. Dominant cluster of nodular and patchy opacities within the superior segment of the right lower lobe suggestive of infection with endobronchial spread. Emphysema. Intrahepatic and extrahepatic biliary ductal dilatation is of unclear etiology. CT pelvis shows Acute proximal left femoral fracture,  Age-indeterminate severe compression fracture of the L5 vertebral body. Pt is admitted for L hip fracture/ SOB 2/2 PNA vs COPD exacerbation.    Off note: pt states that she lost about 30 lbs in the last 2 months. She is undergoing colonoscopy o/p to evaluate for weight loss. pt denies any blood in the stool. However, she has family history of colon ca in her father.  (09 Apr 2024 19:22)    Pt is admitted for s/p fall, left hip pain. CT pelvis shows Acute proximal left femoral fracture,  Age-indeterminate severe compression fracture of the L5 vertebral body.  Pain consulted for left hip pain. Pt with Hx of chronic opioid dependence. I-Stop reviewed. Pt on Morphine 60 mg ER PO BID, last refill on 3/2024, oxycodone and xanax. Pt seen and examined at bedside this morning. Reports pain score 10/10 on left hip and lowerback.  SCALE USED: (1-10 VNRS). Pt describes pain as sharp pain,  radiating to lowerback. Non alleviated by pain medication and exacerbated by movement. Pt tolerating PO diet. Denies lethargy, nausea, vomiting, constipation, itchiness. Reports last BM 4/7. Patient stated goal for pain control: to be able to take deep breaths, get out of bed to chair and ambulate with tolerable pain control. Pt reports walking with cane and walker baseline. Ortho consulted, recommends Left hip IM nailing.     PAST MEDICAL & SURGICAL HISTORY:  HTN (hypertension)    Hypothyroidism    COPD (chronic obstructive pulmonary disease)    Spinal stenosis    Current smoker    H/O foot surgery    FAMILY HISTORY:  FH: colon cancer (Father)    Social History:  lives with  ambulates with a cane or walker (09 Apr 2024 19:22)   [x] Denies ETOH use, illicit drug use   [x] smoking    Allergies    contrast media (iodine-based) (Unknown)    Intolerances    MEDICATIONS  (STANDING):  acetaminophen     Tablet .. 1000 milliGRAM(s) Oral every 8 hours  albuterol/ipratropium for Nebulization 3 milliLiter(s) Nebulizer every 6 hours  budesonide 160 MICROgram(s)/formoterol 4.5 MICROgram(s) Inhaler 2 Puff(s) Inhalation two times a day  chlorhexidine 2% Cloths 1 Application(s) Topical <User Schedule>  gabapentin 100 milliGRAM(s) Oral every 8 hours  heparin   Injectable 5000 Unit(s) SubCutaneous every 8 hours  levothyroxine 112 MICROGram(s) Oral daily  lidocaine   4% Patch 1 Patch Transdermal daily  lidocaine   4% Patch 2 Patch Transdermal daily  losartan 25 milliGRAM(s) Oral daily  naloxone Injectable 0.4 milliGRAM(s) IV Push once  nicotine -   7 mG/24Hr(s) Patch 1 Patch Transdermal daily  piperacillin/tazobactam IVPB.. 3.375 Gram(s) IV Intermittent every 8 hours  polyethylene glycol 3350 17 Gram(s) Oral daily  potassium chloride   Powder 40 milliEquivalent(s) Oral once  predniSONE   Tablet 40 milliGRAM(s) Oral daily  senna 2 Tablet(s) Oral at bedtime    MEDICATIONS  (PRN):  albuterol    90 MICROgram(s) HFA Inhaler 2 Puff(s) Inhalation every 6 hours PRN Bronchospasm  ALPRAZolam 1 milliGRAM(s) Oral every 12 hours PRN Anxiety  benzonatate 100 milliGRAM(s) Oral every 8 hours PRN Cough  bisacodyl 5 milliGRAM(s) Oral daily PRN Constipation  cyclobenzaprine 5 milliGRAM(s) Oral every 8 hours PRN Spasm  oxyCODONE    IR 5 milliGRAM(s) Oral every 4 hours PRN Moderate Pain (4 - 6)  oxyCODONE    IR 10 milliGRAM(s) Oral every 4 hours PRN Severe Pain (7 - 10)      Vital Signs Last 24 Hrs  T(C): 36.7 (12 Apr 2024 06:11), Max: 36.7 (11 Apr 2024 20:54)  T(F): 98 (12 Apr 2024 06:11), Max: 98 (11 Apr 2024 20:54)  HR: 90 (12 Apr 2024 00:27) (85 - 90)  BP: 159/82 (12 Apr 2024 06:11) (130/79 - 167/91)  BP(mean): 107 (12 Apr 2024 06:11) (96 - 107)  RR: 20 (12 Apr 2024 06:11) (18 - 20)  SpO2: 94% (12 Apr 2024 06:11) (94% - 97%)    Parameters below as of 12 Apr 2024 06:11  Patient On (Oxygen Delivery Method): nasal cannula  O2 Flow (L/min): 2    LABS: Reviewed.                        11.6   8.42  )-----------( 188      ( 12 Apr 2024 07:37 )             34.9     04-12    137  |  100  |  8   ----------------------------<  140<H>  3.1<L>   |  31  |  0.40<L>    Ca    8.1<L>      12 Apr 2024 07:37  Phos  2.5     04-12  Mg     2.0     04-12    TPro  5.9<L>  /  Alb  2.6<L>  /  TBili  0.5  /  DBili  x   /  AST  21  /  ALT  19  /  AlkPhos  78  04-12    LIVER FUNCTIONS - ( 12 Apr 2024 07:37 )  Alb: 2.6 g/dL / Pro: 5.9 g/dL / ALK PHOS: 78 U/L / ALT: 19 U/L DA / AST: 21 U/L / GGT: x           Urinalysis Basic - ( 12 Apr 2024 07:37 )    Color: x / Appearance: x / SG: x / pH: x  Gluc: 140 mg/dL / Ketone: x  / Bili: x / Urobili: x   Blood: x / Protein: x / Nitrite: x   Leuk Esterase: x / RBC: x / WBC x   Sq Epi: x / Non Sq Epi: x / Bacteria: x    CAPILLARY BLOOD GLUCOSE    SARS-CoV-2: NotDetec (09 Apr 2024 21:24)    Radiology: Reviewed.   ACC: [id number] EXAM:  MR BRAIN   ORDERED BY: TAMMI CHRISTIE     ACC: [id number] EXAM:  MR SPINE THORACIC   ORDERED BY: TAMMI CHRISTIE     ACC: [id number] EXAM:  MR SPINE CERVICAL   ORDERED BY: TAMMI CHRISTIE     PROCEDURE DATE:  04/11/2024          INTERPRETATION:  Three examinations were performed in this patient:  1.  MR of the brain without gadolinium contrast  2.  MR cervical spine without gadolinium contrast  3.  MR thoracic spine without gadolinium contrast    CLINICAL INFORMATION: Post fall trauma cord compression  HMR  Admitting   Dxs: S72.009A FRACTURE OF UNSP PART OF NECK OF UNSP FEMUR, INIT    TECHNIQUE:  1. MR brain:  Sagittal and axial T1-weighted images, axial FLAIR images,   axial susceptibility weighted images, axial T2-weighted images and axial   diffusion weighted images of the brain were obtained.  2.  MR cervical spine:   Sagittal T2-weighted images were obtained.  3.  MR thoracic spine:   Sagittal T2-weighted images were obtained.  The remainder these examinations could not be completed, not tolerated by   the patient. The images that could be obtained are degraded by patient   motion.  CONTRAST:    None    COMPARISON:   CT head 4/10/2023 and CT cervical and CT chest 4/9/2023    FINDINGS:    BRAIN    BRAIN:   The brain demonstrates small focal lesions of remote deep   infarction. The most conspicuous are noted in the external capsules. No   cerebral cortical lesion is recognized.   No diffusion restriction is   found in the brain.  No acute cerebral cortical infarct is found.   No   intracranial hemorrhage is recognized.  No mass effect is found in the   brain.   The brain also demonstrates patchy indistinct lesions within the   deep cerebral hemispheric white matter typical for ischemic white matter   disease.    CSF SPACES:   The ventricles, sulci and basal cisterns appear moderately   dilated reflecting diffuse brain volume loss.    VESSELS:   The vertebral and internal carotid arteries demonstrate   expected flow voids indicating their patency.    HEAD AND NECK STRUCTURES:   The orbits are unremarkable.  Paranasal   sinuses are clear.  The nasal cavity demonstrates left-to-right septal   deviation. Left middle turbinate tristen bullosa is present..  The central   skull base appears intact.  The nasopharynx is symmetric.  The temporal   bones appear clear of disease.  The calvarium appears unremarkable.    CERVICAL SPINE    Cervical vertebral alignment is significant for grade 1 anterior   listhesis C4 on C5, also slight retrolisthesis C5 on C6.   Facet joints   appear aligned.   Cervical vertebral body heights are maintained.   Cervical vertebral marrow signal intensity mildly heterogeneous from   degenerative endplate changes. These include small marginal osteophytes   most prominently at C5-C6.  No osseous expansion or epidural disease is   found.  No destructive bone lesion is found.   There are osteoarthritic   changes at the articulation of the anterior ring of C1 and the odontoid   process of C2.  This arthropathy includes marginal osteophytes,   subchondral sclerosis and joint space narrowing.    Cervical intervertebral disc spaces demonstrate a generalized pattern of   degeneration characterized by signal intensity loss on the long TR   images. Degenerative disc heightloss appears greatest at C5-C6.   Evaluation of degenerative disc disease is limited in the absence of   axial images.   However, no degenerative high-grade central canal   compromise is recognized.  Neural foramina appear narrowed by disc   materialand uncovertebral joint osteophytes most prominently at the   lower cervical intervertebral disc levels..    Cervical cord morphology Is preserved.  The cervical cord maintains   intact signal intensity   No focal intrinsic cord lesion is identified.   No cord expansion or cord volume loss is recognized.    The visualized adjacent neck structures appear intact.  No neck mass is   recognized.  Paraspinal soft tissues appear intact.  Visualized lymph   nodes appear to be within physiologic size limits.    THORACIC SPINE    Thoracic vertebral alignment demonstrates exaggeration of the mid   thoracic kyphosis. This vertebral malalignment has an apex at the T7   inferior endplate compression fracture. Approximately 70% height loss   results at this level. More limited compression fractures involve the T6   inferior endplate and the T9 superior endplate. Schmorl's nodes result.   More limited superior endplate deformity of T5 and T11. The remaining   thoracic vertebral body heights are maintained. Superior endplate   deformities are also present at L1-L2 and L4. At the L2 vertebral body   burst fracture pattern is present with impression upon the ventral thecal   sac but does not appear to compromise the cauda equina. At the thoracic   vertebral fracture is no high-grade central canal compromise is   recognized. Thoracic vertebral marrow signal intensity relatively   homogeneous without recognition of significant marrow edema.  No   destructive bone lesion, osseous expansion or epidural disease is found.    The paraspinal soft tissues appear intact.    Thoracic intervertebral disc spaces demonstrate a generalized pattern of   at least mild degeneration with signal intensity loss on the long TR   images. Disc heights are largely maintained.   No high-grade degenerative   central canal compromise is recognized. Thoracic neural foramina appear   intact.    The thoracic cord morphology is preserved.  The thoracic cord maintains   intact signal intensity.    IMPRESSION:    1. BRAIN:   Basal ganglia lacunar infarctions. Ischemic white matter   disease and atrophy typical for age    2. CERVICAL SPINE:   Grade 1 anterolisthesis C4 on C5. Low-grade cervical   degenerative disc disease. Cervical cord appears intact.    3. THORACIC SPINE:   Exaggeration in mid thoracic kyphosis with multiple   thoracic endplate deformities that are age indeterminate but felt likely   to be long-standing. Thoracic cord appears intact.    4. Only limited incomplete examinations could be performed on this date.   Recommend completion scans once tolerated by the patient.    --- End of Report ---    GRAZYNA CRAWLEY MD; Attending Radiologist  This document has been electronically signed. Apr 11 2024  8:02PM  ACC: [id number] EXAM:  XR FEMUR 2 VIEWS LT   ORDERED BY: SAMEER KLEIN     PROCEDURE DATE:  04/09/2024      INTERPRETATION:  Frontal and lateral views of the left femur were   obtained for history of fracture.    Comparison is made to a CT scan of the pelvis on the same day.    The bones are diffusely demineralized. There is a fracture along the   intertrochanteric line of the proximal left femur. The distal segment is   slightly upwardly displaced by less than one half bone width. There is no   dislocation of the left hip which in itself is grossly intact. There is   chronic degenerative narrowing of the joint spaces of the left knee. No   other fractures are seen.    IMPRESSION:  1. Diffuse bony demineralization associated with a fracture along the   intertrochanteric line of the proximal left femur, with only slight   upward migration of the distal segment, but less than one half bone width.  2. Degenerative arthropathy of the left knee.    --- End of Report ---    JORDI NGUYEN MD; Attending Radiologist  This document has been electronically signed. Apr 10 2024  8:16AM    ORT Score -   Family Hx of substance abuse	Female	      Male  Alcohol 	                                           1                     3  Illegal drugs	                                   2                     3  Rx drugs                                           4 	                  4  Personal Hx of substance abuse		  Alcohol 	                                          3	                  3  Illegal drugs                                     4	                  4  Rx drugs                                            5 	                  5  Age between 16- 45 years	           1                     1  hx preadolescent sexual abuse	   3 	                  0  Psychological disease		  ADD, OCD, bipolar, schizophrenia   2	          2  Depression                                           1 	          1  Total: 5    a score of 3 or lower indicates low risk for opioid abuse		  a score of 4-7 indicates moderate risk for opioid abuse		  a score of 8 or higher indicates high risk for opioid abuse  	  REVIEW OF SYSTEMS:  CONSTITUTIONAL: No fever or fatigue  HEENT:  No difficulty hearing, no change in vision  NECK: No pain or stiffness  RESPIRATORY: No cough, wheezing, chills or hemoptysis; No shortness of breath  CARDIOVASCULAR: No chest pain, palpitations, dizziness, or leg swelling  GASTROINTESTINAL: No loss of appetite, decreased PO intake. No abdominal or epigastric pain. No nausea, vomiting; No diarrhea or constipation.   GENITOURINARY: No dysuria, frequency, hematuria, retention, + incontinence  MUSCULOSKELETAL: +backpain; +left hip pain, + falls   NEURO: No headaches, No numbness/tingling b/l LE, No weakness  ENDOCRINE: No polyuria, polydipsia, heat or cold intolerance; No hair loss  PSYCHIATRIC: No depression, Hx anxiety, no difficulty sleeping    PHYSICAL EXAM:  GENERAL:  Alert & Oriented X4, cooperative, NAD, Good concentration. Speech is clear.   RESPIRATORY: Respirations even and unlabored. Clear to auscultation bilaterally; No rales, rhonchi, wheezing, or rubs, on 2L NC  CARDIOVASCULAR: Normal S1/S2, regular rate and rhythm; No murmurs, rubs, or gallops. No JVD.   GASTROINTESTINAL:  Soft, Nontender, Nondistended; Bowel sounds present  PERIPHERAL VASCULAR:  Extremities warm without edema. 2+ Peripheral Pulses, No cyanosis, No calf tenderness  MUSCULOSKELETAL: Motor Strength 5/5 B/L upper and 3/5 lower extremities; moves all extremities equally against gravity; ROM decreased to LLE; + tenderness on palpation of left hip and lowerback.  SKIN: Warm, dry, intact. No rashes, lesions, scars or wounds.     Risk factors associated with adverse outcomes related to opioid treatment  [ ]  Concurrent benzodiazepine use  [ ]  History/ Active substance use or alcohol use disorder  [x] Psychiatric co-morbidity  [ ] Sleep apnea  [x] COPD  [ ] BMI> 35  [ ] Liver dysfunction  [ ] Renal dysfunction  [ ] CHF  [x] Smoker  [x]  Age > 60 years    [x]  [place]  Reviewed and Copied to Chart. See below.    Plan of care and goal oriented pain management treatment options were discussed with patient and /or primary care giver; all questions and concerns were addressed and care was aligned with patient's wishes.    Educated patient on goal oriented pain management treatment options

## 2024-04-11 NOTE — PROGRESS NOTE ADULT - PROBLEM SELECTOR PLAN 4
- p/w sodium 130 -- downtrending  - Na 127 today -- no mental status change  - likely due to poor oral intake  - start NS  - nephro Dr Lopez consulted

## 2024-04-11 NOTE — PROGRESS NOTE ADULT - SUBJECTIVE AND OBJECTIVE BOX
Patient is seen and examined at the bed side, is afebrile. She mentioned feeling little stronger today. The Family at the bed side.      REVIEW OF SYSTEMS: All other review systems are negative      ALLERGIES: No Known Allergies      Vital Signs Last 24 Hrs  T(C): 36.6 (11 Apr 2024 13:37), Max: 37.1 (10 Apr 2024 20:45)  T(F): 97.9 (11 Apr 2024 13:37), Max: 98.8 (10 Apr 2024 20:45)  HR: 85 (11 Apr 2024 13:37) (83 - 96)  BP: 167/91 (11 Apr 2024 13:37) (150/85 - 183/98)  BP(mean): 118 (11 Apr 2024 06:47) (118 - 118)  RR: 18 (11 Apr 2024 13:37) (18 - 18)  SpO2: 96% (11 Apr 2024 13:37) (94% - 96%)    Parameters below as of 11 Apr 2024 13:37  Patient On (Oxygen Delivery Method): nasal cannula  O2 Flow (L/min): 2      PHYSICAL EXAM:  GENERAL: Not in distress, on oxygen via NC  CHEST/LUNG: Not using accessory muscles   HEART: s1 and s2 present  ABDOMEN:  Nontender and  Nondistended  EXTREMITIES: No pedal  edema  CNS: Awake and Alert      LABS:                        11.9   8.43  )-----------( 187      ( 11 Apr 2024 06:56 )             35.0                           12.9   8.29  )-----------( 190      ( 10 Apr 2024 05:10 )             38.2         04-11    130<L>  |  96  |  9   ----------------------------<  143<H>  3.5   |  32<H>  |  0.46<L>    Ca    8.5      11 Apr 2024 19:38  Phos  3.4     04-10  Mg     1.7     04-10    TPro  6.0  /  Alb  x   /  TBili  x   /  DBili  x   /  AST  x   /  ALT  x   /  AlkPhos  x   04-11    04-10    129<L>  |  90<L>  |  11  ----------------------------<  133<H>  3.5   |  32<H>  |  0.43<L>    Ca    8.6      10 Apr 2024 05:10  Phos  3.4     04-10  Mg     1.7     04-10    TPro  6.9  /  Alb  3.0<L>  /  TBili  0.9  /  DBili  x   /  AST  28  /  ALT  24  /  AlkPhos  91  04-10    PT/INR - ( 10 Apr 2024 05:10 )   PT: 10.9 sec;   INR: 0.95 ratio         PTT - ( 10 Apr 2024 05:10 )  PTT:29.3 sec      MEDICATIONS  (STANDING):  acetaminophen     Tablet .. 1000 milliGRAM(s) Oral every 8 hours  albuterol/ipratropium for Nebulization 3 milliLiter(s) Nebulizer every 6 hours  budesonide 160 MICROgram(s)/formoterol 4.5 MICROgram(s) Inhaler 2 Puff(s) Inhalation two times a day  chlorhexidine 2% Cloths 1 Application(s) Topical <User Schedule>  gabapentin 100 milliGRAM(s) Oral every 8 hours  heparin   Injectable 5000 Unit(s) SubCutaneous every 8 hours  levothyroxine 112 MICROGram(s) Oral daily  lidocaine   4% Patch 1 Patch Transdermal daily  lidocaine   4% Patch 2 Patch Transdermal daily  losartan 25 milliGRAM(s) Oral daily  naloxone Injectable 0.4 milliGRAM(s) IV Push once  nicotine -   7 mG/24Hr(s) Patch 1 Patch Transdermal daily  piperacillin/tazobactam IVPB.. 3.375 Gram(s) IV Intermittent every 8 hours  polyethylene glycol 3350 17 Gram(s) Oral daily  predniSONE   Tablet 40 milliGRAM(s) Oral daily  senna 2 Tablet(s) Oral at bedtime        RADIOLOGY & ADDITIONAL TESTS:      4/9/24 : CT Chest No Cont (04.09.24 @ 17:06) Axial CT images of the chest are obtained without intravenous   administration of contrast.      IMPRESSION: Loss of height of multiple thoracic and lumbar spine vertebral bodies which are of indeterminate age. Clinical correlation for   back pain is recommended. Dedicated spinal MRI can be performed for complete evaluation.    Dominant cluster of nodular and patchy opacities within the superior segment of the right lower lobe suggestive of infection with   endobronchial spread. A 1-3 month follow-up noncontrast chest CT is recommended to ensure improvement/resolution.    Emphysema.    Intrahepatic and extrahepatic biliary ductal dilatation is of unclear etiology. Upper abdominal ultrasound can be performedfor further   evaluation as clinically warranted.      4/10/24: CT Head No Cont (04.10.24 @ 15:55) IMPRESSION: Age-appropriate involutional and ischemic gliotic changes. No   hemorrhage. No change since 4/9/2024.      4/9/24 : Xray Femur 2 Views, Left (04.09.24 @ 20:35) 1. Diffuse bony demineralization associated with a fracture along the   intertrochanteric line of the proximal left femur, with only slight upward migration of the distal segment, but less than one half bone width.  2. Degenerative arthropathy of the left knee.    4/9/24: Xray Pelvis AP only (04.09.24 @ 20:35) 1. Diffuse bony demineralization with a fracture along the   intertrochanteric line of the proximal left femur. No dislocation of the hips.        MICROBIOLOGY DATA:    Legionella pneumophila Antigen, Urine (04.10.24 @ 05:40)   Legionella Antigen, Urine: Negative:      Respiratory Viral Panel with COVID-19 by SHAHANA (04.09.24 @ 21:24)   Rapid RVP Result: NotDete  SARS-CoV-2: NotDetec:

## 2024-04-11 NOTE — PROGRESS NOTE ADULT - PROBLEM SELECTOR PLAN 3
plan as above Have Your Spot(S) Been Treated In The Past?: has not been treated Hpi Title: Evaluation of Skin Lesions Year Removed: 1900 - in exacerbation likely due to PNA  - as seen in CT chest  - plan as above  - continue nasal cannula to maintain SPO2 >90%  - continue zosyn  - ID Dr Salinas following  - Pulm Dr Villatoro following

## 2024-04-11 NOTE — PROGRESS NOTE ADULT - PROBLEM SELECTOR PLAN 10
pt is current smoker  - smokes 3 cigarettes a day, with hx of 60 + ppy  - c/w nicotine patches  - pt was counseled on smoking cessation

## 2024-04-11 NOTE — PROGRESS NOTE ADULT - PROBLEM SELECTOR PLAN 2
hx of COPD not on home O2, and current smoker  - p/w SOB likely due to PNA  - pt is hypoxic on RA to 80s  - continue nasal cannula to maintain SPO2 >90%  - CT chest shows Loss of height of multiple thoracic and lumbar spine vertebral bodies which are of indeterminate age. Dominant cluster of nodular and patchy opacities within the superior segment of the right lower lobe suggestive of infection with endobronchial spread. Emphysema.  - continue albuterol PRN, duonebs, prednisone 40 mg qd  - pulm Dr Villatoro consulted

## 2024-04-11 NOTE — PROGRESS NOTE ADULT - PROBLEM SELECTOR PLAN 2
hx of COPD not on home O2, and current smoker  - p/w SOB likely due to PNA  - pt is hypoxic on RA to 80s  - continue nasal cannula to maintain SPO2 >90%  - CT chest shows Loss of height of multiple thoracic and lumbar spine vertebral bodies which are of indeterminate age. Dominant cluster of nodular and patchy opacities within the superior segment of the right lower lobe suggestive of infection with endobronchial spread. Emphysema.  tylenol prn  start albuterol, duonebs, prednisone 40 mg qd, and symbicort hx of COPD not on home O2, and current smoker  - p/w SOB likely due to PNA  - pt is hypoxic on RA to 80s  - continue nasal cannula to maintain SPO2 >90%  - CT chest shows Loss of height of multiple thoracic and lumbar spine vertebral bodies which are of indeterminate age. Dominant cluster of nodular and patchy opacities within the superior segment of the right lower lobe suggestive of infection with endobronchial spread. Emphysema.  - continue albuterol PRN, duonebs, prednisone 40 mg qd  - pulm Dr Villatoro consulted Arava Counseling:  Patient counseled regarding adverse effects of Arava including but not limited to nausea, vomiting, abnormalities in liver function tests. Patients may develop mouth sores, rash, diarrhea, and abnormalities in blood counts. The patient understands that monitoring is required including LFTs and blood counts.  There is a rare possibility of scarring of the liver and lung problems that can occur when taking methotrexate. Persistent nausea, loss of appetite, pale stools, dark urine, cough, and shortness of breath should be reported immediately. Patient advised to discontinue Arava treatment and consult with a physician prior to attempting conception. The patient will have to undergo a treatment to eliminate Arava from the body prior to conception.

## 2024-04-11 NOTE — PROGRESS NOTE ADULT - PROBLEM SELECTOR PLAN 3
- in exacerbation likely due to PNA  - as seen in CT chest  - plan as above  - continue nasal cannula to maintain SPO2 >90%  - continue zosyn  - ID Dr Salinas following  - Pulm Dr Villatoro following

## 2024-04-11 NOTE — CONSULT NOTE ADULT - NS ATTEND AMEND GEN_ALL_CORE FT
Impression: This is a 72 Y/O Female with s/p multiple Falls at Home and sustained Left Hip fracture . Presented with complains of episode of SOB , hypoxia in ED with o2 saturation 80s room air due to Acute hypoxic respiratory failure secondary to combination of Right lung Pneumonia showing on CT chest   and COPD exacerbation , Active smoker . Negative swab for RVP, Covid. Negative UA for legionella .     Suggestion:  O2 saturation 96% with O2 supplement. Continue Oxygen supplementation 2L NC .  Continue Duoneb via nebulization Q 6 hours . Do not give at the same time with PRN Albuterol 90 mcg 2 puffs Q 6 Hours.   Continue Symbicort 160- 4.5 mcg 2 puffs Twice daily. using Wixela Twice daily outpatient .   On Zosyn 3.375 Gm IVPB Q 8 Hours .  Continue Prednisone 40 mg Oral daily x 5 days then taper to 20 mg Oral Daily x 5 days.   Orthopedic following .   Reinforced the importance of smoking cessation , has Rt arm nicotine patch on x 12 hours.

## 2024-04-11 NOTE — PROGRESS NOTE ADULT - PROBLEM SELECTOR PLAN 9
History of anxiety  takes xanax 0.5 mg qd at home  c/w home meds hx of spinal stenosis takes morphine and percocet at home  - c/w pain regimen  - pain mgt team following

## 2024-04-11 NOTE — PROGRESS NOTE ADULT - PROBLEM SELECTOR PLAN 6
pt states that she lost about 30 lbs in the last 2 months  - She is undergoing colonoscopy o/p to evaluate for weight loss, family history of colon ca in her father  - tumor markers neg  - CT chest showed: Dominant cluster of nodular and patchy opacities within the superior segment of the right lower lobe suggestive of infection with endobronchial spread  - F/U CT A/P non con -- pt is allergic to contrast dye  - hem/onc Dr Emerson following

## 2024-04-11 NOTE — CONSULT NOTE ADULT - ASSESSMENT
Patient is a 74yo Female with HTN on Losartan, active smoker with COPD ( not on O2 at home), hypothyroidism, spinal stenosis, urinary incontinence, p/w left hip and back pain s/p fall 2 weeks PTA a/w left hip fracture, SOB 2/2 PNA vs COPD exacerbation with hyponatremia and hypokalemia. Nephrology consulted for electrolyte abnormalities.     1. Hyponatremia- ?SIADH due to pain vs low solute intake. Repeat BMP stat. Check urine sodium, urine osmolality and serum osmolality. TSH 5.92; mild elevation; defer to primary team  Check AM Cortisol. Monitor serial BMP's and UO for overcorrection. Do not correct more than 8 meQ/24 hours. Monitor serum sodium.  2. Hypokalemia- Pt given KCL 10meq IV x3 doses and KCl 40 meq PO x1. Repeat BMP. Monitor serum potassium  3. Essential HTN- BP elevated. Pt with normal renal function. OK to resume Losartan 25mg PO daily, titrate as needed. Monitor BP  4. Acute left femoral fracture- avoid NSAIDs. Plan as per [facility] NEPHROLOGY  Natan Addison M.D.  Júnior Borrego D.O.  Laurence Lopez M.D.  MD Katherine Garner, MSN, ANP-C    Telephone: [phone]  Facsimile: [phone] [place] [address], [address]  [place] [id number]

## 2024-04-11 NOTE — PROGRESS NOTE ADULT - PROBLEM SELECTOR PLAN 6
hx of hypothyroidism  takes levothyroxine 112 mcg qd  c/w home meds  f/u TSH pt states that she lost about 30 lbs in the last 2 months  - She is undergoing colonoscopy o/p to evaluate for weight loss, family history of colon ca in her father  - tumor markers neg  - CT chest showed: Dominant cluster of nodular and patchy opacities within the superior segment of the right lower lobe suggestive of infection with endobronchial spread  - F/U CT A/P non con -- pt is allergic to contrast dye  - hem/onc Dr Emerson following

## 2024-04-11 NOTE — PROGRESS NOTE ADULT - ASSESSMENT
73-year-old female from home ambulates with a walker at baseline with PMH of HTN, COPD ( not on O2 at home), current smoker with 60 ppy hx, hypothyroidism, spinal stenosis urinary incontinence, presenting with left-sided lower back and hip pain after fall 2 weeks ago found to have left femoral fracture. Pt is admitted for L hip fracture and COPD exacerbation 2/2 PNA.

## 2024-04-11 NOTE — PROGRESS NOTE ADULT - SUBJECTIVE AND OBJECTIVE BOX
Patient is a 73y old  Female who presents with a chief complaint of L hip fracture   4/11/24      INTERVAL HPI/OVERNIGHT EVENTS:  73-year-old female from home ambulates with a walker at baseline with PMHx of HTN, COPD (not on O2 at home), current smoker with 60 ppy hx, hypothyroidism, spinal stenosis, urinary incontinence, presenting with left-sided lower back and hip pain after fall 2 weeks ago. CTH and cervical spine shows No gross acute intracranial hemorrhage. CT head no acute intracranial hemorrhage. CT pelvis shows Acute proximal left femoral fracture. Pt is admitted for L hip fracture and COPD exacerbation 2/2 PNA.  Ortho following, recommending left hip IM nailing. Pt is not agreeable for the procedure at this time, will re-visit this discussion as per ortho. Also, there was a concern for slurred speech and possible stroke. Neuro following, recommending adequate imaging evaluation of her spine, especially the cervical spine, should precede operation (precede getting intubated for respiratory support). As per pt and family, she is allergic to contrast dye (unable to recall the reaction) hence CT scan with contrast is unable to perform. Pt recently had MR of lumbar spine non con (3 weeks ago). At this time, we will proceed with MR head C/T-spine non contrast as per neuro recs.  Nephro consulted for hyponatremia and pulmonology for COPD exacerbation    Spoke with daughter Sallie,  and patient in lengthy, updated about the plan of care, answered all questions. Pt is complaining of generalized pain, pain mgt following    MEDICATIONS  (STANDING):  acetaminophen     Tablet .. 1000 milliGRAM(s) Oral every 8 hours  albuterol/ipratropium for Nebulization 3 milliLiter(s) Nebulizer every 6 hours  budesonide 160 MICROgram(s)/formoterol 4.5 MICROgram(s) Inhaler 2 Puff(s) Inhalation two times a day  chlorhexidine 2% Cloths 1 Application(s) Topical <User Schedule>  gabapentin 100 milliGRAM(s) Oral every 8 hours  heparin   Injectable 5000 Unit(s) SubCutaneous every 8 hours  levothyroxine 112 MICROGram(s) Oral daily  lidocaine   4% Patch 1 Patch Transdermal daily  lidocaine   4% Patch 2 Patch Transdermal daily  losartan 25 milliGRAM(s) Oral daily  naloxone Injectable 0.4 milliGRAM(s) IV Push once  nicotine -   7 mG/24Hr(s) Patch 1 Patch Transdermal daily  piperacillin/tazobactam IVPB.. 3.375 Gram(s) IV Intermittent every 8 hours  polyethylene glycol 3350 17 Gram(s) Oral daily  predniSONE   Tablet 40 milliGRAM(s) Oral daily  senna 2 Tablet(s) Oral at bedtime    MEDICATIONS  (PRN):  albuterol    90 MICROgram(s) HFA Inhaler 2 Puff(s) Inhalation every 6 hours PRN Bronchospasm  ALPRAZolam 1 milliGRAM(s) Oral every 12 hours PRN Anxiety  benzonatate 100 milliGRAM(s) Oral every 8 hours PRN Cough  bisacodyl 5 milliGRAM(s) Oral daily PRN Constipation  cyclobenzaprine 5 milliGRAM(s) Oral every 8 hours PRN Spasm  oxyCODONE    IR 10 milliGRAM(s) Oral every 4 hours PRN Severe Pain (7 - 10)  oxyCODONE    IR 5 milliGRAM(s) Oral every 4 hours PRN Moderate Pain (4 - 6)      __________________________________________________  REVIEW OF SYSTEMS:  States "not too good"  CONSTITUTIONAL: No fever,   EYES: no acute visual disturbances  NECK: + generalized pain  RESPIRATORY: No cough; No shortness of breath  CARDIOVASCULAR: No chest pain, no palpitations  GASTROINTESTINAL: No pain. No nausea or vomiting; No diarrhea   NEUROLOGICAL: No headache or numbness, no tremors  MUSCULOSKELETAL: + generalized pain  GENITOURINARY: no dysuria, no frequency, no hesitancy  ALL OTHER  ROS negative        Vital Signs Last 24 Hrs  T(C): 36.9 (11 Apr 2024 04:49), Max: 37.3 (10 Apr 2024 14:24)  T(F): 98.4 (11 Apr 2024 04:49), Max: 99.1 (10 Apr 2024 14:24)  HR: 83 (11 Apr 2024 04:49) (83 - 96)  BP: 161/96 (11 Apr 2024 06:47) (147/88 - 183/98)  BP(mean): 118 (11 Apr 2024 06:47) (118 - 118)  RR: 18 (11 Apr 2024 04:49) (17 - 18)  SpO2: 94% (11 Apr 2024 04:49) (94% - 98%)    Parameters below as of 11 Apr 2024 04:49  Patient On (Oxygen Delivery Method): nasal cannula  O2 Flow (L/min): 2      ________________________________________________  PHYSICAL EXAM:  GENERAL: NAD, cachectic  HEENT: Normocephalic; conjunctivae and sclerae clear; moist mucous membranes;   NECK : supple  CHEST/LUNG: RLL rales   HEART: S1 S2  regular;  ABDOMEN: Soft, Nontender, distended; Bowel sounds present  EXTREMITIES: no cyanosis; no edema; no calf tenderness  SKIN: warm and dry; no rash  NERVOUS SYSTEM:  Awake and alert;  _________________________________________________  LABS:                        11.9   8.43  )-----------( 187      ( 11 Apr 2024 06:56 )             35.0     04-11    127<L>  |  90<L>  |  11  ----------------------------<  127<H>  2.9<LL>   |  31  |  0.40<L>    Ca    8.4      11 Apr 2024 06:56  Phos  3.4     04-10  Mg     1.7     04-10    TPro  6.0  /  Alb  x   /  TBili  x   /  DBili  x   /  AST  x   /  ALT  x   /  AlkPhos  x   04-11    PT/INR - ( 10 Apr 2024 05:10 )   PT: 10.9 sec;   INR: 0.95 ratio         PTT - ( 10 Apr 2024 05:10 )  PTT:29.3 sec  Urinalysis Basic - ( 11 Apr 2024 06:56 )    Color: x / Appearance: x / SG: x / pH: x  Gluc: 127 mg/dL / Ketone: x  / Bili: x / Urobili: x   Blood: x / Protein: x / Nitrite: x   Leuk Esterase: x / RBC: x / WBC x   Sq Epi: x / Non Sq Epi: x / Bacteria: x      CAPILLARY BLOOD GLUCOSE            RADIOLOGY & ADDITIONAL TESTS:  < from: CT Head No Cont (04.10.24 @ 15:55) >    ACC: [id number] EXAM:  CT BRAIN   ORDERED BY: ZACH RODRIGUEZ     PROCEDURE DATE:  04/10/2024          INTERPRETATION:  Clinical Indication: Follow-up trauma, altered mental   status, left hip fracture    5mm axial sections of the brain were obtained from base to vertex,   without the intravenous administration of contrast material. Coronal and   sagittal computer generated reconstructed views are available.    Comparison is made with prior CT of 4/9/2024 and demonstrates no   significant intervalchange.    The ventricles and sulci are prominent consistent age appropriate   involutional changes. Small vessel white matter ischemic changes are   noted. There is no hemorrhage, mass, or shift of midline structures. Bone   window examination is unremarkable.    IMPRESSION: Age-appropriate involutional and ischemic gliotic changes. No   hemorrhage. No change since 4/9/2024.    --- End of Report ---            JEFF SRIVASTAVA MD; Attending Radiologist  This document has been electronically signed.Apr 10 2024  4:49PM    < end of copied text >    Imaging Personally Reviewed:  YES/NO    Consultant(s) Notes Reviewed:   YES/ No    Care Discussed with Consultants :     Plan of care was discussed with patient and /or primary care giver; all questions and concerns were addressed and care was aligned with patient's wishes.

## 2024-04-11 NOTE — CONSULT NOTE ADULT - ASSESSMENT
Confidential Drug Utilization Report  Search Terms: ROXY VASQUEZ, 1950   The Drug Utilization Report below displays all of the controlled substance prescriptions, if any, that your patient has filled in the last twelve months. The information displayed on this report is compiled from pharmacy submissions to the Department, and accurately reflects the information as submitted by the pharmacies.    This report was requested by: Olya White | Reference #: [id number]    You have not added a TIM number. Keeping your TIM number(s) up to date on the My TIM # tab will enable the separation of your prescriptions from others in the search results.    Practitioner Count: 2  Pharmacy Count: 1  Current Opioid Prescriptions: 2  Current Benzodiazepine Prescriptions: 0  Current Stimulant Prescriptions: 0      Patient Demographic Information (PDI)       PDI	First Name	Last Name	Birth Date	Gender	Street Address	[facility]	Zip Code  OSVALDO Vasquez	1950	Female	[phone] [facility]	[id number]    Prescription Information      PDI Filter:    PDI	Current Rx	Drug Type	Rx Written	Rx Dispensed	Drug	Quantity	Days Supply	Prescriber Name	Prescriber TIM #  A	Y	O	03/27/2024	03/30/2024	oxycodone-acetaminophen  mg tab	150	30	Ranjith Garcias	[id number]  Payment Method Cash  Dispenser [facility]  A	Y	O	03/27/2024	03/28/2024	morphine sulf er 60 mg tablet	60	30	SheRanjith silver	[id number]  Payment Method Cash  Dispenser [facility]  A	N	B	03/27/2024	03/27/2024	alprazolam 1 mg tablet	60	15	SheRanjith silver	[id number]  Payment Method Insurance  Dispenser [facility]  A	N	O	02/26/2024	03/01/2024	oxycodone-acetaminophen  mg tablet	150	30	Ranjith Garcias	[id number]  Payment Method Abdi  Dispenser [facility]  A	N	B	02/26/2024	02/28/2024	alprazolam 1 mg tablet	60	15	Ranjith Garcias	[id number]  Payment Method Insurance  Dispenser [facility]  A	N	O	02/26/2024	02/28/2024	morphine sulf er 60 mg tablet	60	30	SheRanjith silver	[id number]  Payment Method Cash  Dispenser [facility]  A	N	B	01/29/2024	02/01/2024	alprazolam 1 mg tablet	60	30	SheflinRanjith	[id number]  Payment Method Insurance  [facility] #[facility]  A	N	O	01/29/2024	02/01/2024	oxycodone-acetaminophen  mg tab	150	30	Sheflin, Ranjith STEPHENIE	[id number]  Payment Method Cash  [facility] #1  A	N	B	01/02/2024	01/02/2024	alprazolam 1 mg tablet	60	30	Sheflin, Ranjith CASIANO	[id number]  Payment Method Insurance  [facility]  A	N	O	01/02/2024	01/02/2024	oxycodone-acetaminophen  mg tab	150	30	Sheflin, Ranjith CASIANO	[id number]  Payment Method Cash  [facility] #1  A	N	S	11/27/2023	11/29/2023	dextroamp-amphetamin 20 mg tab	60	30	Sheflin, Ranjith	[id number]  Payment Method Insurance  [facility] #1  A	N	O	11/27/2023	11/29/2023	morphine sulf er 60 mg tablet	60	30	Sheflin, Ranjith CASIANO	[id number]  Payment Method [facility] #1  A	N	O	11/27/2023	11/29/2023	oxycodone-acetaminophen  mg tab	150	30	Sheflin, Ranjith CASIANO	[id number]  Payment Method Insurance  [facility] #1  A	N	B	11/08/2023	11/27/2023	alprazolam 0.25 mg tablet	2	1	Benny Lundberg B, DO	[id number]  Payment Method Insurance  [facility] #1  A	N	B	11/27/2023	11/27/2023	alprazolam 1 mg tablet	60	15	SheflRanjith sebastian	[id number]  Payment Method Insurance  [facility] #1  A	N	B	10/30/2023	10/31/2023	alprazolam 1 mg tablet	60	15	Sheflin, Ranjith CASIANO	[id number]  Payment Method Insurance  [facility] #1  A	N	S	10/30/2023	10/31/2023	dextroamp-amphetamin 20 mg tab	60	30	Sheflin, Ranjith CASIANO	[id number]  Payment Method Insurance  Dispenser [facility] #[facility]  A	N	O	10/30/2023	10/31/2023	morphine sulf er 60 mg tablet	60	30	Sheflin, Ranjith CASIANO	[id number]  Payment Method Cash  Dispenser [facility] #[facility]  A	N	O	10/30/2023	10/31/2023	oxycodone-acetaminophen  mg tab	150	30	Sheflin, Ranjith CASIANO	[id number]  Payment Method Insurance  Dispenser [facility]  A	N	B	10/02/2023	10/02/2023	alprazolam 1 mg tablet	60	15	Sheflin, Ranjith	[id number]  Payment Method Insurance  Dispenser [facility]  A	N	O	10/02/2023	10/02/2023	oxycodone-acetaminophen  mg tab	150	30	Sheflin, Ranjith	[id number]  [facility]	N	O	09/01/2023	09/30/2023	oxycodone-acetaminophen  mg tab	150	30	Sheflin, Ranjith	[id number]  [facility]  [facility]  A	N	B	09/01/2023	09/30/2023	alprazolam 1 mg tablet	60	15	Sheflin, Ranjith CASIANO	[id number]  Payment Method Insurance  Dispenser [facility] #[facility]  A	N	O	09/01/2023	09/30/2023	morphine sulf er 60 mg tablet	60	30	Sheflin, Ranjith CASIANO	[id number]  [facility] Method Cash  [facility]  A	N	S	09/01/2023	09/30/2023	dextroamp-amphetamin 20 mg tab	60	30	Sheflin, Ranjith	[id number]  Payment Method Insurance  Dispenser [facility]  A	N	B	07/31/2023	08/04/2023	alprazolam 1 mg tablet	60	15	Sheflin, Ranjith	[id number]  Payment Method Insurance  Dispenser [facility] #[facility]  A	N	O	07/31/2023	08/04/2023	oxycodone-acetaminophen  mg tab	150	30	Sheflin, Ranjith	[id number]  Payment Method Insurance  [facility] #1  A	N	S	07/31/2023	08/01/2023	dextroamp-amphetamin 20 mg tab	60	30	Sheflin, Ranjith	[id number]  Payment Method Insurance  [facility] #1  A	N	O	07/31/2023	08/01/2023	morphine sulf er 60 mg tablet	60	30	Sheflin, Ranjith	[id number]  Payment Method Abdi  [facility]  A	N	B	07/01/2023	07/05/2023	alprazolam 1 mg tablet	60	15	Sheflin, Ranjith CASIANO	[id number]  Payment Method Insurance  [facility] #1  A	N	O	07/01/2023	07/05/2023	oxycodone-acetaminophen  mg tab	150	30	Sheflin, Ranjith CASIANO	[id number]  Payment Method Insurance  [facility] #1  A	N	S	05/05/2023	06/05/2023	dextroamp-amphetamin 20 mg tab	60	30	Sheflin, Ranjith	[id number]  Payment Method Insurance  [facility] #1  A	N	B	06/05/2023	06/05/2023	alprazolam 1 mg tablet	60	30	Sheflin, Ranjith	[id number]  Payment Method Insurance  [facility] #1  A	N	O	06/05/2023	06/05/2023	oxycodone-acetaminophen  mg tab	150	30	Sheflin, Ranjith	[id number]  Payment Method Insurance  [facility] #1  A	N	S	04/05/2023	05/05/2023	dextroamp-amphetamin 20 mg tab	60	30	Sheflin, Ranjith	[id number]  Payment Method Insurance  [facility] #[facility]  A	N	B	05/05/2023	05/05/2023	alprazolam 1 mg tablet	60	15	Sheflin, Ranjith	[id number]  Payment Method Insurance  [facility] #1  A	N	O	05/05/2023	05/05/2023	oxycodone-acetaminophen  mg tab	150	30	Sheflin, Ranjith CASIANO	[id number]  Payment Method Insurance  [facility] #1    * - Details of Drug Type : O = Opioid, B = Benzodiazepine, S = Stimulant    * - Drugs marked with an asterisk are compound drugs. If the compound drug is made up of more than one controlled substance, then each controlled substance will be a separate row in the table.

## 2024-04-11 NOTE — PROGRESS NOTE ADULT - PROBLEM SELECTOR PLAN 5
h/o HTN on losartan 25 mg qd  Monitor BP  c/w home meds  DASH diet - complains of slurred speech  - imaging negative for acute findings  - neuro Dr Keyes following -- MRs head, c-spine, t-spine. If possible wo AND w contrast  - pt is allergic to contrast dye  - will proceed with MRs once determined pt is not allergic to gadolinium. Pt recently had MR L-spine OP but unsure if with contrast - complains of slurred speech  - imaging negative for acute findings  - neuro Dr Keyes following -- MRs head, c-spine, t-spine. If possible wo AND w contrast  - pt is allergic to contrast dye  - will proceed with MRs non con due to unable to determine if pt is allergic to contrast iodine dye or Gadolinium. Pt recently had MR L-spine non con OP

## 2024-04-11 NOTE — PROGRESS NOTE ADULT - ASSESSMENT
Patient is a 73y old  Female who is from home ambulates with a walker at baseline with PMH of HTN, COPD ( not on O2 at home), current smoker with 60 ppy hx, hypothyroidism, spinal stenosis urinary incontinence, now presents to the ER for evaluation of left-sided lower back and hip pain after fall 2 weeks ago. Approximately 1-2 weeks ago the patient fell while walking and her leg "gave out" and since then she has had left-sided leg pain.  Patient reports she is able to walk with her cane/walker but feels very weak and unbalanced and has since fallen 2 more times. Pt also complains of SOB and chest pain since the fall today after her cane hit her chest.  On admission, she has no fever but found to be hypoxic on RA to 80s saturating well on 1-2 L NC. The CT chest shows Dominant cluster of nodular and patchy opacities within the superior segment of the right lower lobe suggestive of infection with endobronchial spread. Emphysema. Intrahepatic and extrahepatic biliary ductal dilatation is of unclear etiology. CT pelvis shows Acute proximal left femoral fracture,  Age-indeterminate severe compression fracture of the L5 vertebral body. Pt is admitted for L hip fracture/ SOB 2/2 PNA vs COPD exacerbation. She has started on Zosyn and the ID consult requested to assist with further evaluation and antibiotic management.    # Pneumonia - Legionella urine Ag is negative  # Left femoral fracture, s/p Fall- Surgery on 4/11 as per Ortho, but was not done since patient is not optimized yet    would recommend :    1. Follow up MRSA PCR, is in process  2. Aspiration precaution  3. Supplemental oxygenation and Bronchodilator as needed  4. Continue Zosyn for now  5. Pain management as neede    d/w family at the bed side     Attending Attestation:    Spent more than 45 minutes on total encounter, more than 50 % of the visit was spent counseling and/or coordinating care by the Attending physician.

## 2024-04-11 NOTE — PROGRESS NOTE ADULT - PROBLEM SELECTOR PLAN 7
hx of spinal stenosis takes morphine and percocet at home  c/w pain management as indicated above h/o HTN on losartan 25 mg qd  - continue home med losartan

## 2024-04-11 NOTE — PROGRESS NOTE ADULT - PROBLEM SELECTOR PLAN 1
- s/p fall x 3 since easter   - CT pelvis shows Acute proximal left femoral fracture  - ortho following -- recommending left hip IM nailing  - pt is not optimized at this time

## 2024-04-12 DIAGNOSIS — M25.552 PAIN IN LEFT HIP: ICD-10-CM

## 2024-04-12 LAB
ALBUMIN SERPL ELPH-MCNC: 2.6 G/DL — LOW (ref 3.5–5)
ALP SERPL-CCNC: 78 U/L — SIGNIFICANT CHANGE UP (ref 40–120)
ALT FLD-CCNC: 19 U/L DA — SIGNIFICANT CHANGE UP (ref 10–60)
ANION GAP SERPL CALC-SCNC: 6 MMOL/L — SIGNIFICANT CHANGE UP (ref 5–17)
AST SERPL-CCNC: 21 U/L — SIGNIFICANT CHANGE UP (ref 10–40)
BASOPHILS # BLD AUTO: 0.03 K/UL — SIGNIFICANT CHANGE UP (ref 0–0.2)
BASOPHILS NFR BLD AUTO: 0.4 % — SIGNIFICANT CHANGE UP (ref 0–2)
BILIRUB SERPL-MCNC: 0.5 MG/DL — SIGNIFICANT CHANGE UP (ref 0.2–1.2)
BUN SERPL-MCNC: 8 MG/DL — SIGNIFICANT CHANGE UP (ref 7–18)
CALCIUM SERPL-MCNC: 8.1 MG/DL — LOW (ref 8.4–10.5)
CCP IGG SERPL-ACNC: <8 UNITS — SIGNIFICANT CHANGE UP
CHLORIDE SERPL-SCNC: 100 MMOL/L — SIGNIFICANT CHANGE UP (ref 96–108)
CO2 SERPL-SCNC: 31 MMOL/L — SIGNIFICANT CHANGE UP (ref 22–31)
CREAT SERPL-MCNC: 0.4 MG/DL — LOW (ref 0.5–1.3)
EGFR: 104 ML/MIN/1.73M2 — SIGNIFICANT CHANGE UP
EOSINOPHIL # BLD AUTO: 0.18 K/UL — SIGNIFICANT CHANGE UP (ref 0–0.5)
EOSINOPHIL NFR BLD AUTO: 2.1 % — SIGNIFICANT CHANGE UP (ref 0–6)
GLUCOSE SERPL-MCNC: 140 MG/DL — HIGH (ref 70–99)
HCT VFR BLD CALC: 34.9 % — SIGNIFICANT CHANGE UP (ref 34.5–45)
HGB BLD-MCNC: 11.6 G/DL — SIGNIFICANT CHANGE UP (ref 11.5–15.5)
IMM GRANULOCYTES NFR BLD AUTO: 0.4 % — SIGNIFICANT CHANGE UP (ref 0–0.9)
LYMPHOCYTES # BLD AUTO: 1.42 K/UL — SIGNIFICANT CHANGE UP (ref 1–3.3)
LYMPHOCYTES # BLD AUTO: 16.9 % — SIGNIFICANT CHANGE UP (ref 13–44)
MAGNESIUM SERPL-MCNC: 2 MG/DL — SIGNIFICANT CHANGE UP (ref 1.6–2.6)
MCHC RBC-ENTMCNC: 31.7 PG — SIGNIFICANT CHANGE UP (ref 27–34)
MCHC RBC-ENTMCNC: 33.2 GM/DL — SIGNIFICANT CHANGE UP (ref 32–36)
MCV RBC AUTO: 95.4 FL — SIGNIFICANT CHANGE UP (ref 80–100)
MONOCYTES # BLD AUTO: 1.06 K/UL — HIGH (ref 0–0.9)
MONOCYTES NFR BLD AUTO: 12.6 % — SIGNIFICANT CHANGE UP (ref 2–14)
MRSA PCR RESULT.: SIGNIFICANT CHANGE UP
NEUTROPHILS # BLD AUTO: 5.7 K/UL — SIGNIFICANT CHANGE UP (ref 1.8–7.4)
NEUTROPHILS NFR BLD AUTO: 67.6 % — SIGNIFICANT CHANGE UP (ref 43–77)
NRBC # BLD: 0 /100 WBCS — SIGNIFICANT CHANGE UP (ref 0–0)
OSMOLALITY SERPL: 283 MOSMOL/KG — SIGNIFICANT CHANGE UP (ref 280–301)
OSMOLALITY UR: 279 MOS/KG — SIGNIFICANT CHANGE UP (ref 50–1200)
PHOSPHATE SERPL-MCNC: 2.5 MG/DL — SIGNIFICANT CHANGE UP (ref 2.5–4.5)
PLATELET # BLD AUTO: 188 K/UL — SIGNIFICANT CHANGE UP (ref 150–400)
POTASSIUM SERPL-MCNC: 3.1 MMOL/L — LOW (ref 3.5–5.3)
POTASSIUM SERPL-SCNC: 3.1 MMOL/L — LOW (ref 3.5–5.3)
PROT SERPL-MCNC: 5.9 G/DL — LOW (ref 6–8.3)
RBC # BLD: 3.66 M/UL — LOW (ref 3.8–5.2)
RBC # FLD: 12.8 % — SIGNIFICANT CHANGE UP (ref 10.3–14.5)
RF+CCP IGG SER-IMP: NEGATIVE — SIGNIFICANT CHANGE UP
S AUREUS DNA NOSE QL NAA+PROBE: SIGNIFICANT CHANGE UP
SODIUM SERPL-SCNC: 137 MMOL/L — SIGNIFICANT CHANGE UP (ref 135–145)
T3FREE SERPL-MCNC: 1.26 PG/ML — LOW (ref 2–4.4)
URATE SERPL-MCNC: 2.4 MG/DL — LOW (ref 2.5–7)
UUN UR-MCNC: 617 MG/DL — SIGNIFICANT CHANGE UP
WBC # BLD: 8.42 K/UL — SIGNIFICANT CHANGE UP (ref 3.8–10.5)
WBC # FLD AUTO: 8.42 K/UL — SIGNIFICANT CHANGE UP (ref 3.8–10.5)

## 2024-04-12 PROCEDURE — 99233 SBSQ HOSP IP/OBS HIGH 50: CPT

## 2024-04-12 RX ORDER — BUDESONIDE AND FORMOTEROL FUMARATE DIHYDRATE 160; 4.5 UG/1; UG/1
2 AEROSOL RESPIRATORY (INHALATION)
Refills: 0 | Status: DISCONTINUED | OUTPATIENT
Start: 2024-04-12 | End: 2024-04-16

## 2024-04-12 RX ORDER — POTASSIUM CHLORIDE 20 MEQ
40 PACKET (EA) ORAL ONCE
Refills: 0 | Status: COMPLETED | OUTPATIENT
Start: 2024-04-12 | End: 2024-04-12

## 2024-04-12 RX ADMIN — OXYCODONE HYDROCHLORIDE 5 MILLIGRAM(S): 5 TABLET ORAL at 01:06

## 2024-04-12 RX ADMIN — Medication 1000 MILLIGRAM(S): at 06:25

## 2024-04-12 RX ADMIN — Medication 1000 MILLIGRAM(S): at 14:33

## 2024-04-12 RX ADMIN — Medication 1 MILLIGRAM(S): at 11:03

## 2024-04-12 RX ADMIN — Medication 200 MILLIGRAM(S): at 17:41

## 2024-04-12 RX ADMIN — Medication 40 MILLIGRAM(S): at 06:24

## 2024-04-12 RX ADMIN — Medication 3 MILLILITER(S): at 15:12

## 2024-04-12 RX ADMIN — Medication 1000 MILLIGRAM(S): at 13:47

## 2024-04-12 RX ADMIN — BUDESONIDE AND FORMOTEROL FUMARATE DIHYDRATE 2 PUFF(S): 160; 4.5 AEROSOL RESPIRATORY (INHALATION) at 21:23

## 2024-04-12 RX ADMIN — Medication 112 MICROGRAM(S): at 06:24

## 2024-04-12 RX ADMIN — PIPERACILLIN AND TAZOBACTAM 25 GRAM(S): 4; .5 INJECTION, POWDER, LYOPHILIZED, FOR SOLUTION INTRAVENOUS at 13:44

## 2024-04-12 RX ADMIN — OXYCODONE HYDROCHLORIDE 10 MILLIGRAM(S): 5 TABLET ORAL at 13:13

## 2024-04-12 RX ADMIN — Medication 1 PATCH: at 18:16

## 2024-04-12 RX ADMIN — POLYETHYLENE GLYCOL 3350 17 GRAM(S): 17 POWDER, FOR SOLUTION ORAL at 11:10

## 2024-04-12 RX ADMIN — OXYCODONE HYDROCHLORIDE 10 MILLIGRAM(S): 5 TABLET ORAL at 04:00

## 2024-04-12 RX ADMIN — HYDROMORPHONE HYDROCHLORIDE 0.5 MILLIGRAM(S): 2 INJECTION INTRAMUSCULAR; INTRAVENOUS; SUBCUTANEOUS at 11:24

## 2024-04-12 RX ADMIN — OXYCODONE HYDROCHLORIDE 10 MILLIGRAM(S): 5 TABLET ORAL at 09:21

## 2024-04-12 RX ADMIN — Medication 3 MILLILITER(S): at 20:07

## 2024-04-12 RX ADMIN — Medication 1 PATCH: at 13:12

## 2024-04-12 RX ADMIN — OXYCODONE HYDROCHLORIDE 10 MILLIGRAM(S): 5 TABLET ORAL at 18:31

## 2024-04-12 RX ADMIN — BUDESONIDE AND FORMOTEROL FUMARATE DIHYDRATE 2 PUFF(S): 160; 4.5 AEROSOL RESPIRATORY (INHALATION) at 09:22

## 2024-04-12 RX ADMIN — SENNA PLUS 2 TABLET(S): 8.6 TABLET ORAL at 21:22

## 2024-04-12 RX ADMIN — CHLORHEXIDINE GLUCONATE 1 APPLICATION(S): 213 SOLUTION TOPICAL at 06:27

## 2024-04-12 RX ADMIN — OXYCODONE HYDROCHLORIDE 5 MILLIGRAM(S): 5 TABLET ORAL at 06:41

## 2024-04-12 RX ADMIN — OXYCODONE HYDROCHLORIDE 5 MILLIGRAM(S): 5 TABLET ORAL at 07:10

## 2024-04-12 RX ADMIN — LIDOCAINE 2 PATCH: 4 CREAM TOPICAL at 18:16

## 2024-04-12 RX ADMIN — OXYCODONE HYDROCHLORIDE 10 MILLIGRAM(S): 5 TABLET ORAL at 13:42

## 2024-04-12 RX ADMIN — Medication 3 MILLILITER(S): at 08:36

## 2024-04-12 RX ADMIN — OXYCODONE HYDROCHLORIDE 5 MILLIGRAM(S): 5 TABLET ORAL at 02:00

## 2024-04-12 RX ADMIN — LIDOCAINE 2 PATCH: 4 CREAM TOPICAL at 11:10

## 2024-04-12 RX ADMIN — HEPARIN SODIUM 5000 UNIT(S): 5000 INJECTION INTRAVENOUS; SUBCUTANEOUS at 21:22

## 2024-04-12 RX ADMIN — OXYCODONE HYDROCHLORIDE 10 MILLIGRAM(S): 5 TABLET ORAL at 18:17

## 2024-04-12 RX ADMIN — Medication 1000 MILLIGRAM(S): at 21:21

## 2024-04-12 RX ADMIN — Medication 1 PATCH: at 11:25

## 2024-04-12 RX ADMIN — LIDOCAINE 1 PATCH: 4 CREAM TOPICAL at 18:16

## 2024-04-12 RX ADMIN — HEPARIN SODIUM 5000 UNIT(S): 5000 INJECTION INTRAVENOUS; SUBCUTANEOUS at 13:42

## 2024-04-12 RX ADMIN — Medication 40 MILLIEQUIVALENT(S): at 13:47

## 2024-04-12 RX ADMIN — HEPARIN SODIUM 5000 UNIT(S): 5000 INJECTION INTRAVENOUS; SUBCUTANEOUS at 06:24

## 2024-04-12 RX ADMIN — LIDOCAINE 1 PATCH: 4 CREAM TOPICAL at 11:10

## 2024-04-12 RX ADMIN — OXYCODONE HYDROCHLORIDE 10 MILLIGRAM(S): 5 TABLET ORAL at 03:13

## 2024-04-12 RX ADMIN — PIPERACILLIN AND TAZOBACTAM 25 GRAM(S): 4; .5 INJECTION, POWDER, LYOPHILIZED, FOR SOLUTION INTRAVENOUS at 06:23

## 2024-04-12 RX ADMIN — PIPERACILLIN AND TAZOBACTAM 25 GRAM(S): 4; .5 INJECTION, POWDER, LYOPHILIZED, FOR SOLUTION INTRAVENOUS at 21:23

## 2024-04-12 RX ADMIN — OXYCODONE HYDROCHLORIDE 10 MILLIGRAM(S): 5 TABLET ORAL at 11:25

## 2024-04-12 RX ADMIN — OXYCODONE HYDROCHLORIDE 5 MILLIGRAM(S): 5 TABLET ORAL at 21:22

## 2024-04-12 RX ADMIN — Medication 1 PATCH: at 11:07

## 2024-04-12 RX ADMIN — Medication 3 MILLILITER(S): at 03:05

## 2024-04-12 RX ADMIN — Medication 1000 MILLIGRAM(S): at 07:10

## 2024-04-12 RX ADMIN — LOSARTAN POTASSIUM 25 MILLIGRAM(S): 100 TABLET, FILM COATED ORAL at 06:25

## 2024-04-12 NOTE — PROGRESS NOTE ADULT - PROBLEM SELECTOR PLAN 1
- s/p fall x 3 since easter   - CT pelvis shows Acute proximal left femoral fracture  - ortho following -- recommending left hip IM nailing  - as per neuro -- MR did not show anything that precludes an orthopedic procedure, ortho made aware

## 2024-04-12 NOTE — PROGRESS NOTE ADULT - PROBLEM SELECTOR PLAN 5
- complains of slurred speech  - imaging negative for acute findings  - neuro Dr Keyes following -- MRs head, c-spine, t-spine. If possible wo AND w contrast  - pt is allergic to contrast dye  - will proceed with MRs non con due to unable to determine if pt is allergic to contrast iodine dye or Gadolinium. Pt recently had MR L-spine non con OP  - MR results as above

## 2024-04-12 NOTE — PROGRESS NOTE ADULT - SUBJECTIVE AND OBJECTIVE BOX
[facility] NEPHROLOGY- PROGRESS NOTE    Patient is a 72yo Female with HTN on Losartan, active smoker with COPD ( not on O2 at home), hypothyroidism, spinal stenosis, urinary incontinence, p/w left hip and back pain s/p fall 2 weeks PTA a/w left hip fracture, SOB 2/2 PNA vs COPD exacerbation with hyponatremia and hypokalemia. Nephrology consulted for electrolyte abnormalities.   +wt loss of 30 lbs in 6 months    Hospital Medications: Medications reviewed.  REVIEW OF SYSTEMS:  CONSTITUTIONAL: No fevers or chills  RESPIRATORY: No shortness of breath  CARDIOVASCULAR: No chest pain. +rib pain  GASTROINTESTINAL: No nausea, vomiting, diarrhea or abdominal pain.   VASCULAR: No bilateral lower extremity edema. +left hip and back pain    VITALS:  T(F): 98 (04-12-24 @ 06:11), Max: 98 (04-11-24 @ 20:54)  HR: 90 (04-12-24 @ 00:27)  BP: 159/82 (04-12-24 @ 06:11)  RR: 20 (04-12-24 @ 06:11)  SpO2: 94% (04-12-24 @ 06:11)  Wt(kg): --  Height (cm): 167.6 (04-09 @ 15:01)  Weight (kg): 39.9 (04-09 @ 15:01)  BMI (kg/m2): 14.2 (04-09 @ 15:01)  BSA (m2): 1.41 (04-09 @ 15:01)    04-11 @ 07:01  -  04-12 @ 07:00  --------------------------------------------------------  IN: 0 mL / OUT: 3320 mL / NET: -3320 mL    PHYSICAL EXAM:  Gen: Cachectic  HEENT: MMM  Neck: no JVD  Cards: RRR, +S1/S2, no M/G/R  Resp: +mild wheezing at bases  GI: soft, NT ND  : +purewick  Extremities: trace LE edema B/L      LABS:  04-12  137 <--, 130 <--, 127 <--, 129 <--, 130 <--  137  |  100  |  8   ----------------------------<  140<H>  3.1<L>   |  31  |  0.40<L>    Ca    8.1<L>      12 Apr 2024 07:37  Phos  2.5     04-12  Mg     2.0     04-12    TPro  5.9<L>  /  Alb  2.6<L>  /  TBili  0.5  /  DBili      /  AST  21  /  ALT  19  /  AlkPhos  78  04-12    Creatinine Trend: 0.40 <--, 0.46 <--, 0.40 <--, 0.43 <--, 0.47 <--                        11.6   8.42  )-----------( 188      ( 12 Apr 2024 07:37 )             34.9     Urine Studies:  Urinalysis Basic - ( 12 Apr 2024 07:37 )    Color:  / Appearance:  / SG:  / pH:   Gluc: 140 mg/dL / Ketone:   / Bili:  / Urobili:    Blood:  / Protein:  / Nitrite:    Leuk Esterase:  / RBC:  / WBC    Sq Epi:  / Non Sq Epi:  / Bacteria:       Sodium, Random Urine: 28 mmol/L (04-11 @ 19:45)  Osmolality, Random Urine: 161 mos/kg (04-11 @ 19:45)  Sodium, Random Urine: 42 mmol/L (04-11 @ 16:35)  Creatinine, Random Urine: 53 mg/dL (04-11 @ 16:35)  Protein/Creatinine Ratio Calculation: 0.6 Ratio (04-11 @ 16:35)  Potassium, Random Urine: 39 mmol/L (04-11 @ 16:35)  Osmolality, Random Urine: 279 mos/kg (04-11 @ 06:30)    RADIOLOGY & ADDITIONAL STUDIES:

## 2024-04-12 NOTE — PROGRESS NOTE ADULT - SUBJECTIVE AND OBJECTIVE BOX
Patient is seen and examined at the bed side, is afebrile. She is appears tired today, on oxygen now. The Family at the bed side.      REVIEW OF SYSTEMS: All other review systems are negative      ALLERGIES: No Known Allergies      Vital Signs Last 24 Hrs  T(C): 36.7 (12 Apr 2024 14:19), Max: 36.7 (11 Apr 2024 20:54)  T(F): 98.1 (12 Apr 2024 14:19), Max: 98.1 (12 Apr 2024 14:19)  HR: 91 (12 Apr 2024 14:19) (90 - 91)  BP: 138/84 (12 Apr 2024 14:19) (130/79 - 159/82)  BP(mean): 102 (12 Apr 2024 14:19) (96 - 107)  RR: 19 (12 Apr 2024 14:19) (18 - 20)  SpO2: 94% (12 Apr 2024 14:19) (94% - 97%)    Parameters below as of 12 Apr 2024 14:19  Patient On (Oxygen Delivery Method): nasal cannula  O2 Flow (L/min): 2      PHYSICAL EXAM:  GENERAL: Not in distress, on oxygen via NC  CHEST/LUNG: Not using accessory muscles   HEART: s1 and s2 present  ABDOMEN:  Nontender and  Nondistended  EXTREMITIES: No pedal  edema  CNS: Awake and Alert      LABS:                        11.6   8.42  )-----------( 188      ( 12 Apr 2024 07:37 )             34.9                           11.9   8.43  )-----------( 187      ( 11 Apr 2024 06:56 )             35.0              04-12    137  |  100  |  8   ----------------------------<  140<H>  3.1<L>   |  31  |  0.40<L>    Ca    8.1<L>      12 Apr 2024 07:37  Phos  2.5     04-12  Mg     2.0     04-12    TPro  5.9<L>  /  Alb  2.6<L>  /  TBili  0.5  /  DBili  x   /  AST  21  /  ALT  19  /  AlkPhos  78  04-12    04-11    130<L>  |  96  |  9   ----------------------------<  143<H>  3.5   |  32<H>  |  0.46<L>    Ca    8.5      11 Apr 2024 19:38  Phos  3.4     04-10  Mg     1.7     04-10    TPro  6.0  /  Alb  x   /  TBili  x   /  DBili  x   /  AST  x   /  ALT  x   /  AlkPhos  x   04-11    PT/INR - ( 10 Apr 2024 05:10 )   PT: 10.9 sec;   INR: 0.95 ratio       PTT - ( 10 Apr 2024 05:10 )  PTT:29.3 sec      MEDICATIONS  (STANDING):    acetaminophen     Tablet .. 1000 milliGRAM(s) Oral every 8 hours  albuterol/ipratropium for Nebulization 3 milliLiter(s) Nebulizer every 6 hours  budesonide 160 MICROgram(s)/formoterol 4.5 MICROgram(s) Inhaler 2 Puff(s) Inhalation two times a day  chlorhexidine 2% Cloths 1 Application(s) Topical <User Schedule>  gabapentin 100 milliGRAM(s) Oral every 8 hours  heparin   Injectable 5000 Unit(s) SubCutaneous every 8 hours  levothyroxine 112 MICROGram(s) Oral daily  lidocaine   4% Patch 1 Patch Transdermal daily  lidocaine   4% Patch 2 Patch Transdermal daily  losartan 25 milliGRAM(s) Oral daily  naloxone Injectable 0.4 milliGRAM(s) IV Push once  nicotine -   7 mG/24Hr(s) Patch 1 Patch Transdermal daily  piperacillin/tazobactam IVPB.. 3.375 Gram(s) IV Intermittent every 8 hours  polyethylene glycol 3350 17 Gram(s) Oral daily  predniSONE   Tablet 40 milliGRAM(s) Oral daily  senna 2 Tablet(s) Oral at bedtime      RADIOLOGY & ADDITIONAL TESTS:      4/9/24 : CT Chest No Cont (04.09.24 @ 17:06) Axial CT images of the chest are obtained without intravenous   administration of contrast.      IMPRESSION: Loss of height of multiple thoracic and lumbar spine vertebral bodies which are of indeterminate age. Clinical correlation for   back pain is recommended. Dedicated spinal MRI can be performed for complete evaluation.    Dominant cluster of nodular and patchy opacities within the superior segment of the right lower lobe suggestive of infection with   endobronchial spread. A 1-3 month follow-up noncontrast chest CT is recommended to ensure improvement/resolution.    Emphysema.    Intrahepatic and extrahepatic biliary ductal dilatation is of unclear etiology. Upper abdominal ultrasound can be performedfor further   evaluation as clinically warranted.      4/10/24: CT Head No Cont (04.10.24 @ 15:55) IMPRESSION: Age-appropriate involutional and ischemic gliotic changes. No   hemorrhage. No change since 4/9/2024.      4/9/24 : Xray Femur 2 Views, Left (04.09.24 @ 20:35) 1. Diffuse bony demineralization associated with a fracture along the   intertrochanteric line of the proximal left femur, with only slight upward migration of the distal segment, but less than one half bone width.  2. Degenerative arthropathy of the left knee.    4/9/24: Xray Pelvis AP only (04.09.24 @ 20:35) 1. Diffuse bony demineralization with a fracture along the   intertrochanteric line of the proximal left femur. No dislocation of the hips.        MICROBIOLOGY DATA:    MRSA/MSSA PCR (04.11.24 @ 07:02)   MRSA PCR Result.: NotDetec:    Legionella pneumophila Antigen, Urine (04.10.24 @ 05:40)   Legionella Antigen, Urine: Negative:      Respiratory Viral Panel with COVID-19 by SHAHANA (04.09.24 @ 21:24)   Rapid RVP Result: NotDetec  SARS-CoV-2: NotDetec:

## 2024-04-12 NOTE — PROGRESS NOTE ADULT - SUBJECTIVE AND OBJECTIVE BOX
CHIEF COMPLAINT  Hip fracture    HISTORY OF PRESENT ILLNESS  ROXY FAN is a 73y Female who presents with a chief complaint of hip fracture    No acute events. No complaints.    REVIEW OF SYSTEMS  A complete review of systems was performed; negative except per HPI    PHYSICAL EXAM  T(C): 36.7 (04-12-24 @ 06:11), Max: 36.7 (04-11-24 @ 20:54)  HR: 90 (04-12-24 @ 00:27) (85 - 90)  BP: 159/82 (04-12-24 @ 06:11) (130/79 - 167/91)  RR: 20 (04-12-24 @ 06:11) (18 - 20)  SpO2: 94% (04-12-24 @ 06:11) (94% - 97%)  Constitutional: alert, awake, in no acute distress  Eyes: PERRL, EOMI  HEENT: normocephalic, atraumatic  Neck: supple, non-tender  Cardiovascular: normal perfusion, tachycardic  Respiratory: normal respiratory efforts; no increased use of accessory muscles  Gastrointestinal: soft, non-tender  Neurological: alert, CN II to XI grossly intact  Skin: warm, dry    LABORATORY DATA                        11.6   8.42  )-----------( 188      ( 12 Apr 2024 07:37 )             34.9     04-11    130<L>  |  96  |  9   ----------------------------<  143<H>  3.5   |  32<H>  |  0.46<L>    Ca    8.5      11 Apr 2024 19:38    TPro  6.0  /  Alb  x   /  TBili  x   /  DBili  x   /  AST  x   /  ALT  x   /  AlkPhos  x   04-11    RADIOLOGY REVIEW  IMPRESSION:  Evaluation of the solid organs, vascular structures and GI tract is   limited without oral and IV contrast.    Limited evaluation for malignancy.    Distended bladder. Mild hydronephrosis may be related to the bladder   distention.    Moderate constipation.

## 2024-04-12 NOTE — PROGRESS NOTE ADULT - PROBLEM SELECTOR PLAN 4
- p/w sodium 130  - likely due to poor oral intake  - Na 137 today -- improved  - nephro Dr Lopez following - p/w sodium 130  - likely due to poor oral intake  - Na 137 today -- improved  - nephro Dr Lopez following  - trend BMP - p/w sodium 130  - likely due to poor oral intake  - FeNa 0.2% -- pre-renal  - Na 137 today -- improved  - nephro Dr Lopez following  - trend BMP

## 2024-04-12 NOTE — PROGRESS NOTE ADULT - PROBLEM SELECTOR PLAN 4
- p/w sodium 130  - likely due to poor oral intake  - FeNa 0.2% -- pre-renal  - Na 137 today -- improved  - nephro Dr Lopez following  - trend BMP

## 2024-04-12 NOTE — PROGRESS NOTE ADULT - PROBLEM SELECTOR PLAN 2
hx of COPD not on home O2, and current smoker  - p/w SOB likely due to PNA  - pt is hypoxic on RA to 80s  - continue nasal cannula to maintain SPO2 >90%  - CT chest shows Loss of height of multiple thoracic and lumbar spine vertebral bodies which are of indeterminate age. Dominant cluster of nodular and patchy opacities within the superior segment of the right lower lobe suggestive of infection with endobronchial spread. Emphysema.  - continue albuterol PRN, duonebs, prednisone 40 mg qd for 5 days (until 4/13) then taper to 20mg for 5 days  - pulm Dr Villatoro following

## 2024-04-12 NOTE — PROGRESS NOTE ADULT - ASSESSMENT
MR studies do not account for the weakness, or the chronic progressive worsening of motor function.      There do not appear to be any imaging findings that would be a contraindication to orthopedic surgery.    Consider spine surgical consultation if there are any remaining questions in this regard.    Follow-up on pending lab alex results (that were recommended in my initial consult note).        Nani Hood M.D.   - Department of Neurology  [place] [facility] at [facility]    MR studies do not account for the weakness, or the chronic progressive worsening of motor function.      There do not appear to be any imaging findings that would be a contraindication to orthopedic surgery.    Consider spine surgical consultation if there are any remaining questions in this regard.    Follow-up on pending lab test results.  Please order  isabel GAMBLE M.D.   - Department of Neurology  [place] [facility] at [facility]    MR studies do not account for the weakness, or the chronic progressive worsening of motor function.      There do not appear to be any imaging findings that would be a contraindication to orthopedic surgery.    Consider spine surgical consultation if there are any remaining questions in this regard.    Follow-up on pending lab test results.  Please order CCP, acetylcholine blocking and receptor abs.            Nani Hood M.D.   - Department of Neurology  [place] [facility] at [facility]    MR studies do not account for the weakness, or the chronic progressive worsening of motor function.      There do not appear to be any imaging findings that would be a contraindication to orthopedic surgery.    Consider spine surgical consultation if there are any remaining questions in this regard.    Follow-up on pending lab test results.  Please order CCP, acetylcholine blocking and receptor abs.          Ideally should get repeat MR studies with contrast (may need to resolve the question as to what kind of contrast reaction she may have had in the past).    Progressive weakness over > 5years, to the point of becoming essentially bed-bound.    Progressive weight loss to the point of becoming cachectic.      At least three recent falls, the last one resulting in this hospitalization with an acute L hip Fx.    Severe age-indeterminate L5 vertebral Fx.    Chronic compression deformities of multiple vertebrae.    Profound generalized weakness with no obvious focal, lateralized, or topographic pattern.  She is so profoundly weak, and has so little ability to sustain motor effort, that is is essentially impossible to detect any such pattern or distribution of weakness.    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  Apparently no gross field cut.  ?Paramedian L pontine infarct.  No brainstem infarct seen on MR (many ischemic cerebral infarcts are CT- and MR- negative).    Lung nodule; possible malignancy.    Possible remote effects of Ca.    Almost totally absent DTRs.      Bilateral extensor plantar responses, indicating bilateral involvement of the corticospinal pathways.      Severe widespread OA.    Spondylosis throughout the spine.      Dysarthria (could be a feature of neuromuscular conduction disorder OR brainstem infarct).      Possible neuromuscular conduction disorder (MG? LEMS?) has not excluded.      Discussed with  and daughter at length; answered all questions. Discussed with medical team.                                                           IMPORTANT  -  PLEASE NOTE:                              I am a neurohospitalist. I do not see patients outside of the hospital.        Patients requiring neurological follow-up after discharge may contact one of the following offices.     [facility] [address] [place] [address]  [phone]    [place] [address] [facility].  [place], [place]  [phone]    Nani Hood M.D.   - Department of Neurology  [place] [facility]                  Nani Hood M.D.   - Department of Neurology  [place] [facility]    MR studies do not account for the weakness, or the chronic progressive worsening of motor function.      There do not appear to be any imaging findings that would be a contraindication to orthopedic surgery.    Consider spine surgical consultation if there are any remaining questions in this regard.    Follow-up on pending lab test results.  Please order CCP, acetylcholine blocking and receptor abs.          Ideally should get repeat MR studies with contrast (may need to resolve the question as to what kind of contrast reaction she may have had in the past).    Progressive weakness over > 5years, to the point of becoming essentially bed-bound.    Progressive weight loss to the point of becoming cachectic.      At least three recent falls, the last one resulting in this hospitalization with an acute L hip Fx.    Severe age-indeterminate L5 vertebral Fx.    Chronic compression deformities of multiple vertebrae.    Profound generalized weakness with no obvious focal, lateralized, or topographic pattern.  She is so profoundly weak, and has so little ability to sustain motor effort, that is is essentially impossible to detect any such pattern or distribution of weakness.    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  Apparently no gross field cut.  ?Paramedian L pontine infarct.  No brainstem infarct seen on MR (many ischemic cerebral infarcts are CT- and MR- negative).    Lung nodule; possible malignancy.    Possible remote effects of Ca.    Almost totally absent DTRs.      Bilateral extensor plantar responses, indicating bilateral involvement of the corticospinal pathways.      Severe widespread OA.    Spondylosis throughout the spine.      Dysarthria (could be a feature of neuromuscular conduction disorder OR brainstem infarct).      Possible neuromuscular conduction disorder (MG? LEMS?) has not excluded.    Cigarette smoking 60 pack years        Discussed with  and daughter at length; answered all questions. Discussed with medical team.                                                           IMPORTANT  -  PLEASE NOTE:                              I am a neurohospitalist. I do not see patients outside of the hospital.        Patients requiring neurological follow-up after discharge may contact one of the following offices.     [facility]  [address] [place] [address].  [place] [address]  [phone]    [address]  [place], [place]  [phone]    Nani Hood M.D.   - Department of Neurology  [place] [facility]                  Nani Hood M.D.   - Department of Neurology  [place] and [facility] at [facility]    MR studies do not account for the weakness, or the chronic progressive worsening of motor function.      There do not appear to be any imaging findings that would be a contraindication to orthopedic surgery.  Consider spine surgical consultation if there are any remaining questions in this regard.    Follow-up on pending lab test results.    I am ordering CCP, acetylcholine blocking and receptor abs.          Ideally should get repeat MR studies with contrast (may need to resolve the question as to what kind of contrast reaction she may have had in the past).    Progressive weakness over > 5years, to the point of becoming essentially bed-bound.    Progressive weight loss to the point of becoming cachectic.      At least three recent falls, the last one resulting in this hospitalization with an acute L hip Fx.    Severe age-indeterminate L5 vertebral Fx.    Chronic compression deformities of multiple vertebrae.    Profound generalized weakness with no obvious focal, lateralized, or topographic pattern.  She is so profoundly weak, and has so little ability to sustain motor effort, that is is essentially impossible to detect any such pattern or distribution of weakness.    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  Apparently no gross field cut.  ?Paramedian L pontine infarct.  No brainstem infarct seen on MR (many ischemic cerebral infarcts are CT- and MR- negative).    Lung nodule; possible malignancy.    Possible remote effects of Ca; possible autoimmune paraneoplastic syndrome.    I am ordering serum paraneoplastic autoantibody panel.    Almost totally absent DTRs.      Bilateral extensor plantar responses, indicating bilateral involvement of the corticospinal pathways.      Severe widespread OA.    Spondylosis throughout the spine.      Dysarthria (could be a feature of neuromuscular conduction disorder OR brainstem infarct).      Possible neuromuscular conduction disorder (MG? LEMS?) has not excluded.    Cigarette smoking 60 pack years        Discussed with  and daughter at length; answered all questions. Discussed with medical team.                                                           IMPORTANT  -  PLEASE NOTE:                              I am a neurohospitalist. I do not see patients outside of the hospital.        Patients requiring neurological follow-up after discharge may contact one of the following offices.     [address] [place] [address] [phone]    [facility]  [address] [facility].  [place], [place]  [phone]    Nani Hood M.D.   - Department of Neurology  [facility] [facility] [facility]                  Nani Hood M.D.   - Department of Neurology  [place] [facility] at [facility]

## 2024-04-12 NOTE — PROGRESS NOTE ADULT - ASSESSMENT
Patient is a 74yo Female with HTN on Losartan, active smoker with COPD ( not on O2 at home), hypothyroidism, spinal stenosis, urinary incontinence, p/w left hip and back pain s/p fall 2 weeks PTA a/w left hip fracture, SOB 2/2 PNA vs COPD exacerbation with hyponatremia and hypokalemia. Nephrology consulted for electrolyte abnormalities.     1. Hyponatremia- SIADH due to pain. Serum Na improving and now wnl. Repeat urine studies: Urine Osm 161 with urine Na 28; likely auto-correcting. Recc pain control (avoid NSAIDs). Pt with poor po intake; will give Ensure tid   TSH 5.92; mild elevation; defer to primary team. Monitor serum sodium.  2. Hypokalemia- Pt ordered for KCl 40 meq PO x1. Monitor serum potassium  3. Essential HTN- BP elevated likely due to pain. Recc pain control. If BP remains elevated; recc to increase Losartan to 50mg PO daily, titrate as needed. Monitor BP  4. Acute left femoral fracture- avoid NSAIDs. Plan as per Ortho    Plan discussed with pt's daughter and  at [facility]  Natan Addison M.D.  Júnior Borrego D.O.  Laurence Lopez M.D.  MD Katherine Garner, MSN, ANP-C    Telephone: [phone]  Facsimile: [phone]    [facility] [place] [address], [address]  [place] [address]

## 2024-04-12 NOTE — PROGRESS NOTE ADULT - SUBJECTIVE AND OBJECTIVE BOX
Time of Visit:  Patient seen and examined.     MEDICATIONS  (STANDING):  acetaminophen     Tablet .. 1000 milliGRAM(s) Oral every 8 hours  albuterol/ipratropium for Nebulization 3 milliLiter(s) Nebulizer every 6 hours  budesonide 160 MICROgram(s)/formoterol 4.5 MICROgram(s) Inhaler 2 Puff(s) Inhalation two times a day  chlorhexidine 2% Cloths 1 Application(s) Topical <User Schedule>  gabapentin 100 milliGRAM(s) Oral every 8 hours  guaiFENesin Oral Liquid (Sugar-Free) 200 milliGRAM(s) Oral every 6 hours  heparin   Injectable 5000 Unit(s) SubCutaneous every 8 hours  levothyroxine 112 MICROGram(s) Oral daily  lidocaine   4% Patch 1 Patch Transdermal daily  lidocaine   4% Patch 2 Patch Transdermal daily  losartan 25 milliGRAM(s) Oral daily  naloxone Injectable 0.4 milliGRAM(s) IV Push once  nicotine -   7 mG/24Hr(s) Patch 1 Patch Transdermal daily  piperacillin/tazobactam IVPB.. 3.375 Gram(s) IV Intermittent every 8 hours  polyethylene glycol 3350 17 Gram(s) Oral daily  predniSONE   Tablet 40 milliGRAM(s) Oral daily  senna 2 Tablet(s) Oral at bedtime      MEDICATIONS  (PRN):  albuterol    90 MICROgram(s) HFA Inhaler 2 Puff(s) Inhalation every 6 hours PRN Bronchospasm  ALPRAZolam 1 milliGRAM(s) Oral every 12 hours PRN Anxiety  benzonatate 100 milliGRAM(s) Oral every 8 hours PRN Cough  bisacodyl 5 milliGRAM(s) Oral daily PRN Constipation  cyclobenzaprine 5 milliGRAM(s) Oral every 8 hours PRN Spasm  oxyCODONE    IR 5 milliGRAM(s) Oral every 4 hours PRN Moderate Pain (4 - 6)  oxyCODONE    IR 10 milliGRAM(s) Oral every 4 hours PRN Severe Pain (7 - 10)       Medications up to date at time of exam.    ROS; No fever, chills, nasal congestion .    PHYSICAL EXAMINATION:    Vital Signs Last 24 Hrs  T(C): 36.7 (12 Apr 2024 14:19), Max: 36.7 (11 Apr 2024 20:54)  T(F): 98.1 (12 Apr 2024 14:19), Max: 98.1 (12 Apr 2024 14:19)  HR: 91 (12 Apr 2024 14:19) (90 - 91)  BP: 138/84 (12 Apr 2024 14:19) (130/79 - 159/82)  BP(mean): 102 (12 Apr 2024 14:19) (96 - 107)  RR: 19 (12 Apr 2024 14:19) (18 - 20)  SpO2: 94% (12 Apr 2024 14:19) (94% - 97%)    Parameters below as of 12 Apr 2024 14:19  Patient On (Oxygen Delivery Method): nasal cannula  O2 Flow (L/min): 2     (if applicable)    General: Alert and oriented. Able to answer question with no SOB. No acute distress.     HEENT: Bi temporal wasting . Extraocular muscles are intact. Mucous membranes are moist.     NECK: Supple, no palpable adenopathy.    LUNGS: Non labored. No wheezing. No use of accessory muscle.     HEART: S1 S2 Regular rate and no click / rub.     ABDOMEN: Soft, nontender, and nondistended.  Active bowel sounds.     EXTREMITIES: Without any cyanosis, clubbing, rash, lesions or edema.    NEUROLOGIC: Awake, alert, oriented.     SKIN: Warm and moist . Non diaphoretic.       LABS:                        11.6   8.42  )-----------( 188      ( 12 Apr 2024 07:37 )             34.9     04-12    137  |  100  |  8   ----------------------------<  140<H>  3.1<L>   |  31  |  0.40<L>    Ca    8.1<L>      12 Apr 2024 07:37  Phos  2.5     04-12  Mg     2.0     04-12    TPro  5.9<L>  /  Alb  2.6<L>  /  TBili  0.5  /  DBili  x   /  AST  21  /  ALT  19  /  AlkPhos  78  04-12      Urinalysis Basic - ( 12 Apr 2024 07:37 )    Color: x / Appearance: x / SG: x / pH: x  Gluc: 140 mg/dL / Ketone: x  / Bili: x / Urobili: x   Blood: x / Protein: x / Nitrite: x   Leuk Esterase: x / RBC: x / WBC x   Sq Epi: x / Non Sq Epi: x / Bacteria: x        CARDIAC MARKERS ( 11 Apr 2024 06:56 )  x     / x     / 157 U/L / x     / x      CARDIAC MARKERS ( 10 Apr 2024 20:28 )  x     / x     / 213 U/L / x     / x                    MICROBIOLOGY: (if applicable)    RADIOLOGY & ADDITIONAL STUDIES:  CT < from: CT Chest No Cont (04.09.24 @ 17:06) >    ACC: [id number] EXAM:  CT CHEST   ORDERED BY: SAMEER DACOSTA DATE:  04/09/2024          INTERPRETATION:  CLINICAL INDICATION: CHEST PAIN, STATUS POST FALL,   SHORTNESS OF BREATH.    Axial CT images of the chest are obtained without intravenous   administration of contrast.    No prior chest CTs are available for comparison.    No enlarged axillary, mediastinal or hilar lymph nodes.    No pleural or pericardial effusions. Heart size is normal. Mitral annular   calcifications. Aortic intimal calcifications. Aorta is tortuous.    Evaluation of the upper abdomen demonstrate intrahepatic and extrahepatic   biliary ductal dilatation. For reference the partially imaged common bile   duct measures about 1 cm.    Evaluation of the lungs demonstrate emphysema. No pneumothorax. Impaction   of some of the bilateral lower lung segmental and subsegmental airways   likely due to internal secretions. Mild bilateral lower lung   intraparenchymal airways wall thickening, a nonspecific findingmay be   related to the history of smoking.    Right upper lobe apical branching linear ill-defined opacity containing   internal foci of calcification, part of which measures about 2.4 x 1.5 cm   may be due to scarring.    Cluster of a few ill-defined nodular and patchy opacities within the   superior segment of the right lower lobe largest measuring about 2.2 cm.   Other scattered bilateral nodular opacities are also noted with 1 of the   larger 1's in the right lower lobe in a para-aortic location measuring   about 6 mm.    No central endobronchial lesions.    Degenerative changes of the spine spine. Bones are osteopenic.    Loss of height of multiple thoracic and lumbar vertebral bodies which are   of indeterminate age. For reference there is severe loss of height of the   T7 and moderate loss of height of the T6 and T9 vertebral bodies. Spinal   kyphosis.    IMPRESSION: Loss of height of multiple thoracic and lumbar spine   vertebral bodies which are of indeterminate age. Clinical correlation for   back pain is recommended. Dedicated spinal MRI can be performed for   complete evaluation.    Dominant cluster of nodular and patchy opacities within the superior   segment of the right lower lobe suggestive of infection with   endobronchial spread. A 1-3 month follow-up noncontrast chest CT is   recommended to ensure improvement/resolution.    Emphysema.    Intrahepatic and extrahepatic biliary ductal dilatation is of unclear   etiology. Upper abdominal ultrasound can be performedfor further   evaluation as clinically warranted.    --- End of Report ---            GUERO KNIGHT MD; Attending Radiologist  This document has been electronically signed. Apr 9 2024  5:20PM    < end of copied text >     CXR:  ECHO:    IMPRESSION: 73y Female PAST MEDICAL & SURGICAL HISTORY:  HTN (hypertension)      Hypothyroidism      COPD (chronic obstructive pulmonary disease)      Spinal stenosis      Current smoker      H/O foot surgery       Impression: This is a 74 Y/O Female with s/p multiple Falls at Home and sustained Left Hip fracture . Presented with complains of episode of SOB , hypoxia in ED with o2 saturation 80s room air due to Acute hypoxic respiratory failure secondary to combination of Right lung Pneumonia showing on CT chest   and COPD exacerbation , Active smoker . Negative swab for RVP, Covid. Negative UA for legionella .     Suggestion:  O2 saturation 93% room air at rest . Can have Oxygen supplementation 2L NC .  Continue Duoneb via nebulization Q 6 hours . Do not give at the same time with PRN Albuterol 90 mcg 2 puffs Q 6 Hours.   Continue Symbicort 160- 4.5 mcg 2 puffs Twice daily. using Wixela Twice daily outpatient .   On Zosyn 3.375 Gm IVPB Q 8 Hours .  Continue Prednisone 40 mg Oral daily x 5 days then taper to 20 mg Oral Daily x 5 days.   Orthopedic following .   Reinforced the importance of smoking cessation  ( is a smoker too )   , On nicotine patch on x 12 hours.     Daughter Sallie ,  at bedside and all questions answered and updated with pulmonary plan of care.

## 2024-04-12 NOTE — PROGRESS NOTE ADULT - PROBLEM SELECTOR PLAN 7
h/o HTN on losartan 25 mg qd  - continue home med losartan h/o HTN on losartan 25 mg qd  - continue home med losartan  - 's/80's

## 2024-04-12 NOTE — PROGRESS NOTE ADULT - PROBLEM SELECTOR PLAN 1
- s/p fall x 3 since [place]   - CT pelvis shows Acute proximal left femoral fracture  - ortho following -- recommending left hip IM nailing  - as per neuro -- MR did not show anything that precludes an orthopedic procedure - s/p fall x 3 since [place]   - CT pelvis shows Acute proximal left femoral fracture  - ortho following -- recommending left hip IM nailing  - as per neuro -- MR did not show anything that precludes an orthopedic procedure, ortho made aware

## 2024-04-12 NOTE — PROGRESS NOTE ADULT - PROBLEM SELECTOR PLAN 5
- complains of slurred speech  - imaging negative for acute findings  - neuro Dr Keyes following -- MRs head, c-spine, t-spine. If possible wo AND w contrast  - pt is allergic to contrast dye  - will proceed with MRs non con due to unable to determine if pt is allergic to contrast iodine dye or Gadolinium. Pt recently had MR L-spine non con OP - complains of slurred speech  - imaging negative for acute findings  - neuro Dr Keyes following -- MRs head, c-spine, t-spine. If possible wo AND w contrast  - pt is allergic to contrast dye  - will proceed with MRs non con due to unable to determine if pt is allergic to contrast iodine dye or Gadolinium. Pt recently had MR L-spine non con OP  - MR results as above

## 2024-04-12 NOTE — PROGRESS NOTE ADULT - PROBLEM SELECTOR PLAN 6
pt states that she lost about 30 lbs in the last 2 months  - She is undergoing colonoscopy o/p to evaluate for weight loss, family history of colon ca in her father  - tumor markers neg  - CT chest showed: Dominant cluster of nodular and patchy opacities within the superior segment of the right lower lobe suggestive of infection with endobronchial spread  - F/U CT A/P non con -- pt is allergic to contrast dye  - hem/onc Dr Emerson following pt states that she lost about 30 lbs in the last 2 months  - She is undergoing colonoscopy o/p to evaluate for weight loss, family history of colon ca in her father  - tumor markers neg  - CT chest showed: Dominant cluster of nodular and patchy opacities within the superior segment of the right lower lobe suggestive of infection with endobronchial spread  - F/U CT A/P non con -- pt is allergic to contrast dye  - hem/onc Dr Emerson following -- ·Likely PET/CT outpatient, No other planned oncologic workup inpatient at this time

## 2024-04-12 NOTE — PROGRESS NOTE ADULT - SUBJECTIVE AND OBJECTIVE BOX
Patient is a 73y old  Female who presents with a chief complaint of L hip fracture (12 Apr 2024       INTERVAL HPI/OVERNIGHT EVENTS:  73-year-old female from home ambulates with a walker at baseline with PMHx of HTN, COPD (not on O2 at home), current smoker with 60 ppy hx, hypothyroidism, spinal stenosis, urinary incontinence, presenting with left-sided lower back and hip pain after fall 2 weeks ago. CTH and cervical spine shows No gross acute intracranial hemorrhage. CT head no acute intracranial hemorrhage. CT pelvis shows Acute proximal left femoral fracture. Pt is admitted for L hip fracture and COPD exacerbation 2/2 PNA.  Ortho following, recommending left hip IM nailing. Pt is not agreeable for the procedure at this time, will re-visit this discussion as per ortho. Also, there was a concern for slurred speech and possible stroke. Neuro following, recommending adequate imaging evaluation of her spine, especially the cervical spine, should precede operation (precede getting intubated for respiratory support). As per pt and family, she is allergic to contrast dye (unable to recall the reaction) hence CT scan with contrast is unable to perform. Pt recently had MR of lumbar spine non con (3 weeks ago). At this time, we will proceed with MR head C/T-spine non contrast as per neuro recs.  Nephro consulted for hyponatremia and pulmonology for COPD exacerbation    Spoke with daughter Sallie,  and patient in lengthy, updated about the plan of care, answered all questions. Pt is complaining of generalized pain, pain mgt following. Spoke with neuro about the MR results, as per neuro, there is nothing from the imaging that precludes an orthopedic procedure. Ortho made aware, will F/U when the procedure will be scheduled. Discussed with primary attending, Dr Muro    MEDICATIONS  (STANDING):  acetaminophen     Tablet .. 1000 milliGRAM(s) Oral every 8 hours  albuterol/ipratropium for Nebulization 3 milliLiter(s) Nebulizer every 6 hours  budesonide 160 MICROgram(s)/formoterol 4.5 MICROgram(s) Inhaler 2 Puff(s) Inhalation two times a day  chlorhexidine 2% Cloths 1 Application(s) Topical <User Schedule>  gabapentin 100 milliGRAM(s) Oral every 8 hours  heparin   Injectable 5000 Unit(s) SubCutaneous every 8 hours  levothyroxine 112 MICROGram(s) Oral daily  lidocaine   4% Patch 1 Patch Transdermal daily  lidocaine   4% Patch 2 Patch Transdermal daily  losartan 25 milliGRAM(s) Oral daily  naloxone Injectable 0.4 milliGRAM(s) IV Push once  nicotine -   7 mG/24Hr(s) Patch 1 Patch Transdermal daily  piperacillin/tazobactam IVPB.. 3.375 Gram(s) IV Intermittent every 8 hours  polyethylene glycol 3350 17 Gram(s) Oral daily  predniSONE   Tablet 40 milliGRAM(s) Oral daily  senna 2 Tablet(s) Oral at bedtime    MEDICATIONS  (PRN):  albuterol    90 MICROgram(s) HFA Inhaler 2 Puff(s) Inhalation every 6 hours PRN Bronchospasm  ALPRAZolam 1 milliGRAM(s) Oral every 12 hours PRN Anxiety  benzonatate 100 milliGRAM(s) Oral every 8 hours PRN Cough  bisacodyl 5 milliGRAM(s) Oral daily PRN Constipation  cyclobenzaprine 5 milliGRAM(s) Oral every 8 hours PRN Spasm  oxyCODONE    IR 5 milliGRAM(s) Oral every 4 hours PRN Moderate Pain (4 - 6)  oxyCODONE    IR 10 milliGRAM(s) Oral every 4 hours PRN Severe Pain (7 - 10)      __________________________________________________  REVIEW OF SYSTEMS:  " not too good"  CONSTITUTIONAL: No fever,   EYES: no acute visual disturbances  NECK: No pain or stiffness  RESPIRATORY: No cough; No shortness of breath  CARDIOVASCULAR: No chest pain, no palpitations  GASTROINTESTINAL: No pain. No nausea or vomiting; No diarrhea   NEUROLOGICAL: No headache or numbness, no tremors  MUSCULOSKELETAL: No joint pain, no muscle pain  GENITOURINARY: no dysuria, no frequency, no hesitancy  ALL OTHER  ROS negative        Vital Signs Last 24 Hrs  T(C): 36.7 (12 Apr 2024 06:11), Max: 36.7 (11 Apr 2024 20:54)  T(F): 98 (12 Apr 2024 06:11), Max: 98 (11 Apr 2024 20:54)  HR: 90 (12 Apr 2024 00:27) (90 - 90)  BP: 159/82 (12 Apr 2024 06:11) (130/79 - 159/82)  BP(mean): 107 (12 Apr 2024 06:11) (96 - 107)  RR: 20 (12 Apr 2024 06:11) (18 - 20)  SpO2: 94% (12 Apr 2024 06:11) (94% - 97%)    Parameters below as of 12 Apr 2024 06:11  Patient On (Oxygen Delivery Method): nasal cannula  O2 Flow (L/min): 2      ________________________________________________  PHYSICAL EXAM:  GENERAL: NAD, frail  HEENT: Normocephalic; conjunctivae and sclerae clear; moist mucous membranes;   NECK : supple  CHEST/LUNG: Diminished   HEART: S1 S2  regular;  ABDOMEN: Soft, Nontender, Nondistended; Bowel sounds present  EXTREMITIES: no cyanosis; no edema; no calf tenderness  SKIN: warm and dry; no rash  NERVOUS SYSTEM:  Awake and alert;   _________________________________________________  LABS:                        11.6   8.42  )-----------( 188      ( 12 Apr 2024 07:37 )             34.9     04-12    137  |  100  |  8   ----------------------------<  140<H>  3.1<L>   |  31  |  0.40<L>    Ca    8.1<L>      12 Apr 2024 07:37  Phos  2.5     04-12  Mg     2.0     04-12    TPro  5.9<L>  /  Alb  2.6<L>  /  TBili  0.5  /  DBili  x   /  AST  21  /  ALT  19  /  AlkPhos  78  04-12      Urinalysis Basic - ( 12 Apr 2024 07:37 )    Color: x / Appearance: x / SG: x / pH: x  Gluc: 140 mg/dL / Ketone: x  / Bili: x / Urobili: x   Blood: x / Protein: x / Nitrite: x   Leuk Esterase: x / RBC: x / WBC x   Sq Epi: x / Non Sq Epi: x / Bacteria: x      CAPILLARY BLOOD GLUCOSE            RADIOLOGY & ADDITIONAL TESTS:  < from: MR Thoracic Spine No Cont (04.11.24 @ 19:15) >    ACC: [id number] EXAM:  MR BRAIN   ORDERED BY: TAMMI CHRISTIE     ACC: [id number] EXAM:  MR SPINE THORACIC   ORDERED BY: TAMMI CHRISTIE     ACC: [id number] EXAM:  MR SPINE CERVICAL   ORDERED BY: TAMMI CHRISTIE     PROCEDURE DATE:  04/11/2024          INTERPRETATION:  Three examinations were performed in this patient:  1.  MR of the brain without gadolinium contrast  2.  MR cervical spine without gadolinium contrast  3.  MR thoracic spine without gadolinium contrast    CLINICAL INFORMATION: Post fall trauma cord compression  HMR  Admitting   Dxs: S72.009A FRACTURE OF UNSP PART OF NECK OF UNSP FEMUR, INIT    TECHNIQUE:  1. MR brain:  Sagittal and axial T1-weighted images, axial FLAIR images,   axial susceptibility weighted images, axial T2-weighted images and axial   diffusion weighted images of the brain were obtained.  2.  MR cervical spine:   Sagittal T2-weighted images were obtained.  3.  MR thoracic spine:   Sagittal T2-weighted images were obtained.  The remainder these examinations could not be completed, not tolerated by   the patient. The images that could be obtained are degraded by patient   motion.  CONTRAST:    None    COMPARISON:   CT head 4/10/2023 and CT cervical and CT chest 4/9/2023    FINDINGS:    BRAIN    BRAIN:   The brain demonstrates small focal lesions of remote deep   infarction. The most conspicuous are noted in the external capsules. No   cerebral cortical lesion is recognized.   No diffusion restriction is   found in the brain.  No acute cerebral cortical infarct is found.   No   intracranial hemorrhage is recognized.  No mass effect is found in the   brain.   The brain also demonstrates patchy indistinct lesions within the   deep cerebral hemispheric white matter typical for ischemic white matter   disease.    CSF SPACES:   The ventricles, sulci and basal cisterns appear moderately   dilated reflecting diffuse brain volume loss.    VESSELS:   The vertebral and internal carotid arteries demonstrate   expected flow voids indicating their patency.    HEAD AND NECK STRUCTURES:   The orbits are unremarkable.  Paranasal   sinuses are clear.  The nasal cavity demonstrates left-to-right septal   deviation. Left middle turbinate tristen bullosa is present..  The central   skull base appears intact.  The nasopharynx is symmetric.  The temporal   bones appear clear of disease.  The calvarium appears unremarkable.    CERVICAL SPINE    Cervical vertebral alignment is significant for grade 1 anterior   listhesis C4 on C5, also slight retrolisthesis C5 on C6.   Facet joints   appear aligned.   Cervical vertebral body heights are maintained.   Cervical vertebral marrow signal intensity mildly heterogeneous from   degenerative endplate changes. These include small marginal osteophytes   most prominently at C5-C6.  No osseous expansion or epidural disease is   found.  No destructive bone lesion is found.   There are osteoarthritic   changes at the articulation of the anterior ring of C1 and the odontoid   process of C2.  This arthropathy includes marginal osteophytes,   subchondral sclerosis and joint space narrowing.    Cervical intervertebral disc spaces demonstrate a generalized pattern of   degeneration characterized by signal intensity loss on the long TR   images. Degenerative disc heightloss appears greatest at C5-C6.   Evaluation of degenerative disc disease is limited in the absence of   axial images.   However, no degenerative high-grade central canal   compromise is recognized.  Neural foramina appear narrowed by disc   materialand uncovertebral joint osteophytes most prominently at the   lower cervical intervertebral disc levels..    Cervical cord morphology Is preserved.  The cervical cord maintains   intact signal intensity   No focal intrinsic cord lesion is identified.   No cord expansion or cord volume loss is recognized.    The visualized adjacent neck structures appear intact.  No neck mass is   recognized.  Paraspinal soft tissues appear intact.  Visualized lymph   nodes appear to be within physiologic size limits.    THORACIC SPINE    Thoracic vertebral alignment demonstrates exaggeration of the mid   thoracic kyphosis. This vertebral malalignment has an apex at the T7   inferior endplate compression fracture. Approximately 70% height loss   results at this level. More limited compression fractures involve the T6   inferior endplate and the T9 superior endplate. Schmorl's nodes result.   More limited superior endplate deformity of T5 and T11. The remaining   thoracic vertebral body heights are maintained. Superior endplate   deformities are also present at L1-L2 and L4. At the L2 vertebral body   burst fracture pattern is present with impression upon the ventral thecal   sac but does not appear to compromise the cauda equina. At the thoracic   vertebral fracture is no high-grade central canal compromise is   recognized. Thoracic vertebral marrow signal intensity relatively   homogeneous without recognition of significant marrow edema.  No   destructive bone lesion, osseous expansion or epidural disease is found.    The paraspinal soft tissues appear intact.    Thoracic intervertebral disc spaces demonstrate a generalized pattern of   at least mild degeneration with signal intensity loss on the long TR   images. Disc heights are largely maintained.   No high-grade degenerative   central canal compromise is recognized. Thoracic neural foramina appear   intact.    The thoracic cord morphology is preserved.  The thoracic cord maintains   intact signal intensity.      IMPRESSION:    1. BRAIN:   Basal ganglia lacunar infarctions. Ischemic white matter   disease and atrophy typical for age    2. CERVICAL SPINE:   Grade 1 anterolisthesis C4 on C5. Low-grade cervical   degenerative disc disease. Cervical cord appears intact.    3. THORACIC SPINE:   Exaggeration in mid thoracic kyphosis with multiple   thoracic endplate deformities that are age indeterminate but felt likely   to be long-standing. Thoracic cord appears intact.    4. Only limited incomplete examinations could be performed on this date.   Recommend completion scans once tolerated by the patient.    --- End of Report ---            GRAZYNA CRAWLEY MD; Attending Radiologist  This document has been electronically signed. Apr 11 2024  8:02PM    < end of copied text >    Imaging Personally Reviewed:  YES/NO    Consultant(s) Notes Reviewed:   YES/ No    Care Discussed with Consultants :     Plan of care was discussed with patient and /or primary care giver; all questions and concerns were addressed and care was aligned with patient's wishes.

## 2024-04-12 NOTE — PROGRESS NOTE ADULT - PROBLEM SELECTOR PLAN 12
DVT: Lovenox    Dispo: pt is from home, lives with . Has 4 children involved in all medical decisions.  pending schedule for left hip IM nailing  PT post op

## 2024-04-12 NOTE — PROGRESS NOTE ADULT - ASSESSMENT
Patient is a 73y old  Female who is from home ambulates with a walker at baseline with PMH of HTN, COPD ( not on O2 at home), current smoker with 60 ppy hx, hypothyroidism, spinal stenosis urinary incontinence, now presents to the ER for evaluation of left-sided lower back and hip pain after fall 2 weeks ago. Approximately 1-2 weeks ago the patient fell while walking and her leg "gave out" and since then she has had left-sided leg pain.  Patient reports she is able to walk with her cane/walker but feels very weak and unbalanced and has since fallen 2 more times. Pt also complains of SOB and chest pain since the fall today after her cane hit her chest.  On admission, she has no fever but found to be hypoxic on RA to 80s saturating well on 1-2 L NC. The CT chest shows Dominant cluster of nodular and patchy opacities within the superior segment of the right lower lobe suggestive of infection with endobronchial spread. Emphysema. Intrahepatic and extrahepatic biliary ductal dilatation is of unclear etiology. CT pelvis shows Acute proximal left femoral fracture,  Age-indeterminate severe compression fracture of the L5 vertebral body. Pt is admitted for L hip fracture/ SOB 2/2 PNA vs COPD exacerbation. She has started on Zosyn and the ID consult requested to assist with further evaluation and antibiotic management.    # Pneumonia - Legionella urine Ag and MRSA PCR is negative   # Left femoral fracture, s/p Fall- Surgery on 4/11 as per Ortho, but was not done since patient is not optimized yet    would recommend :    1. Supplemental oxygenation and Bronchodilator as needed  2. Aspiration precaution  3. Continue Zosyn X total of 7 days  4. Pain management as needed    d/w patient and family at the bed side     Attending Attestation:    Spent more than 35 minutes on total encounter, more than 50 % of the visit was spent counseling and/or coordinating care by the Attending physician.

## 2024-04-12 NOTE — PROGRESS NOTE ADULT - ASSESSMENT
ROXY FAN is a 73y Female who presents with a chief complaint of hip fracture    Weight Loss  ·	Patient follows with WILLIE Hector  ·	CT abdomen/pelvis not contributory with lack of contrast  ·	Likely PET/CT outpatient.  ·	Will need to see if patient had seen gastroenterology recent for endoscopic examination  ·	No other planned oncologic workup inpatient at this time    Will continue to follow.    Alex Alexander MD  Hematology/Oncology  O: [phone]

## 2024-04-12 NOTE — PROGRESS NOTE ADULT - PROBLEM SELECTOR PLAN 6
pt states that she lost about 30 lbs in the last 2 months  - She is undergoing colonoscopy o/p to evaluate for weight loss, family history of colon ca in her father  - tumor markers neg  - CT chest showed: Dominant cluster of nodular and patchy opacities within the superior segment of the right lower lobe suggestive of infection with endobronchial spread  - F/U CT A/P non con -- pt is allergic to contrast dye  - hem/onc Dr Emerson following -- ·Likely PET/CT outpatient, No other planned oncologic workup inpatient at this time

## 2024-04-12 NOTE — PROGRESS NOTE ADULT - PROBLEM SELECTOR PLAN 12
DVT: Lovenox    Dispo: pt is from home, lives with . Has 4 children involved in all medical decisions. DVT: Lovenox    Dispo: pt is from home, lives with . Has 4 children involved in all medical decisions.  pending schedule for left hip IM nailing  PT post op

## 2024-04-12 NOTE — PROGRESS NOTE ADULT - SUBJECTIVE AND OBJECTIVE BOX
This is in follow-up to my initial Neurology Consult Note of 4/10/24. Hx and findings as detailed therein.      Per radiology report of MRs of head, C-spine, T-spine, and L-spine:  "CONTRAST:    None    COMPARISON:   CT head 4/10/2023 and CT cervical and CT chest 4/9/2023    FINDINGS:    BRAIN    BRAIN:   The brain demonstrates small focal lesions of remote deep   infarction. The most conspicuous are noted in the external capsules. No   cerebral cortical lesion is recognized.   No diffusion restriction is   found in the brain.  No acute cerebral cortical infarct is found.   No   intracranial hemorrhage is recognized.  No mass effect is found in the   brain.   The brain also demonstrates patchy indistinct lesions within the   deep cerebral hemispheric white matter typical for ischemic white matter   disease.    CSF SPACES:   The ventricles, sulci and basal cisterns appear moderately   dilated reflecting diffuse brain volume loss.    VESSELS:   The vertebral and internal carotid arteries demonstrate   expected flow voids indicating their patency.    HEAD AND NECK STRUCTURES:   The orbits are unremarkable.  Paranasal   sinuses are clear.  The nasal cavity demonstrates left-to-right septal   deviation. Left middle turbinate tristen bullosa is present..  The central   skull base appears intact.  The nasopharynx is symmetric.  The temporal   bones appear clear of disease.  The calvarium appears unremarkable.    CERVICAL SPINE    Cervical vertebral alignment is significant for grade 1 anterior   listhesis C4 on C5, also slight retrolisthesis C5 on C6.   Facet joints   appear aligned.   Cervical vertebral body heights are maintained.   Cervical vertebral marrow signal intensity mildly heterogeneous from   degenerative endplate changes. These include small marginal osteophytes   most prominently at C5-C6.  No osseous expansion or epidural disease is   found.  No destructive bone lesion is found.   There are osteoarthritic   changes at the articulation of the anterior ring of C1 and the odontoid   process of C2.  This arthropathy includes marginal osteophytes,   subchondral sclerosis and joint space narrowing.    Cervical intervertebral disc spaces demonstrate a generalized pattern of   degeneration characterized by signal intensity loss on the long TR   images. Degenerative disc heightloss appears greatest at C5-C6.   Evaluation of degenerative disc disease is limited in the absence of   axial images.   However, no degenerative high-grade central canal   compromise is recognized.  Neural foramina appear narrowed by disc   materialand uncovertebral joint osteophytes most prominently at the   lower cervical intervertebral disc levels..    Cervical cord morphology Is preserved.  The cervical cord maintains   intact signal intensity   No focal intrinsic cord lesion is identified.   No cord expansion or cord volume loss is recognized.    The visualized adjacent neck structures appear intact.  No neck mass is   recognized.  Paraspinal soft tissues appear intact.  Visualized lymph   nodes appear to be within physiologic size limits.    THORACIC SPINE    Thoracic vertebral alignment demonstrates exaggeration of the mid   thoracic kyphosis. This vertebral malalignment has an apex at the T7   inferior endplate compression fracture. Approximately 70% height loss   results at this level. More limited compression fractures involve the T6   inferior endplate and the T9 superior endplate. Schmorl's nodes result.   More limited superior endplate deformity of T5 and T11. The remaining   thoracic vertebral body heights are maintained. Superior endplate   deformities are also present at L1-L2 and L4. At the L2 vertebral body   burst fracture pattern is present with impression upon the ventral thecal   sac but does not appear to compromise the cauda equina. At the thoracic   vertebral fracture is no high-grade central canal compromise is   recognized. Thoracic vertebral marrow signal intensity relatively   homogeneous without recognition of significant marrow edema.  No   destructive bone lesion, osseous expansion or epidural disease is found.    The paraspinal soft tissues appear intact.    Thoracic intervertebral disc spaces demonstrate a generalized pattern of   at least mild degeneration with signal intensity loss on the long TR   images. Disc heights are largely maintained.   No high-grade degenerative   central canal compromise is recognized. Thoracic neural foramina appear   intact.    The thoracic cord morphology is preserved.  The thoracic cord maintains   intact signal intensity.      IMPRESSION:    1. BRAIN:   Basal ganglia lacunar infarctions. Ischemic white matter   disease and atrophy typical for age    2. CERVICAL SPINE:   Grade 1 anterolisthesis C4 on C5. Low-grade cervical   degenerative disc disease. Cervical cord appears intact.    3. THORACIC SPINE:   Exaggeration in mid thoracic kyphosis with multiple   thoracic endplate deformities that are age indeterminate but felt likely   to be long-standing. Thoracic cord appears intact.    4. Only limited incomplete examinations could be performed on this date.   Recommend completion scans once tolerated by the patient."                    This is in follow-up to my initial Neurology Consult Note of 4/10/24. Hx and findings as detailed therein.      Per radiology report of MRs of head, C-spine, and T-spine:  "CONTRAST:    None    COMPARISON:   CT head 4/10/2023 and CT cervical and CT chest 4/9/2023    FINDINGS:    BRAIN    BRAIN:   The brain demonstrates small focal lesions of remote deep   infarction. The most conspicuous are noted in the external capsules. No   cerebral cortical lesion is recognized.   No diffusion restriction is   found in the brain.  No acute cerebral cortical infarct is found.   No   intracranial hemorrhage is recognized.  No mass effect is found in the   brain.   The brain also demonstrates patchy indistinct lesions within the   deep cerebral hemispheric white matter typical for ischemic white matter   disease.    CSF SPACES:   The ventricles, sulci and basal cisterns appear moderately   dilated reflecting diffuse brain volume loss.    VESSELS:   The vertebral and internal carotid arteries demonstrate   expected flow voids indicating their patency.    HEAD AND NECK STRUCTURES:   The orbits are unremarkable.  Paranasal   sinuses are clear.  The nasal cavity demonstrates left-to-right septal   deviation. Left middle turbinate tristen bullosa is present..  The central   skull base appears intact.  The nasopharynx is symmetric.  The temporal   bones appear clear of disease.  The calvarium appears unremarkable.    CERVICAL SPINE    Cervical vertebral alignment is significant for grade 1 anterior   listhesis C4 on C5, also slight retrolisthesis C5 on C6.   Facet joints   appear aligned.   Cervical vertebral body heights are maintained.   Cervical vertebral marrow signal intensity mildly heterogeneous from   degenerative endplate changes. These include small marginal osteophytes   most prominently at C5-C6.  No osseous expansion or epidural disease is   found.  No destructive bone lesion is found.   There are osteoarthritic   changes at the articulation of the anterior ring of C1 and the odontoid   process of C2.  This arthropathy includes marginal osteophytes,   subchondral sclerosis and joint space narrowing.    Cervical intervertebral disc spaces demonstrate a generalized pattern of   degeneration characterized by signal intensity loss on the long TR   images. Degenerative disc heightloss appears greatest at C5-C6.   Evaluation of degenerative disc disease is limited in the absence of   axial images.   However, no degenerative high-grade central canal   compromise is recognized.  Neural foramina appear narrowed by disc   materialand uncovertebral joint osteophytes most prominently at the   lower cervical intervertebral disc levels..    Cervical cord morphology Is preserved.  The cervical cord maintains   intact signal intensity   No focal intrinsic cord lesion is identified.   No cord expansion or cord volume loss is recognized.    The visualized adjacent neck structures appear intact.  No neck mass is   recognized.  Paraspinal soft tissues appear intact.  Visualized lymph   nodes appear to be within physiologic size limits.    THORACIC SPINE    Thoracic vertebral alignment demonstrates exaggeration of the mid   thoracic kyphosis. This vertebral malalignment has an apex at the T7   inferior endplate compression fracture. Approximately 70% height loss   results at this level. More limited compression fractures involve the T6   inferior endplate and the T9 superior endplate. Schmorl's nodes result.   More limited superior endplate deformity of T5 and T11. The remaining   thoracic vertebral body heights are maintained. Superior endplate   deformities are also present at L1-L2 and L4. At the L2 vertebral body   burst fracture pattern is present with impression upon the ventral thecal   sac but does not appear to compromise the cauda equina. At the thoracic   vertebral fracture is no high-grade central canal compromise is   recognized. Thoracic vertebral marrow signal intensity relatively   homogeneous without recognition of significant marrow edema.  No   destructive bone lesion, osseous expansion or epidural disease is found.    The paraspinal soft tissues appear intact.    Thoracic intervertebral disc spaces demonstrate a generalized pattern of   at least mild degeneration with signal intensity loss on the long TR   images. Disc heights are largely maintained.   No high-grade degenerative   central canal compromise is recognized. Thoracic neural foramina appear   intact.    The thoracic cord morphology is preserved.  The thoracic cord maintains   intact signal intensity.      IMPRESSION:    1. BRAIN:   Basal ganglia lacunar infarctions. Ischemic white matter   disease and atrophy typical for age    2. CERVICAL SPINE:   Grade 1 anterolisthesis C4 on C5. Low-grade cervical   degenerative disc disease. Cervical cord appears intact.    3. THORACIC SPINE:   Exaggeration in mid thoracic kyphosis with multiple   thoracic endplate deformities that are age indeterminate but felt likely   to be long-standing. Thoracic cord appears intact.    4. Only limited incomplete examinations could be performed on this date.   Recommend completion scans once tolerated by the patient."                    This is in follow-up to my initial Neurology Consult Note of 4/10/24. Hx and findings as detailed therein.      Per radiology report of MRs of head, C-spine, and T-spine:  "CONTRAST:    None    COMPARISON:   CT head 4/10/2023 and CT cervical and CT chest 4/9/2023    FINDINGS:    BRAIN    BRAIN:   The brain demonstrates small focal lesions of remote deep   infarction. The most conspicuous are noted in the external capsules. No   cerebral cortical lesion is recognized.   No diffusion restriction is   found in the brain.  No acute cerebral cortical infarct is found.   No   intracranial hemorrhage is recognized.  No mass effect is found in the   brain.   The brain also demonstrates patchy indistinct lesions within the   deep cerebral hemispheric white matter typical for ischemic white matter   disease.    CSF SPACES:   The ventricles, sulci and basal cisterns appear moderately   dilated reflecting diffuse brain volume loss.    VESSELS:   The vertebral and internal carotid arteries demonstrate   expected flow voids indicating their patency.    HEAD AND NECK STRUCTURES:   The orbits are unremarkable.  Paranasal   sinuses are clear.  The nasal cavity demonstrates left-to-right septal   deviation. Left middle turbinate tristen bullosa is present..  The central   skull base appears intact.  The nasopharynx is symmetric.  The temporal   bones appear clear of disease.  The calvarium appears unremarkable.    CERVICAL SPINE    Cervical vertebral alignment is significant for grade 1 anterior   listhesis C4 on C5, also slight retrolisthesis C5 on C6.   Facet joints   appear aligned.   Cervical vertebral body heights are maintained.   Cervical vertebral marrow signal intensity mildly heterogeneous from   degenerative endplate changes. These include small marginal osteophytes   most prominently at C5-C6.  No osseous expansion or epidural disease is   found.  No destructive bone lesion is found.   There are osteoarthritic   changes at the articulation of the anterior ring of C1 and the odontoid   process of C2.  This arthropathy includes marginal osteophytes,   subchondral sclerosis and joint space narrowing.    Cervical intervertebral disc spaces demonstrate a generalized pattern of   degeneration characterized by signal intensity loss on the long TR   images. Degenerative disc heightloss appears greatest at C5-C6.   Evaluation of degenerative disc disease is limited in the absence of   axial images.   However, no degenerative high-grade central canal   compromise is recognized.  Neural foramina appear narrowed by disc   materialand uncovertebral joint osteophytes most prominently at the   lower cervical intervertebral disc levels..    Cervical cord morphology Is preserved.  The cervical cord maintains   intact signal intensity   No focal intrinsic cord lesion is identified.   No cord expansion or cord volume loss is recognized.    The visualized adjacent neck structures appear intact.  No neck mass is   recognized.  Paraspinal soft tissues appear intact.  Visualized lymph   nodes appear to be within physiologic size limits.    THORACIC SPINE    Thoracic vertebral alignment demonstrates exaggeration of the mid   thoracic kyphosis. This vertebral malalignment has an apex at the T7   inferior endplate compression fracture. Approximately 70% height loss   results at this level. More limited compression fractures involve the T6   inferior endplate and the T9 superior endplate. Schmorl's nodes result.   More limited superior endplate deformity of T5 and T11. The remaining   thoracic vertebral body heights are maintained. Superior endplate   deformities are also present at L1-L2 and L4. At the L2 vertebral body   burst fracture pattern is present with impression upon the ventral thecal   sac but does not appear to compromise the cauda equina. At the thoracic   vertebral fracture is no high-grade central canal compromise is   recognized. Thoracic vertebral marrow signal intensity relatively   homogeneous without recognition of significant marrow edema.  No   destructive bone lesion, osseous expansion or epidural disease is found.    The paraspinal soft tissues appear intact.    Thoracic intervertebral disc spaces demonstrate a generalized pattern of   at least mild degeneration with signal intensity loss on the long TR   images. Disc heights are largely maintained.   No high-grade degenerative   central canal compromise is recognized. Thoracic neural foramina appear   intact.    The thoracic cord morphology is preserved.  The thoracic cord maintains   intact signal intensity.      IMPRESSION:    1. BRAIN:   Basal ganglia lacunar infarctions. Ischemic white matter   disease and atrophy typical for age    2. CERVICAL SPINE:   Grade 1 anterolisthesis C4 on C5. Low-grade cervical   degenerative disc disease. Cervical cord appears intact.    3. THORACIC SPINE:   Exaggeration in mid thoracic kyphosis with multiple   thoracic endplate deformities that are age indeterminate but felt likely   to be long-standing. Thoracic cord appears intact.    4. Only limited incomplete examinations could be performed on this date.   Recommend completion scans once tolerated by the patient."        Results of previously recommended lab tests:    ESR/CRP  9/64   RF  10  (borderline +)  CCP  not ordered?   SPEP  pattern pending   acetylcholine receptor antibodies  (binding abs were ordered - result pending; blocking and receptor abs were apparently not ordered).,   serum immunofixation   no monoclonal band  urine immunofixation   specimen not yet obtained  B12/SERUm folate (NOT RBC folic acid)  1753/9.4  methylmalonic acid/homocysteine  pending/pending   TSH  5.92  5.38   free thyroxine 1.5   CK  213  157   KJ  pending   copper  pending   zinc  pending.            This is in follow-up to my initial Neurology Consult Note of 4/10/24. Hx and findings as detailed therein.      Per radiology report of MRs of head, C-spine, and T-spine:  "CONTRAST:    None    COMPARISON:   CT head 4/10/2023 and CT cervical and CT chest 4/9/2023    FINDINGS:    BRAIN    BRAIN:   The brain demonstrates small focal lesions of remote deep   infarction. The most conspicuous are noted in the external capsules. No   cerebral cortical lesion is recognized.   No diffusion restriction is   found in the brain.  No acute cerebral cortical infarct is found.   No   intracranial hemorrhage is recognized.  No mass effect is found in the   brain.   The brain also demonstrates patchy indistinct lesions within the   deep cerebral hemispheric white matter typical for ischemic white matter   disease.    CSF SPACES:   The ventricles, sulci and basal cisterns appear moderately   dilated reflecting diffuse brain volume loss.    VESSELS:   The vertebral and internal carotid arteries demonstrate   expected flow voids indicating their patency.    HEAD AND NECK STRUCTURES:   The orbits are unremarkable.  Paranasal   sinuses are clear.  The nasal cavity demonstrates left-to-right septal   deviation. Left middle turbinate tristen bullosa is present..  The central   skull base appears intact.  The nasopharynx is symmetric.  The temporal   bones appear clear of disease.  The calvarium appears unremarkable.    CERVICAL SPINE    Cervical vertebral alignment is significant for grade 1 anterior   listhesis C4 on C5, also slight retrolisthesis C5 on C6.   Facet joints   appear aligned.   Cervical vertebral body heights are maintained.   Cervical vertebral marrow signal intensity mildly heterogeneous from   degenerative endplate changes. These include small marginal osteophytes   most prominently at C5-C6.  No osseous expansion or epidural disease is   found.  No destructive bone lesion is found.   There are osteoarthritic   changes at the articulation of the anterior ring of C1 and the odontoid   process of C2.  This arthropathy includes marginal osteophytes,   subchondral sclerosis and joint space narrowing.    Cervical intervertebral disc spaces demonstrate a generalized pattern of   degeneration characterized by signal intensity loss on the long TR   images. Degenerative disc heightloss appears greatest at C5-C6.   Evaluation of degenerative disc disease is limited in the absence of   axial images.   However, no degenerative high-grade central canal   compromise is recognized.  Neural foramina appear narrowed by disc   materialand uncovertebral joint osteophytes most prominently at the   lower cervical intervertebral disc levels..    Cervical cord morphology Is preserved.  The cervical cord maintains   intact signal intensity   No focal intrinsic cord lesion is identified.   No cord expansion or cord volume loss is recognized.    The visualized adjacent neck structures appear intact.  No neck mass is   recognized.  Paraspinal soft tissues appear intact.  Visualized lymph   nodes appear to be within physiologic size limits.    THORACIC SPINE    Thoracic vertebral alignment demonstrates exaggeration of the mid   thoracic kyphosis. This vertebral malalignment has an apex at the T7   inferior endplate compression fracture. Approximately 70% height loss   results at this level. More limited compression fractures involve the T6   inferior endplate and the T9 superior endplate. Schmorl's nodes result.   More limited superior endplate deformity of T5 and T11. The remaining   thoracic vertebral body heights are maintained. Superior endplate   deformities are also present at L1-L2 and L4. At the L2 vertebral body   burst fracture pattern is present with impression upon the ventral thecal   sac but does not appear to compromise the cauda equina. At the thoracic   vertebral fracture is no high-grade central canal compromise is   recognized. Thoracic vertebral marrow signal intensity relatively   homogeneous without recognition of significant marrow edema.  No   destructive bone lesion, osseous expansion or epidural disease is found.    The paraspinal soft tissues appear intact.    Thoracic intervertebral disc spaces demonstrate a generalized pattern of   at least mild degeneration with signal intensity loss on the long TR   images. Disc heights are largely maintained.   No high-grade degenerative   central canal compromise is recognized. Thoracic neural foramina appear   intact.    The thoracic cord morphology is preserved.  The thoracic cord maintains   intact signal intensity.      IMPRESSION:    1. BRAIN:   Basal ganglia lacunar infarctions. Ischemic white matter   disease and atrophy typical for age    2. CERVICAL SPINE:   Grade 1 anterolisthesis C4 on C5. Low-grade cervical   degenerative disc disease. Cervical cord appears intact.    3. THORACIC SPINE:   Exaggeration in mid thoracic kyphosis with multiple   thoracic endplate deformities that are age indeterminate but felt likely   to be long-standing. Thoracic cord appears intact.    4. Only limited incomplete examinations could be performed on this date.   Recommend completion scans once tolerated by the patient."      I reviewed the imaging studies.    Results of previously recommended lab tests:    ESR/CRP  9/64   RF  10  (borderline +)  CCP  not ordered?   SPEP  pattern pending   acetylcholine receptor antibodies  (binding abs were ordered - result pending; blocking and receptor abs were apparently not ordered).,   serum immunofixation   no monoclonal band  urine immunofixation   specimen not yet obtained  B12/SERUm folate (NOT RBC folic acid)  1753/9.4  methylmalonic acid/homocysteine  pending/pending   TSH  5.92  5.38   free thyroxine 1.5   CK  213  157   KJ  pending   copper  pending   zinc  pending.

## 2024-04-12 NOTE — PROGRESS NOTE ADULT - SUBJECTIVE AND OBJECTIVE BOX
NP Note discussed with  Primary Attending    Patient is a 73y old  Female who presents with a chief complaint of L hip fracture (12 Apr 2024 13:47)      INTERVAL HPI/OVERNIGHT EVENTS:  73-year-old female from home ambulates with a walker at baseline with PMHx of HTN, COPD (not on O2 at home), current smoker with 60 ppy hx, hypothyroidism, spinal stenosis, urinary incontinence, presenting with left-sided lower back and hip pain after fall 2 weeks ago. CTH and cervical spine shows No gross acute intracranial hemorrhage. CT head no acute intracranial hemorrhage. CT pelvis shows Acute proximal left femoral fracture. Pt is admitted for L hip fracture and COPD exacerbation 2/2 PNA.  Ortho following, recommending left hip IM nailing. Pt is not agreeable for the procedure at this time, will re-visit this discussion as per ortho. Also, there was a concern for slurred speech and possible stroke. Neuro following, recommending adequate imaging evaluation of her spine, especially the cervical spine, should precede operation (precede getting intubated for respiratory support). As per pt and family, she is allergic to contrast dye (unable to recall the reaction) hence CT scan with contrast is unable to perform. Pt recently had MR of lumbar spine non con (3 weeks ago). At this time, we will proceed with MR head C/T-spine non contrast as per neuro recs.  Nephro consulted for hyponatremia and pulmonology for COPD exacerbation    Spoke with daughter Sallie,  and patient in lengthy, updated about the plan of care, answered all questions. Pt is complaining of generalized pain, pain mgt following. Spoke with neuro about the MR results, as per neuro, there is nothing from the imaging that precludes an orthopedic procedure. Ortho made aware, will F/U when the procedure will be scheduled. Discussed with primary attending, Dr Muro    MEDICATIONS  (STANDING):  acetaminophen     Tablet .. 1000 milliGRAM(s) Oral every 8 hours  albuterol/ipratropium for Nebulization 3 milliLiter(s) Nebulizer every 6 hours  budesonide 160 MICROgram(s)/formoterol 4.5 MICROgram(s) Inhaler 2 Puff(s) Inhalation two times a day  chlorhexidine 2% Cloths 1 Application(s) Topical <User Schedule>  gabapentin 100 milliGRAM(s) Oral every 8 hours  heparin   Injectable 5000 Unit(s) SubCutaneous every 8 hours  levothyroxine 112 MICROGram(s) Oral daily  lidocaine   4% Patch 1 Patch Transdermal daily  lidocaine   4% Patch 2 Patch Transdermal daily  losartan 25 milliGRAM(s) Oral daily  naloxone Injectable 0.4 milliGRAM(s) IV Push once  nicotine -   7 mG/24Hr(s) Patch 1 Patch Transdermal daily  piperacillin/tazobactam IVPB.. 3.375 Gram(s) IV Intermittent every 8 hours  polyethylene glycol 3350 17 Gram(s) Oral daily  predniSONE   Tablet 40 milliGRAM(s) Oral daily  senna 2 Tablet(s) Oral at bedtime    MEDICATIONS  (PRN):  albuterol    90 MICROgram(s) HFA Inhaler 2 Puff(s) Inhalation every 6 hours PRN Bronchospasm  ALPRAZolam 1 milliGRAM(s) Oral every 12 hours PRN Anxiety  benzonatate 100 milliGRAM(s) Oral every 8 hours PRN Cough  bisacodyl 5 milliGRAM(s) Oral daily PRN Constipation  cyclobenzaprine 5 milliGRAM(s) Oral every 8 hours PRN Spasm  oxyCODONE    IR 5 milliGRAM(s) Oral every 4 hours PRN Moderate Pain (4 - 6)  oxyCODONE    IR 10 milliGRAM(s) Oral every 4 hours PRN Severe Pain (7 - 10)      __________________________________________________  REVIEW OF SYSTEMS:  " not too good"  CONSTITUTIONAL: No fever,   EYES: no acute visual disturbances  NECK: No pain or stiffness  RESPIRATORY: No cough; No shortness of breath  CARDIOVASCULAR: No chest pain, no palpitations  GASTROINTESTINAL: No pain. No nausea or vomiting; No diarrhea   NEUROLOGICAL: No headache or numbness, no tremors  MUSCULOSKELETAL: No joint pain, no muscle pain  GENITOURINARY: no dysuria, no frequency, no hesitancy  ALL OTHER  ROS negative        Vital Signs Last 24 Hrs  T(C): 36.7 (12 Apr 2024 06:11), Max: 36.7 (11 Apr 2024 20:54)  T(F): 98 (12 Apr 2024 06:11), Max: 98 (11 Apr 2024 20:54)  HR: 90 (12 Apr 2024 00:27) (90 - 90)  BP: 159/82 (12 Apr 2024 06:11) (130/79 - 159/82)  BP(mean): 107 (12 Apr 2024 06:11) (96 - 107)  RR: 20 (12 Apr 2024 06:11) (18 - 20)  SpO2: 94% (12 Apr 2024 06:11) (94% - 97%)    Parameters below as of 12 Apr 2024 06:11  Patient On (Oxygen Delivery Method): nasal cannula  O2 Flow (L/min): 2      ________________________________________________  PHYSICAL EXAM:  GENERAL: NAD, frail  HEENT: Normocephalic; conjunctivae and sclerae clear; moist mucous membranes;   NECK : supple  CHEST/LUNG: Diminished   HEART: S1 S2  regular;  ABDOMEN: Soft, Nontender, Nondistended; Bowel sounds present  EXTREMITIES: no cyanosis; no edema; no calf tenderness  SKIN: warm and dry; no rash  NERVOUS SYSTEM:  Awake and alert; Oriented to place, person and time    _________________________________________________  LABS:                        11.6   8.42  )-----------( 188      ( 12 Apr 2024 07:37 )             34.9     04-12    137  |  100  |  8   ----------------------------<  140<H>  3.1<L>   |  31  |  0.40<L>    Ca    8.1<L>      12 Apr 2024 07:37  Phos  2.5     04-12  Mg     2.0     04-12    TPro  5.9<L>  /  Alb  2.6<L>  /  TBili  0.5  /  DBili  x   /  AST  21  /  ALT  19  /  AlkPhos  78  04-12      Urinalysis Basic - ( 12 Apr 2024 07:37 )    Color: x / Appearance: x / SG: x / pH: x  Gluc: 140 mg/dL / Ketone: x  / Bili: x / Urobili: x   Blood: x / Protein: x / Nitrite: x   Leuk Esterase: x / RBC: x / WBC x   Sq Epi: x / Non Sq Epi: x / Bacteria: x      CAPILLARY BLOOD GLUCOSE            RADIOLOGY & ADDITIONAL TESTS:  < from: MR Thoracic Spine No Cont (04.11.24 @ 19:15) >    ACC: [id number] EXAM:  MR BRAIN   ORDERED BY: TAMMI CHRISTIE     ACC: [id number] EXAM:  MR SPINE THORACIC   ORDERED BY: TAMMI CHRISTIE     ACC: [id number] EXAM:  MR SPINE CERVICAL   ORDERED BY: TAMMI CHRISTIE     PROCEDURE DATE:  04/11/2024          INTERPRETATION:  Three examinations were performed in this patient:  1.  MR of the brain without gadolinium contrast  2.  MR cervical spine without gadolinium contrast  3.  MR thoracic spine without gadolinium contrast    CLINICAL INFORMATION: Post fall trauma cord compression  HMR  Admitting   Dxs: S72.009A FRACTURE OF UNSP PART OF NECK OF UNSP FEMUR, INIT    TECHNIQUE:  1. MR brain:  Sagittal and axial T1-weighted images, axial FLAIR images,   axial susceptibility weighted images, axial T2-weighted images and axial   diffusion weighted images of the brain were obtained.  2.  MR cervical spine:   Sagittal T2-weighted images were obtained.  3.  MR thoracic spine:   Sagittal T2-weighted images were obtained.  The remainder these examinations could not be completed, not tolerated by   the patient. The images that could be obtained are degraded by patient   motion.  CONTRAST:    None    COMPARISON:   CT head 4/10/2023 and CT cervical and CT chest 4/9/2023    FINDINGS:    BRAIN    BRAIN:   The brain demonstrates small focal lesions of remote deep   infarction. The most conspicuous are noted in the external capsules. No   cerebral cortical lesion is recognized.   No diffusion restriction is   found in the brain.  No acute cerebral cortical infarct is found.   No   intracranial hemorrhage is recognized.  No mass effect is found in the   brain.   The brain also demonstrates patchy indistinct lesions within the   deep cerebral hemispheric white matter typical for ischemic white matter   disease.    CSF SPACES:   The ventricles, sulci and basal cisterns appear moderately   dilated reflecting diffuse brain volume loss.    VESSELS:   The vertebral and internal carotid arteries demonstrate   expected flow voids indicating their patency.    HEAD AND NECK STRUCTURES:   The orbits are unremarkable.  Paranasal   sinuses are clear.  The nasal cavity demonstrates left-to-right septal   deviation. Left middle turbinate tristen bullosa is present..  The central   skull base appears intact.  The nasopharynx is symmetric.  The temporal   bones appear clear of disease.  The calvarium appears unremarkable.    CERVICAL SPINE    Cervical vertebral alignment is significant for grade 1 anterior   listhesis C4 on C5, also slight retrolisthesis C5 on C6.   Facet joints   appear aligned.   Cervical vertebral body heights are maintained.   Cervical vertebral marrow signal intensity mildly heterogeneous from   degenerative endplate changes. These include small marginal osteophytes   most prominently at C5-C6.  No osseous expansion or epidural disease is   found.  No destructive bone lesion is found.   There are osteoarthritic   changes at the articulation of the anterior ring of C1 and the odontoid   process of C2.  This arthropathy includes marginal osteophytes,   subchondral sclerosis and joint space narrowing.    Cervical intervertebral disc spaces demonstrate a generalized pattern of   degeneration characterized by signal intensity loss on the long TR   images. Degenerative disc heightloss appears greatest at C5-C6.   Evaluation of degenerative disc disease is limited in the absence of   axial images.   However, no degenerative high-grade central canal   compromise is recognized.  Neural foramina appear narrowed by disc   materialand uncovertebral joint osteophytes most prominently at the   lower cervical intervertebral disc levels..    Cervical cord morphology Is preserved.  The cervical cord maintains   intact signal intensity   No focal intrinsic cord lesion is identified.   No cord expansion or cord volume loss is recognized.    The visualized adjacent neck structures appear intact.  No neck mass is   recognized.  Paraspinal soft tissues appear intact.  Visualized lymph   nodes appear to be within physiologic size limits.    THORACIC SPINE    Thoracic vertebral alignment demonstrates exaggeration of the mid   thoracic kyphosis. This vertebral malalignment has an apex at the T7   inferior endplate compression fracture. Approximately 70% height loss   results at this level. More limited compression fractures involve the T6   inferior endplate and the T9 superior endplate. Schmorl's nodes result.   More limited superior endplate deformity of T5 and T11. The remaining   thoracic vertebral body heights are maintained. Superior endplate   deformities are also present at L1-L2 and L4. At the L2 vertebral body   burst fracture pattern is present with impression upon the ventral thecal   sac but does not appear to compromise the cauda equina. At the thoracic   vertebral fracture is no high-grade central canal compromise is   recognized. Thoracic vertebral marrow signal intensity relatively   homogeneous without recognition of significant marrow edema.  No   destructive bone lesion, osseous expansion or epidural disease is found.    The paraspinal soft tissues appear intact.    Thoracic intervertebral disc spaces demonstrate a generalized pattern of   at least mild degeneration with signal intensity loss on the long TR   images. Disc heights are largely maintained.   No high-grade degenerative   central canal compromise is recognized. Thoracic neural foramina appear   intact.    The thoracic cord morphology is preserved.  The thoracic cord maintains   intact signal intensity.      IMPRESSION:    1. BRAIN:   Basal ganglia lacunar infarctions. Ischemic white matter   disease and atrophy typical for age    2. CERVICAL SPINE:   Grade 1 anterolisthesis C4 on C5. Low-grade cervical   degenerative disc disease. Cervical cord appears intact.    3. THORACIC SPINE:   Exaggeration in mid thoracic kyphosis with multiple   thoracic endplate deformities that are age indeterminate but felt likely   to be long-standing. Thoracic cord appears intact.    4. Only limited incomplete examinations could be performed on this date.   Recommend completion scans once tolerated by the patient.    --- End of Report ---            GRAZYNA CRAWLEY MD; Attending Radiologist  This document has been electronically signed. Apr 11 2024  8:02PM    < end of copied text >    Imaging Personally Reviewed:  YES/NO    Consultant(s) Notes Reviewed:   YES/ No    Care Discussed with Consultants :     Plan of care was discussed with patient and /or primary care giver; all questions and concerns were addressed and care was aligned with patient's wishes.

## 2024-04-13 ENCOUNTER — TRANSCRIPTION ENCOUNTER (OUTPATIENT)
Age: 74
End: 2024-04-13

## 2024-04-13 LAB
% ALBUMIN: 51.1 % — SIGNIFICANT CHANGE UP
% ALPHA 1: 7.5 % — SIGNIFICANT CHANGE UP
% ALPHA 2: 9.6 % — SIGNIFICANT CHANGE UP
% BETA: 12.8 % — SIGNIFICANT CHANGE UP
% GAMMA: 19 % — SIGNIFICANT CHANGE UP
24R-OH-CALCIDIOL SERPL-MCNC: 35.4 NG/ML — SIGNIFICANT CHANGE UP (ref 30–80)
ALBUMIN SERPL ELPH-MCNC: 3 G/DL — LOW (ref 3.5–5)
ALBUMIN SERPL ELPH-MCNC: 3.1 G/DL — LOW (ref 3.6–5.5)
ALBUMIN/GLOB SERPL ELPH: 1.1 RATIO — SIGNIFICANT CHANGE UP
ALP SERPL-CCNC: 108 U/L — SIGNIFICANT CHANGE UP (ref 40–120)
ALPHA1 GLOB SERPL ELPH-MCNC: 0.4 G/DL — SIGNIFICANT CHANGE UP (ref 0.1–0.4)
ALPHA2 GLOB SERPL ELPH-MCNC: 0.6 G/DL — SIGNIFICANT CHANGE UP (ref 0.5–1)
ALT FLD-CCNC: 20 U/L DA — SIGNIFICANT CHANGE UP (ref 10–60)
ANION GAP SERPL CALC-SCNC: 7 MMOL/L — SIGNIFICANT CHANGE UP (ref 5–17)
AST SERPL-CCNC: 24 U/L — SIGNIFICANT CHANGE UP (ref 10–40)
B-GLOBULIN SERPL ELPH-MCNC: 0.8 G/DL — SIGNIFICANT CHANGE UP (ref 0.5–1)
BASOPHILS # BLD AUTO: 0.02 K/UL — SIGNIFICANT CHANGE UP (ref 0–0.2)
BASOPHILS NFR BLD AUTO: 0.2 % — SIGNIFICANT CHANGE UP (ref 0–2)
BILIRUB SERPL-MCNC: 0.7 MG/DL — SIGNIFICANT CHANGE UP (ref 0.2–1.2)
BUN SERPL-MCNC: 9 MG/DL — SIGNIFICANT CHANGE UP (ref 7–18)
CALCIUM SERPL-MCNC: 8.8 MG/DL — SIGNIFICANT CHANGE UP (ref 8.4–10.5)
CHLORIDE SERPL-SCNC: 99 MMOL/L — SIGNIFICANT CHANGE UP (ref 96–108)
CO2 SERPL-SCNC: 30 MMOL/L — SIGNIFICANT CHANGE UP (ref 22–31)
CREAT SERPL-MCNC: 0.42 MG/DL — LOW (ref 0.5–1.3)
EGFR: 103 ML/MIN/1.73M2 — SIGNIFICANT CHANGE UP
EOSINOPHIL # BLD AUTO: 0.23 K/UL — SIGNIFICANT CHANGE UP (ref 0–0.5)
EOSINOPHIL NFR BLD AUTO: 1.9 % — SIGNIFICANT CHANGE UP (ref 0–6)
GAMMA GLOBULIN: 1.1 G/DL — SIGNIFICANT CHANGE UP (ref 0.6–1.6)
GLUCOSE SERPL-MCNC: 143 MG/DL — HIGH (ref 70–99)
HCT VFR BLD CALC: 39.8 % — SIGNIFICANT CHANGE UP (ref 34.5–45)
HGB BLD-MCNC: 13.4 G/DL — SIGNIFICANT CHANGE UP (ref 11.5–15.5)
IMM GRANULOCYTES NFR BLD AUTO: 0.4 % — SIGNIFICANT CHANGE UP (ref 0–0.9)
INTERPRETATION SERPL IFE-IMP: SIGNIFICANT CHANGE UP
LYMPHOCYTES # BLD AUTO: 1.21 K/UL — SIGNIFICANT CHANGE UP (ref 1–3.3)
LYMPHOCYTES # BLD AUTO: 9.9 % — LOW (ref 13–44)
MAGNESIUM SERPL-MCNC: 2.3 MG/DL — SIGNIFICANT CHANGE UP (ref 1.6–2.6)
MCHC RBC-ENTMCNC: 31.5 PG — SIGNIFICANT CHANGE UP (ref 27–34)
MCHC RBC-ENTMCNC: 33.7 GM/DL — SIGNIFICANT CHANGE UP (ref 32–36)
MCV RBC AUTO: 93.6 FL — SIGNIFICANT CHANGE UP (ref 80–100)
MONOCYTES # BLD AUTO: 1.08 K/UL — HIGH (ref 0–0.9)
MONOCYTES NFR BLD AUTO: 8.8 % — SIGNIFICANT CHANGE UP (ref 2–14)
NEUTROPHILS # BLD AUTO: 9.69 K/UL — HIGH (ref 1.8–7.4)
NEUTROPHILS NFR BLD AUTO: 78.8 % — HIGH (ref 43–77)
NRBC # BLD: 0 /100 WBCS — SIGNIFICANT CHANGE UP (ref 0–0)
PHOSPHATE SERPL-MCNC: 2.4 MG/DL — LOW (ref 2.5–4.5)
PLATELET # BLD AUTO: 227 K/UL — SIGNIFICANT CHANGE UP (ref 150–400)
POTASSIUM SERPL-MCNC: 3.2 MMOL/L — LOW (ref 3.5–5.3)
POTASSIUM SERPL-SCNC: 3.2 MMOL/L — LOW (ref 3.5–5.3)
PROCALCITONIN SERPL-MCNC: 0.08 NG/ML — SIGNIFICANT CHANGE UP (ref 0.02–0.1)
PROT PATTERN SERPL ELPH-IMP: SIGNIFICANT CHANGE UP
PROT SERPL-MCNC: 6 G/DL — SIGNIFICANT CHANGE UP (ref 6–8.3)
PROT SERPL-MCNC: 7.1 G/DL — SIGNIFICANT CHANGE UP (ref 6–8.3)
RBC # BLD: 4.25 M/UL — SIGNIFICANT CHANGE UP (ref 3.8–5.2)
RBC # FLD: 12.9 % — SIGNIFICANT CHANGE UP (ref 10.3–14.5)
SODIUM SERPL-SCNC: 136 MMOL/L — SIGNIFICANT CHANGE UP (ref 135–145)
WBC # BLD: 12.28 K/UL — HIGH (ref 3.8–10.5)
WBC # FLD AUTO: 12.28 K/UL — HIGH (ref 3.8–10.5)

## 2024-04-13 RX ORDER — ACETAMINOPHEN 500 MG
1000 TABLET ORAL EVERY 8 HOURS
Refills: 0 | Status: DISCONTINUED | OUTPATIENT
Start: 2024-04-13 | End: 2024-04-14

## 2024-04-13 RX ORDER — LOSARTAN POTASSIUM 100 MG/1
50 TABLET, FILM COATED ORAL DAILY
Refills: 0 | Status: DISCONTINUED | OUTPATIENT
Start: 2024-04-14 | End: 2024-04-16

## 2024-04-13 RX ORDER — LOSARTAN POTASSIUM 100 MG/1
25 TABLET, FILM COATED ORAL ONCE
Refills: 0 | Status: COMPLETED | OUTPATIENT
Start: 2024-04-13 | End: 2024-04-13

## 2024-04-13 RX ORDER — SODIUM,POTASSIUM PHOSPHATES 278-250MG
1 POWDER IN PACKET (EA) ORAL ONCE
Refills: 0 | Status: COMPLETED | OUTPATIENT
Start: 2024-04-13 | End: 2024-04-13

## 2024-04-13 RX ORDER — POTASSIUM CHLORIDE 20 MEQ
40 PACKET (EA) ORAL ONCE
Refills: 0 | Status: COMPLETED | OUTPATIENT
Start: 2024-04-13 | End: 2024-04-13

## 2024-04-13 RX ADMIN — HEPARIN SODIUM 5000 UNIT(S): 5000 INJECTION INTRAVENOUS; SUBCUTANEOUS at 22:05

## 2024-04-13 RX ADMIN — Medication 1 MILLIGRAM(S): at 12:19

## 2024-04-13 RX ADMIN — Medication 1000 MILLIGRAM(S): at 06:47

## 2024-04-13 RX ADMIN — Medication 200 MILLIGRAM(S): at 11:12

## 2024-04-13 RX ADMIN — OXYCODONE HYDROCHLORIDE 5 MILLIGRAM(S): 5 TABLET ORAL at 20:08

## 2024-04-13 RX ADMIN — Medication 200 MILLIGRAM(S): at 00:32

## 2024-04-13 RX ADMIN — Medication 3 MILLILITER(S): at 14:43

## 2024-04-13 RX ADMIN — GABAPENTIN 100 MILLIGRAM(S): 400 CAPSULE ORAL at 13:12

## 2024-04-13 RX ADMIN — OXYCODONE HYDROCHLORIDE 5 MILLIGRAM(S): 5 TABLET ORAL at 03:18

## 2024-04-13 RX ADMIN — OXYCODONE HYDROCHLORIDE 5 MILLIGRAM(S): 5 TABLET ORAL at 06:50

## 2024-04-13 RX ADMIN — Medication 1000 MILLIGRAM(S): at 13:11

## 2024-04-13 RX ADMIN — OXYCODONE HYDROCHLORIDE 5 MILLIGRAM(S): 5 TABLET ORAL at 07:07

## 2024-04-13 RX ADMIN — LIDOCAINE 2 PATCH: 4 CREAM TOPICAL at 11:12

## 2024-04-13 RX ADMIN — OXYCODONE HYDROCHLORIDE 10 MILLIGRAM(S): 5 TABLET ORAL at 17:26

## 2024-04-13 RX ADMIN — CHLORHEXIDINE GLUCONATE 1 APPLICATION(S): 213 SOLUTION TOPICAL at 06:51

## 2024-04-13 RX ADMIN — Medication 3 MILLILITER(S): at 03:10

## 2024-04-13 RX ADMIN — PIPERACILLIN AND TAZOBACTAM 25 GRAM(S): 4; .5 INJECTION, POWDER, LYOPHILIZED, FOR SOLUTION INTRAVENOUS at 13:12

## 2024-04-13 RX ADMIN — LOSARTAN POTASSIUM 25 MILLIGRAM(S): 100 TABLET, FILM COATED ORAL at 06:48

## 2024-04-13 RX ADMIN — Medication 200 MILLIGRAM(S): at 23:23

## 2024-04-13 RX ADMIN — LIDOCAINE 2 PATCH: 4 CREAM TOPICAL at 17:26

## 2024-04-13 RX ADMIN — LIDOCAINE 2 PATCH: 4 CREAM TOPICAL at 23:00

## 2024-04-13 RX ADMIN — OXYCODONE HYDROCHLORIDE 10 MILLIGRAM(S): 5 TABLET ORAL at 00:32

## 2024-04-13 RX ADMIN — HEPARIN SODIUM 5000 UNIT(S): 5000 INJECTION INTRAVENOUS; SUBCUTANEOUS at 06:48

## 2024-04-13 RX ADMIN — PIPERACILLIN AND TAZOBACTAM 25 GRAM(S): 4; .5 INJECTION, POWDER, LYOPHILIZED, FOR SOLUTION INTRAVENOUS at 22:04

## 2024-04-13 RX ADMIN — Medication 1000 MILLIGRAM(S): at 08:00

## 2024-04-13 RX ADMIN — LIDOCAINE 1 PATCH: 4 CREAM TOPICAL at 07:07

## 2024-04-13 RX ADMIN — Medication 1 PATCH: at 08:03

## 2024-04-13 RX ADMIN — Medication 1000 MILLIGRAM(S): at 15:03

## 2024-04-13 RX ADMIN — Medication 3 MILLILITER(S): at 09:01

## 2024-04-13 RX ADMIN — LOSARTAN POTASSIUM 25 MILLIGRAM(S): 100 TABLET, FILM COATED ORAL at 15:45

## 2024-04-13 RX ADMIN — OXYCODONE HYDROCHLORIDE 5 MILLIGRAM(S): 5 TABLET ORAL at 19:08

## 2024-04-13 RX ADMIN — Medication 1 PATCH: at 08:00

## 2024-04-13 RX ADMIN — Medication 1 MILLIGRAM(S): at 23:23

## 2024-04-13 RX ADMIN — OXYCODONE HYDROCHLORIDE 5 MILLIGRAM(S): 5 TABLET ORAL at 08:01

## 2024-04-13 RX ADMIN — Medication 200 MILLIGRAM(S): at 06:48

## 2024-04-13 RX ADMIN — OXYCODONE HYDROCHLORIDE 10 MILLIGRAM(S): 5 TABLET ORAL at 07:07

## 2024-04-13 RX ADMIN — POLYETHYLENE GLYCOL 3350 17 GRAM(S): 17 POWDER, FOR SOLUTION ORAL at 11:12

## 2024-04-13 RX ADMIN — Medication 1000 MILLIGRAM(S): at 13:51

## 2024-04-13 RX ADMIN — Medication 40 MILLIGRAM(S): at 06:47

## 2024-04-13 RX ADMIN — BUDESONIDE AND FORMOTEROL FUMARATE DIHYDRATE 2 PUFF(S): 160; 4.5 AEROSOL RESPIRATORY (INHALATION) at 11:10

## 2024-04-13 RX ADMIN — Medication 1 PATCH: at 11:58

## 2024-04-13 RX ADMIN — Medication 1000 MILLIGRAM(S): at 07:07

## 2024-04-13 RX ADMIN — Medication 1 PATCH: at 17:27

## 2024-04-13 RX ADMIN — Medication 1 PATCH: at 08:01

## 2024-04-13 RX ADMIN — Medication 40 MILLIEQUIVALENT(S): at 12:19

## 2024-04-13 RX ADMIN — OXYCODONE HYDROCHLORIDE 10 MILLIGRAM(S): 5 TABLET ORAL at 22:14

## 2024-04-13 RX ADMIN — SENNA PLUS 2 TABLET(S): 8.6 TABLET ORAL at 22:05

## 2024-04-13 RX ADMIN — Medication 3 MILLILITER(S): at 20:39

## 2024-04-13 RX ADMIN — Medication 112 MICROGRAM(S): at 06:47

## 2024-04-13 RX ADMIN — BUDESONIDE AND FORMOTEROL FUMARATE DIHYDRATE 2 PUFF(S): 160; 4.5 AEROSOL RESPIRATORY (INHALATION) at 22:05

## 2024-04-13 RX ADMIN — PIPERACILLIN AND TAZOBACTAM 25 GRAM(S): 4; .5 INJECTION, POWDER, LYOPHILIZED, FOR SOLUTION INTRAVENOUS at 07:36

## 2024-04-13 RX ADMIN — OXYCODONE HYDROCHLORIDE 10 MILLIGRAM(S): 5 TABLET ORAL at 17:02

## 2024-04-13 RX ADMIN — LIDOCAINE 2 PATCH: 4 CREAM TOPICAL at 07:07

## 2024-04-13 RX ADMIN — Medication 1 PATCH: at 11:12

## 2024-04-13 RX ADMIN — OXYCODONE HYDROCHLORIDE 5 MILLIGRAM(S): 5 TABLET ORAL at 11:22

## 2024-04-13 RX ADMIN — Medication 1 PACKET(S): at 11:11

## 2024-04-13 RX ADMIN — Medication 200 MILLIGRAM(S): at 17:02

## 2024-04-13 RX ADMIN — OXYCODONE HYDROCHLORIDE 10 MILLIGRAM(S): 5 TABLET ORAL at 23:12

## 2024-04-13 RX ADMIN — OXYCODONE HYDROCHLORIDE 5 MILLIGRAM(S): 5 TABLET ORAL at 12:09

## 2024-04-13 RX ADMIN — HEPARIN SODIUM 5000 UNIT(S): 5000 INJECTION INTRAVENOUS; SUBCUTANEOUS at 13:11

## 2024-04-13 NOTE — CONSULT NOTE ADULT - SUBJECTIVE AND OBJECTIVE BOX
PATIENT SEEN AND EXAMINED BY SYD TRIPATHI M.D. ON :- 4/13/24  DATE OF SERVICE:         4/13/24    Interim events noted,Labs ,Radiological studies and Cardiology tests reviewed .    Patient is a 73y old  Female who presents with a chief complaint of L hip fracture (13 Apr 2024 16:07)      HPI:  73-year-old female from home ambulates with a walker at baseline with PMH of HTN, COPD ( not on O2 at home), current smoker with 60 ppy hx, hypothyroidism, spinal stenosis urinary incontinence, presenting with left-sided lower back and hip pain after fall 2 weeks ago. Approximately 1-2 weeks ago the patient fell while walking and her leg "gave out" and since then she has had left-sided leg pain.  Patient reports she is able to walk with her cane/walker but feels very weak and unbalanced and has since fallen 2 more times. Pt also complains of SOB. Patient reporting chest pain since the fall today after her cane hit her chest. Patient denies any headache, dizziness, cough, congestion, fever, chills, N,V,D,Abd pain, numbness or tingling. In the ED, pt is comfortable, but complains of pain, NAD, pt is wheezing b/l with R sided crackles, pt was found to be hypoxic on RA to 80s saturating well on 1-2 L NC, hypertensive 175/105, wbc 9, Na 130. CTH and cervical spine shows No gross acute intracranial hemorrhage. No gross acute fracture cervical spine. CTC shows Loss of height of multiple thoracic and lumbar spine vertebral bodies which are of indeterminate age. Dominant cluster of nodular and patchy opacities within the superior segment of the right lower lobe suggestive of infection with endobronchial spread. Emphysema. Intrahepatic and extrahepatic biliary ductal dilatation is of unclear etiology. CT pelvis shows Acute proximal left femoral fracture,  Age-indeterminate severe compression fracture of the L5 vertebral body. Pt is admitted for L hip fracture/ SOB 2/2 PNA vs COPD exacerbation.    Off note: pt states that she lost about 30 lbs in the last 2 months. She is undergoing colonoscopy o/p to evaluate for weight loss. pt denies any blood in the stool. However, she has family history of colon ca in her father.  (09 Apr 2024 19:22)      PAST MEDICAL & SURGICAL HISTORY:  HTN (hypertension)      Hypothyroidism      COPD (chronic obstructive pulmonary disease)      Spinal stenosis      Current smoker      H/O foot surgery          PREVIOUS DIAGNOSTIC TESTING:      ECHO  FINDINGS:    STRESS  FINDINGS:    CATHETERIZATION  FINDINGS:    MEDICATIONS  (STANDING):  acetaminophen     Tablet .. 1000 milliGRAM(s) Oral every 8 hours  albuterol/ipratropium for Nebulization 3 milliLiter(s) Nebulizer every 6 hours  budesonide 160 MICROgram(s)/formoterol 4.5 MICROgram(s) Inhaler 2 Puff(s) Inhalation two times a day  chlorhexidine 2% Cloths 1 Application(s) Topical <User Schedule>  gabapentin 100 milliGRAM(s) Oral every 8 hours  guaiFENesin Oral Liquid (Sugar-Free) 200 milliGRAM(s) Oral every 6 hours  heparin   Injectable 5000 Unit(s) SubCutaneous every 8 hours  levothyroxine 112 MICROGram(s) Oral daily  lidocaine   4% Patch 2 Patch Transdermal daily  naloxone Injectable 0.4 milliGRAM(s) IV Push once  nicotine -   7 mG/24Hr(s) Patch 1 Patch Transdermal daily  piperacillin/tazobactam IVPB.. 3.375 Gram(s) IV Intermittent every 8 hours  polyethylene glycol 3350 17 Gram(s) Oral daily  predniSONE   Tablet 40 milliGRAM(s) Oral daily  senna 2 Tablet(s) Oral at bedtime    MEDICATIONS  (PRN):  albuterol    90 MICROgram(s) HFA Inhaler 2 Puff(s) Inhalation every 6 hours PRN Bronchospasm  ALPRAZolam 1 milliGRAM(s) Oral every 12 hours PRN Anxiety  benzonatate 100 milliGRAM(s) Oral every 8 hours PRN Cough  bisacodyl 5 milliGRAM(s) Oral daily PRN Constipation  cyclobenzaprine 5 milliGRAM(s) Oral every 8 hours PRN Spasm  oxyCODONE    IR 5 milliGRAM(s) Oral every 4 hours PRN Moderate Pain (4 - 6)  oxyCODONE    IR 10 milliGRAM(s) Oral every 4 hours PRN Severe Pain (7 - 10)      FAMILY HISTORY:  FH: colon cancer (Father)        SOCIAL HISTORY:    CIGARETTES:    ALCOHOL:    REVIEW OF SYSTEMS:  CONSTITUTIONAL: No fever, weight loss, or fatigue  EYES: No eye pain, visual disturbances, or discharge  ENMT:  No difficulty hearing, tinnitus, vertigo; No sinus or throat pain  NECK: No pain or stiffness  RESPIRATORY: No cough, wheezing, chills or hemoptysis; No shortness of breath  CARDIOVASCULAR: No chest pain, palpitations, dizziness, or leg swelling  GASTROINTESTINAL: No abdominal or epigastric pain. No nausea, vomiting, or hematemesis; No diarrhea or constipation. No melena or hematochezia.  GENITOURINARY: No dysuria, frequency, hematuria, or incontinence  NEUROLOGICAL: No headaches, memory loss, loss of strength, numbness, or tremors  SKIN: No itching, burning, rashes, or lesions   LYMPH NODES: No enlarged glands  ENDOCRINE: No heat or cold intolerance; No hair loss  MUSCULOSKELETAL: No joint pain or swelling; No muscle, back, or extremity pain  PSYCHIATRIC: No depression, anxiety, mood swings, or difficulty sleeping  HEME/LYMPH: No easy bruising, or bleeding gums  ALLERY AND IMMUNOLOGIC: No hives or eczema    Vital Signs Last 24 Hrs  T(C): 36.8 (13 Apr 2024 14:27), Max: 36.9 (13 Apr 2024 05:14)  T(F): 98.2 (13 Apr 2024 14:27), Max: 98.4 (13 Apr 2024 05:14)  HR: 84 (13 Apr 2024 14:27) (84 - 87)  BP: 144/92 (13 Apr 2024 14:27) (144/92 - 169/96)  BP(mean): --  RR: 19 (13 Apr 2024 14:27) (19 - 20)  SpO2: 94% (13 Apr 2024 14:27) (93% - 94%)    Parameters below as of 13 Apr 2024 14:27  Patient On (Oxygen Delivery Method): room air          PHYSICAL EXAM:  GENERAL: NAD, well-groomed, well-developed  HEAD:  Atraumatic, Normocephalic  EYES: EOMI, PERRLA, conjunctiva and sclera clear  ENMT: No tonsillar erythema, exudates, or enlargement; Moist mucous membranes, Good dentition, No lesions  NECK: Supple, No JVD, Normal thyroid  NERVOUS SYSTEM:  Alert & Oriented X3, Good concentration; Motor Strength 5/5 B/L upper and lower extremities; DTRs 2+ intact and symmetric  CHEST/LUNG: Clear to percussion bilaterally; No rales, rhonchi, wheezing, or rubs  HEART: Regular rate and rhythm; No murmurs, rubs, or gallops  ABDOMEN: Soft, Nontender, Nondistended; Bowel sounds present  EXTREMITIES:  2+ Peripheral Pulses, No clubbing, cyanosis, or edema  LYMPH: No lymphadenopathy noted  SKIN: No rashes or lesions      INTERPRETATION OF TELEMETRY:    ECG:    CHADSVASC:     LABS:                        13.4   12.28 )-----------( 227      ( 13 Apr 2024 06:15 )             39.8     04-13    136  |  99  |  9   ----------------------------<  143<H>  3.2<L>   |  30  |  0.42<L>    Ca    8.8      13 Apr 2024 06:15  Phos  2.4     04-13  Mg     2.3     04-13    TPro  7.1  /  Alb  3.0<L>  /  TBili  0.7  /  DBili  x   /  AST  24  /  ALT  20  /  AlkPhos  108  04-13          Urinalysis Basic - ( 13 Apr 2024 06:15 )    Color: x / Appearance: x / SG: x / pH: x  Gluc: 143 mg/dL / Ketone: x  / Bili: x / Urobili: x   Blood: x / Protein: x / Nitrite: x   Leuk Esterase: x / RBC: x / WBC x   Sq Epi: x / Non Sq Epi: x / Bacteria: x      Lipid Panel:   I&O's Summary    12 Apr 2024 07:01  -  13 Apr 2024 07:00  --------------------------------------------------------  IN: 0 mL / OUT: 3450 mL / NET: -3450 mL    13 Apr 2024 07:01  -  13 Apr 2024 21:37  --------------------------------------------------------  IN: 0 mL / OUT: 700 mL / NET: -700 mL        RADIOLOGY & ADDITIONAL STUDIES:

## 2024-04-13 NOTE — PROGRESS NOTE ADULT - SUBJECTIVE AND OBJECTIVE BOX
ROXY FAN  MRN-[id number]    Patient has been seen and communicated with on multiple occasions, along with family members, 1 daughter, 1 son, 1 . They have been very invested in her treatment and are clearly documenting all the visitations as they happen.   Patient is very uncomfortable with the idea of surgery right now, citing her discomfort, her need to care for her right foot bunion which has had history of infection, and her current cough and congestion with a diagnosed pneumonia  patient is being followed by multiple specialties which have communicated no contraindications to surgery, but patient herself does not feel comfortable with surgery. she ideally wants to go home, recuperate, and come back to do surgery when she feels ready. she is against the idea of going to [facility] ([place] but her family is against her going home. she has difficulty taking deep breaths.   Pt denies Chest pain,  dyspnea, paresthesias, N/V/D, abdominal pain, syncope, or pain anywhere else.       Vital Signs Last 24 Hrs  T(C): 36.9 (04-13-24 @ 05:14), Max: 36.9 (04-12-24 @ 20:41)  T(F): 98.4 (04-13-24 @ 05:14), Max: 98.4 (04-12-24 @ 20:41)  HR: 86 (04-13-24 @ 05:14) (86 - 91)  BP: 169/96 (04-13-24 @ 05:14) (138/84 - 175/92)  BP(mean): 102 (04-12-24 @ 14:19) (102 - 102)  RR: 20 (04-13-24 @ 05:14) (19 - 20)  SpO2: 94% (04-13-24 @ 05:14) (93% - 94%)        Physical Exam:    General: AAOx3, NAD, resting comfortably in bed.    L Hip: Skin is pink and warm. Tender at the fracture site. Decreased ROM of the affected hip due to pain. SILT.  Positive logroll test.  Lower extremity:  No calf tenderness, calves are soft. 2+pulses. NVI. (+)EHL/FHL/ADF/APF. Noted to have chronic LE skin changes from stasis, Good capillary refill. Warm, well-perfused. SILT.                                     13.4   12.28 )-----------( 227      ( 13 Apr 2024 06:15 )             39.8   04-13    136  |  99  |  9   ----------------------------<  143<H>  3.2<L>   |  30  |  0.42<L>    Ca    8.8      13 Apr 2024 06:15  Phos  2.4     04-13  Mg     2.3     04-13    TPro  7.1  /  Alb  3.0<L>  /  TBili  0.7  /  DBili  x   /  AST  24  /  ALT  20  /  AlkPhos  108  04-13        Impression:  72 y/o F with L Hip Basicervical Fracture    Plan:  -  D/w Dr. Lang- Recommends left hip IM nailing   -  Pain control  -  DVT prophylaxis  -  Medical Optimization  - clearances need to be written by pulmonology, medicine, and a clear note from neurology as these were the reasons cited for not having surgery by the family and the patient. attempts to persuade patient once these clearances align will be made.   otherwise patient is refusing surgery, wants to be discharged and f/u on an outpt basis to do surgery.   -  Consent: Pending  -  NWB to LLE  -  Case d/w Dr. Lang and medicine NP

## 2024-04-13 NOTE — PROGRESS NOTE ADULT - PROBLEM SELECTOR PLAN 4
- improving   < from: CT Abdomen and Pelvis No Cont (04.11.24 @ 15:14) >    Evaluation of the solid organs, vascular structures and GI tract is   limited without oral and IV contrast.    Limited evaluation for malignancy.    Distended bladder. Mild hydronephrosis may be related to the bladder   distention.    Moderate constipation.      < end of copied text >    renal f/u

## 2024-04-13 NOTE — DISCHARGE NOTE PROVIDER - NSDCMRMEDTOKEN_GEN_ALL_CORE_FT
alprazolam 1 mg tablet:   levothyroxine 112 mcg tablet:   Linzess 145 mcg capsule:   losartan 25 mg tablet:   MORPHINE SULFATE ER: TAKE 1 TABLET (60 MG) BY MOUTH EVERY 12 HOURS  OXYCODONE ACETAMINOPHEN: TAKE ONE TABLET BY MOUTH FIVE TIMES DAILY   alprazolam 1 mg tablet: 1 milligram(s) orally 2 times a day as needed for  anxiety pt takes 1-2 tabs  enoxaparin: 40 milligram(s) subcutaneous once a day for 2 weeks  levothyroxine 112 mcg tablet: 1 tab(s) orally once a day  Linzess 145 mcg capsule: 1 cap(s) orally once a day Pt stopped taking. Last refill 2/24/24  losartan 25 mg tablet: 1 tab(s) orally once a day  MORPHINE SULFATE ER: 1 tab(s) orally every 12 hours TAKE 1 TABLET (60 MG) BY MOUTH EVERY 12 HOURS  Movantik 12.5 mg oral tablet: 1 tab(s) orally once a day  oxyCODONE 10 mg oral tablet: 1 tab(s) orally every 4 hours As needed Severe Pain (7 - 10)  oxyCODONE 5 mg oral tablet: 1 tab(s) orally every 4 hours As needed Moderate Pain (4 - 6)  OXYCODONE ACETAMINOPHEN: 1 tab(s) orally 5 times a day TAKE ONE TABLET BY MOUTH FIVE TIMES DAILY  polyethylene glycol 3350 oral powder for reconstitution: 17 gram(s) orally once a day  senna leaf extract oral tablet: 2 tab(s) orally once a day (at bedtime)   acetaminophen-oxyCODONE 325 mg-5 mg oral tablet: 1 tab(s) orally every 6 hours, As Needed for severe breakthrough pain. do not take if drowsy. may cause constipation. do not drive   alprazolam 1 mg tablet: 1 milligram(s) orally 2 times a day as needed for  anxiety pt takes 1-2 tabs  diazepam 5 mg oral tablet: 1 tab(s) orally every 8 hours, As Needed muscle spasm. do not drive or operate machinery  enoxaparin: 40 milligram(s) subcutaneous once a day for 2 weeks  levothyroxine 112 mcg tablet: 1 tab(s) orally once a day  Linzess 145 mcg capsule: 1 cap(s) orally once a day Pt stopped taking. Last refill 2/24/24  losartan 25 mg tablet: 1 tab(s) orally once a day  MORPHINE SULFATE ER: 1 tab(s) orally every 12 hours TAKE 1 TABLET (60 MG) BY MOUTH EVERY 12 HOURS  Movantik 12.5 mg oral tablet: 1 tab(s) orally once a day  oxyCODONE 10 mg oral tablet: 1 tab(s) orally every 4 hours As needed Severe Pain (7 - 10)  oxyCODONE 5 mg oral tablet: 1 tab(s) orally every 4 hours As needed Moderate Pain (4 - 6)  OXYCODONE ACETAMINOPHEN: 1 tab(s) orally 5 times a day TAKE ONE TABLET BY MOUTH FIVE TIMES DAILY  polyethylene glycol 3350 oral powder for reconstitution: 17 gram(s) orally once a day  senna leaf extract oral tablet: 2 tab(s) orally once a day (at bedtime)   acetaminophen 500 mg oral tablet: 2 tab(s) orally every 8 hours as needed for Mild Pain (1 - 3)  albuterol 90 mcg/inh inhalation aerosol: 2 puff(s) inhaled every 6 hours as needed for Bronchospasm  alprazolam 1 mg tablet: 1 milligram(s) orally 2 times a day as needed for  anxiety pt takes 1-2 tabs  benzonatate 100 mg oral capsule: 1 cap(s) orally every 8 hours as needed for Cough  bisacodyl 5 mg oral delayed release tablet: 1 tab(s) orally once a day as needed for Constipation  cyclobenzaprine 5 mg oral tablet: 1 tab(s) orally every 8 hours as needed for Spasm  diazepam 5 mg oral tablet: 1 tab(s) orally every 8 hours, As Needed muscle spasm. do not drive or operate machinery  enoxaparin: 40 milligram(s) subcutaneous once a day for 2 weeks  ipratropium-albuterol 0.5 mg-2.5 mg/3 mL inhalation solution: 3 milliliter(s) inhaled every 6 hours  levothyroxine 112 mcg (0.112 mg) oral tablet: 1 tab(s) orally once a day  lidocaine 4% topical film: Apply topically to affected area once a day  losartan 50 mg oral tablet: 1 tab(s) orally once a day  MS Contin 15 mg oral tablet, extended release: 1 tab(s) orally every 12 hours MDD: 30mg  Narcan 4 mg/0.1 mL nasal spray: 1 spray(s) nasal once a day as needed for  shortness of breath and/or wheezing  nicotine 7 mg/24 hr transdermal film, extended release: 1 patch transdermal once a day  oxyCODONE 10 mg oral tablet: 1 tab(s) orally every 4 hours as needed for Severe Pain (7 - 10) MDD: 60mg  oxyCODONE 5 mg oral tablet: 1 tab(s) orally every 4 hours as needed for Moderate Pain (4 - 6) MDD: 30mg  polyethylene glycol 3350 oral powder for reconstitution: 17 gram(s) orally once a day  predniSONE 20 mg oral tablet: 1 tab(s) orally once a day  senna leaf extract oral tablet: 2 tab(s) orally once a day (at bedtime)

## 2024-04-13 NOTE — DISCHARGE NOTE PROVIDER - NSDCCPCAREPLAN_GEN_ALL_CORE_FT
PRINCIPAL DISCHARGE DIAGNOSIS  Diagnosis: Femoral neck fracture  Assessment and Plan of Treatment: You presented after a fall. You were evaluated by the orthopedics team and recommended left hip intramedullary nailing. However, you refused to go under surgery.  Physical therapy recommended -----------------      SECONDARY DISCHARGE DIAGNOSES  Diagnosis: Acute respiratory failure with hypoxia  Assessment and Plan of Treatment: You presented with shortness of breath and you require oxygen supplementation. You were evaluated by the pulmonologist and recommended bronchodilators and prednisone. You currently smoke cigarrete, discuss the importance of smoking cessation.  Continue oxygen supplementation  Follow up with pulmonologist or primary care provider  Acute respiratory failure (ARF) is a condition that happens when your lungs cannot get enough oxygen into your blood.  Do not smoke. Nicotine and other chemicals in cigarettes and cigars can cause lung damage and make your symptoms worse. Secondhand smoke can also make your symptoms worse. Avoid others who are smoking.  Prevent the spread of germs.  Limit alcohol.      Diagnosis: Hyponatremia  Assessment and Plan of Treatment: You were noted to have low sodium. You were evaluated by nephrologist. This is likely your body's reaction to pain. Your sodium is now within normal limits  When should I contact my healthcare provider?  You have muscle cramps or twitching.  You feel very weak or tired.  You have nausea or are vomiting.  You have questions or concerns about your condition or care.  When should I seek immediate care or call 911?  You have a seizure.  You have an irregular heartbeat.  You have trouble breathing.  You cannot move your arms and legs.  You are confused or cannot think clearly.    Diagnosis: COPD with pneumonia  Assessment and Plan of Treatment: You have history of COPD. Your CT showed cluster of nodular and patchy opacities suggestive of infection. You were given IV antibiotics. You were evaluated by pulmonologist and infectious doctor.  ----------  If your cough increases infrequency and severity and/or you have shortness of breath or increased shortness of breath call your Health Care Provider.  If you develop fever, chills, night sweats, malaise, and/or change in mental status call your Health care Provider.  Nutrition is very important.  Eat small frequent meals.  Increase your activity as tolerated.  Do not stay in bed all day    Diagnosis: Slurred speech  Assessment and Plan of Treatment: You complaint of slurred speech. You were evaluated by neurologist and your imaging did not show any acute findings.    Diagnosis: Severe protein-calorie malnutrition  Assessment and Plan of Treatment: You presented with progressive weight loss in the last year. You were seeing an oncologist outpatient for a work up of your lung nodule. However, you were not compliant with the recommendations of PET scan. You were evaluated by oncologist while you are in the hospital and recommended to have PET scan outpatient. No further work up was required inpatient at this time  You were evaluated by dietician. Continue taking oral supplements  Malnutrition occurs when you do not get enough calories or nutrients to keep you healthy. Nutrients include protein, fat, carbohydrates, vitamins, and minerals.  Increase calories and nutrients. A dietitian may help you plan larger, healthy meals. If you have trouble eating larger meals, eat small meals throughout the day. You may need to include snacks between meals. You may need to eat or drink a nutrition supplement if you have trouble eating the right kinds and amounts of food.      Diagnosis: Hypothyroidism  Assessment and Plan of Treatment: Continue home medication synthroid  you do not make enough thyroid hormone  signs & symptoms of low levels of thyroid hormone - tired, getting cold easily, coarse or thin hair, constipation, shortness of breath, swelling, irregular periods      Diagnosis: Anxiety  Assessment and Plan of Treatment: Continue your home medications. Follow up with your primary care provider or psychiatrist to establish long term goals and management of your anxiety    Diagnosis: Current smoker  Assessment and Plan of Treatment: You are counseled for smoking cessation    Diagnosis: HTN (hypertension)  Assessment and Plan of Treatment: Low salt diet  Activity as tolerated.  Take all medication as prescribed.  Follow up with your medical doctor for routine blood pressure monitoring at your next visit.  Notify your doctor if you have any of the following symptoms:   Dizziness, Lightheadedness, Blurry vision, Headache, Chest pain, Shortness of breath       PRINCIPAL DISCHARGE DIAGNOSIS  Diagnosis: Femoral neck fracture  Assessment and Plan of Treatment: You presented after a fall. You were evaluated by the orthopedics team and recommended left hip intramedullary nailing. However, you refused to go under surgery.  Physical therapy recommended -----------------  Pain Management-   Weight Bearing Status: WBAT to LLE with walker   Equipment needs: Commode, Walker  Dressing: Please keep bandage/dressing Clean, Dry, and Intact. LEAVE AQUACELL DRESSING ON UNTIL STAPLE REMOVAL or no longer clean and intact/saturated dresing.   if aquacel removed, change with waterproof dressing every 4-5 days or with regular dressing (4x4, abd, ACE. mepilex, etc) every 2-3 days.    Incision Site: NURSE TO REMOVE STAPLES on 5/1/24  STAPLES AND DRESSING TO BE REMOVED BY NURSE   NURSE TO APPLY STERI-STRIPS TO THE WOUND AFTER STAPLE REMOVAL   Dvt prophylaxis: D/C LOVENOX on 5/1/24  PT/Occupational Therapy are Activities of Daily Living as appropriate  Follow up with Orthopedic Surgeon Dr. Lang after JASEN ARE REMOVED      SECONDARY DISCHARGE DIAGNOSES  Diagnosis: Acute respiratory failure with hypoxia  Assessment and Plan of Treatment: You presented with shortness of breath and you require oxygen supplementation. You were evaluated by the pulmonologist and recommended bronchodilators and prednisone. You currently smoke cigarrete, discuss the importance of smoking cessation.  Continue oxygen supplementation  Follow up with pulmonologist or primary care provider  Acute respiratory failure (ARF) is a condition that happens when your lungs cannot get enough oxygen into your blood.  Do not smoke. Nicotine and other chemicals in cigarettes and cigars can cause lung damage and make your symptoms worse. Secondhand smoke can also make your symptoms worse. Avoid others who are smoking.  Prevent the spread of germs.  Limit alcohol.      Diagnosis: COPD with pneumonia  Assessment and Plan of Treatment: You have history of COPD. Your CT showed cluster of nodular and patchy opacities suggestive of infection. You were given IV antibiotics. You were evaluated by pulmonologist and infectious doctor.  ----------  If your cough increases infrequency and severity and/or you have shortness of breath or increased shortness of breath call your Health Care Provider.  If you develop fever, chills, night sweats, malaise, and/or change in mental status call your Health care Provider.  Nutrition is very important.  Eat small frequent meals.  Increase your activity as tolerated.  Do not stay in bed all day    Diagnosis: Hyponatremia  Assessment and Plan of Treatment: You were noted to have low sodium. You were evaluated by nephrologist. This is likely your body's reaction to pain. Your sodium is now within normal limits  When should I contact my healthcare provider?  You have muscle cramps or twitching.  You feel very weak or tired.  You have nausea or are vomiting.  You have questions or concerns about your condition or care.  When should I seek immediate care or call 911?  You have a seizure.  You have an irregular heartbeat.  You have trouble breathing.  You cannot move your arms and legs.  You are confused or cannot think clearly.    Diagnosis: Slurred speech  Assessment and Plan of Treatment: You complaint of slurred speech. You were evaluated by neurologist and your imaging did not show any acute findings.    Diagnosis: Severe protein-calorie malnutrition  Assessment and Plan of Treatment: You presented with progressive weight loss in the last year. You were seeing an oncologist outpatient for a work up of your lung nodule. However, you were not compliant with the recommendations of PET scan. You were evaluated by oncologist while you are in the hospital and recommended to have PET scan outpatient. No further work up was required inpatient at this time  You were evaluated by dietician. Continue taking oral supplements  Malnutrition occurs when you do not get enough calories or nutrients to keep you healthy. Nutrients include protein, fat, carbohydrates, vitamins, and minerals.  Increase calories and nutrients. A dietitian may help you plan larger, healthy meals. If you have trouble eating larger meals, eat small meals throughout the day. You may need to include snacks between meals. You may need to eat or drink a nutrition supplement if you have trouble eating the right kinds and amounts of food.      Diagnosis: HTN (hypertension)  Assessment and Plan of Treatment: Low salt diet  Activity as tolerated.  Take all medication as prescribed.  Follow up with your medical doctor for routine blood pressure monitoring at your next visit.  Notify your doctor if you have any of the following symptoms:   Dizziness, Lightheadedness, Blurry vision, Headache, Chest pain, Shortness of breath      Diagnosis: Hypothyroidism  Assessment and Plan of Treatment: Continue home medication synthroid  you do not make enough thyroid hormone  signs & symptoms of low levels of thyroid hormone - tired, getting cold easily, coarse or thin hair, constipation, shortness of breath, swelling, irregular periods      Diagnosis: Anxiety  Assessment and Plan of Treatment: Continue your home medications. Follow up with your primary care provider or psychiatrist to establish long term goals and management of your anxiety    Diagnosis: Current smoker  Assessment and Plan of Treatment: You are counseled for smoking cessation     PRINCIPAL DISCHARGE DIAGNOSIS  Diagnosis: Femoral neck fracture  Assessment and Plan of Treatment: You presented after a fall. You were evaluated by the orthopedics team and recommended left hip intramedullary nailing which was performed on 4/16/2024   Pain Management-  please utilize Naloxone if patient needs one  while on opiates for pain control   Weight Bearing Status: WBAT to LLE with walker   Equipment needs: Commode, Walker  Dressing: Please keep bandage/dressing Clean, Dry, and Intact. LEAVE AQUACELL DRESSING ON UNTIL STAPLE REMOVAL or no longer clean and intact/saturated dresing.   if aquacel removed, change with waterproof dressing every 4-5 days or with regular dressing (4x4, abd, ACE. mepilex, etc) every 2-3 days.    Incision Site: NURSE TO REMOVE STAPLES on 5/1/24  STAPLES AND DRESSING TO BE REMOVED BY NURSE   NURSE TO APPLY STERI-STRIPS TO THE WOUND AFTER STAPLE REMOVAL   Dvt prophylaxis: D/C LOVENOX on 5/1/24  PT/Occupational Therapy are Activities of Daily Living as appropriate  Follow up with Orthopedic Surgeon Dr. Lang after JASEN ARE REMOVED --- PLEASE MAKE AN APPOITMENT        SECONDARY DISCHARGE DIAGNOSES  Diagnosis: Acute respiratory failure with hypoxia  Assessment and Plan of Treatment: You presented with shortness of breath and you require oxygen supplementation. You were evaluated by the pulmonologist and recommended bronchodilators and prednisone. You currently smoke cigarrete, discuss the importance of smoking cessation.  Continue oxygen supplementation  Follow up with pulmonologist or primary care provider  Acute respiratory failure (ARF) is a condition that happens when your lungs cannot get enough oxygen into your blood.  Do not smoke. Nicotine and other chemicals in cigarettes and cigars can cause lung damage and make your symptoms worse. Secondhand smoke can also make your symptoms worse. Avoid others who are smoking.  Prevent the spread of germs.  Limit alcohol.      Diagnosis: COPD with pneumonia  Assessment and Plan of Treatment: You have history of COPD. Your CT showed cluster of nodular and patchy opacities suggestive of infection. You were given IV antibiotics. You were evaluated by pulmonologist and infectious doctor.  Completing her predisone on 4/20 then please have an re- evalatuion with pulmonology  if facility has own pulmonologist or make the appoitment with Donna Albarado or Dr. Kong,  or Torrey Serna  If your cough increases infrequency and severity and/or you have shortness of breath or increased shortness of breath call your Health Care Provider.  If you develop fever, chills, night sweats, malaise, and/or change in mental status call your Health care Provider.  Nutrition is very important.  Eat small frequent meals.  Increase your activity as tolerated.  Do not stay in bed all day    Diagnosis: Hyponatremia  Assessment and Plan of Treatment: You were noted to have low sodium. You were evaluated by nephrologist. This is likely your body's reaction to pain. Your sodium is now within normal limits  When should I contact my healthcare provider?  You have muscle cramps or twitching.  You feel very weak or tired.  You have nausea or are vomiting.  You have questions or concerns about your condition or care.  When should I seek immediate care or call 911?  You have a seizure.  You have an irregular heartbeat.  You have trouble breathing.  You cannot move your arms and legs.  You are confused or cannot think clearly.    Diagnosis: Slurred speech  Assessment and Plan of Treatment: You complaint of slurred speech. You were evaluated by neurologist and your imaging did not show any acute findings.    Diagnosis: Severe protein-calorie malnutrition  Assessment and Plan of Treatment: You presented with progressive weight loss in the last year. You were seeing an oncologist outpatient for a work up of your lung nodule. However, you were not compliant with the recommendations of PET scan. You were evaluated by oncologist while you are in the hospital and recommended to have PET scan outpatient. No further work up was required inpatient at this time  You were evaluated by dietician. Continue taking oral supplements  Malnutrition occurs when you do not get enough calories or nutrients to keep you healthy. Nutrients include protein, fat, carbohydrates, vitamins, and minerals.  Increase calories and nutrients. A dietitian may help you plan larger, healthy meals. If you have trouble eating larger meals, eat small meals throughout the day. You may need to include snacks between meals. You may need to eat or drink a nutrition supplement if you have trouble eating the right kinds and amounts of food.      Diagnosis: HTN (hypertension)  Assessment and Plan of Treatment: Low salt diet  Activity as tolerated.  Take all medication as prescribed.  Follow up with your medical doctor for routine blood pressure monitoring at your next visit.  Notify your doctor if you have any of the following symptoms:   Dizziness, Lightheadedness, Blurry vision, Headache, Chest pain, Shortness of breath      Diagnosis: Hypothyroidism  Assessment and Plan of Treatment: Continue home medication synthroid  you do not make enough thyroid hormone  signs & symptoms of low levels of thyroid hormone - tired, getting cold easily, coarse or thin hair, constipation, shortness of breath, swelling, irregular periods      Diagnosis: Anxiety  Assessment and Plan of Treatment: Continue your home medications. Follow up with your primary care provider or psychiatrist to establish long term goals and management of your anxiety    Diagnosis: Current smoker  Assessment and Plan of Treatment: You are counseled for smoking cessation    Diagnosis: Urinary retention  Assessment and Plan of Treatment: common complication after anesthesia   new miller catheter was inserted on 4/19/2024 pleae follow up with urologist if you failed again on trail of void  please follow up with facility protocol in terms of changing catheter or bladder tranining    Diagnosis: Constipation  Assessment and Plan of Treatment: likely due to pain medications and limited mobility status due to recent surgery   please take all bowel regimen and monitor bowel movement    Diagnosis: Adenovirus infection  Assessment and Plan of Treatment: found positive adenovirus from [facility] RVP panel on 4/16  prior to operation   You have been afebrile and no White blood cell count to reflect virus infection  you can be off isolation as this time

## 2024-04-13 NOTE — DISCHARGE NOTE PROVIDER - HOSPITAL COURSE
73-year-old female from home ambulates with a walker at baseline with PMHx of HTN, COPD (not on O2 at home), current smoker with 60 ppy hx, hypothyroidism, spinal stenosis, urinary incontinence, presenting with left-sided lower back and hip pain after fall 2 weeks ago. CTH and cervical spine shows No gross acute intracranial hemorrhage. CT head no acute intracranial hemorrhage. CT pelvis shows Acute proximal left femoral fracture. Pt is admitted for L hip fracture and COPD exacerbation 2/2 PNA.  Ortho following, recommending left hip IM nailing. Pt is not agreeable for the procedure at this time, will re-visit this discussion as per ortho. Also, there was a concern for slurred speech and possible stroke. Neuro following, recommending adequate imaging evaluation of her spine, especially the cervical spine, should precede operation (precede getting intubated for respiratory support). As per pt and family, she is allergic to contrast dye (unable to recall the reaction) hence CT scan with contrast is unable to perform. Pt recently had MR of lumbar spine non con (3 weeks ago). At this time, we will proceed with MR head C/T-spine non contrast as per neuro recs.  Nephro consulted for hyponatremia and pulmonology for COPD exacerbation    Spoke with daughter Sallie,  and patient in lengthy, updated about the plan of care, answered all questions. Pt is complaining of generalized pain, pain mgt following. Spoke with neuro about the MR results, as per neuro, there is nothing from the imaging that precludes an orthopedic procedure. Ortho made aware, will F/U when the procedure will be scheduled. Discussed with primary attending, Dr Muro    Pt is medically optimized for discharge, discussed with the attending  This is just a brief hospital course, please refer to the progress notes for detailed information 73-year-old female from home ambulates with a walker at baseline with PMHx of HTN, COPD (not on O2 at home), current smoker with 60 ppy hx, hypothyroidism, spinal stenosis, urinary incontinence, presenting with left-sided lower back and hip pain after fall 2 weeks ago. CTH and cervical spine shows No gross acute intracranial hemorrhage. CT head no acute intracranial hemorrhage. CT pelvis shows Acute proximal left femoral fracture. Pt is admitted for L hip fracture and COPD exacerbation 2/2 PNA.  Ortho following, recommending left hip IM nailing. Pt is not agreeable for the procedure at this time, will re-visit this discussion as per ortho. Also, there was a concern for slurred speech and possible stroke. Neuro following, recommending adequate imaging evaluation of her spine, especially the cervical spine, should precede operation (precede getting intubated for respiratory support). As per pt and family, she is allergic to contrast dye (unable to recall the reaction) hence CT scan with contrast is unable to perform. Pt recently had MR of lumbar spine non con (3 weeks ago). At this time, we will proceed with MR head C/T-spine non contrast as per neuro recs.  Nephro consulted for hyponatremia and pulmonology for COPD exacerbation.     Spoke with daughter Sallie,  and patient in lengthy, updated about the plan of care, answered all questions. Pt is complaining of generalized pain, pain mgt following. Spoke with neuro about the MR results, as per neuro, there is nothing from the imaging that precludes an orthopedic procedure. Ortho made aware, will F/U when the procedure will be scheduled. Discussed with primary attending, Dr Muro.   Patient failed TOV and Eli inserted. Patient to follow up with Urology and Pulm outpatient.     Given patient's improved clinical status and current hemodynamic stability, decision was made to discharge. Please note that this is a brief summary of hospital course. Please refer to daily progress notes and consult notes for full course and events.   73-year-old female from home ambulates with a walker at baseline with PMHx of HTN, COPD (not on O2 at home), current smoker with 60 ppy hx, hypothyroidism, spinal stenosis, urinary incontinence, presenting with left-sided lower back and hip pain after fall 2 weeks ago. CTH and cervical spine shows No gross acute intracranial hemorrhage. CT head no acute intracranial hemorrhage. CT pelvis shows Acute proximal left femoral fracture. Pt is admitted for L hip fracture and COPD exacerbation 2/2 PNA.  Ortho following, recommending left hip IM nailing. Pt is not agreeable for the procedure at this time, will re-visit this discussion as per ortho. Also, there was a concern for slurred speech and possible stroke. Neuro following, recommending adequate imaging evaluation of her spine, especially the cervical spine, should precede operation (precede getting intubated for respiratory support). As per pt and family, she is allergic to contrast dye (unable to recall the reaction) hence CT scan with contrast is unable to perform. Pt recently had MR of lumbar spine non con (3 weeks ago). At this time, we will proceed with MR head C/T-spine non contrast as per neuro recs.  Nephro consulted for hyponatremia and pulmonology for COPD exacerbation.     Spoke with daughter Sallie,  and patient in lengthy, updated about the plan of care, answered all questions. Pt is complaining of generalized pain, pain mgt following. Spoke with neuro about the MR results, as per neuro, there is nothing from the imaging that precludes an orthopedic procedure.   Ortho consulted for Lt hip fx s/p Lt hip IM nailing on 4/16, post -op PT recs KAMALJIT, pt is accepted and [facility]. Prior to surgery RVP tested and pt found to be positive for Adenovirus, placed contact isolation, stable without fever or leukocytosis  Pt with extensive hx of smoking and COPD, started on Prednisone P.O for COPD exacerbation per pulm, completing her dose on 4/20.    Patient failed TOV and Eli inserted on 4/19/24. Patient to follow up with Urology and Pulm outpatient.   Due to Constipation, enema was given and effective last night, continue bowel regimen while on pain regimen with limited mobility due to recent Lt hip surgery.   Given patient's improved clinical status and current hemodynamic stability, decision was made to discharge. Please note that this is a brief summary of hospital course. Please refer to daily progress notes and consult notes for full course and events.

## 2024-04-13 NOTE — PROGRESS NOTE ADULT - ASSESSMENT
Patient is a 73y old  Female who is from home ambulates with a walker at baseline with PMH of HTN, COPD ( not on O2 at home), current smoker with 60 ppy hx, hypothyroidism, spinal stenosis urinary incontinence, now presents to the ER for evaluation of left-sided lower back and hip pain after fall 2 weeks ago. Approximately 1-2 weeks ago the patient fell while walking and her leg "gave out" and since then she has had left-sided leg pain.  Patient reports she is able to walk with her cane/walker but feels very weak and unbalanced and has since fallen 2 more times. Pt also complains of SOB and chest pain since the fall today after her cane hit her chest.  On admission, she has no fever but found to be hypoxic on RA to 80s saturating well on 1-2 L NC. The CT chest shows Dominant cluster of nodular and patchy opacities within the superior segment of the right lower lobe suggestive of infection with endobronchial spread. Emphysema. Intrahepatic and extrahepatic biliary ductal dilatation is of unclear etiology. CT pelvis shows Acute proximal left femoral fracture,  Age-indeterminate severe compression fracture of the L5 vertebral body. Pt is admitted for L hip fracture/ SOB 2/2 PNA vs COPD exacerbation. She has started on Zosyn and the ID consult requested to assist with further evaluation and antibiotic management.    # Pneumonia - Legionella urine Ag and MRSA PCR is negative   # Left femoral fracture, s/p Fall- Surgery on 4/11 as per Ortho, but was not done since patient is not optimized yet    would recommend :    1. Supplemental oxygenation and Bronchodilator as needed  2. Aspiration precaution  3. Continue Zosyn until 4/17/24, may change to oral  Augmentin 875mg q 12hours on discharge if discharge before 4/17  4. Pain management as needed    d/w patient and family at the bed side     Attending Attestation:    Spent more than 35 minutes on total encounter, more than 50 % of the visit was spent counseling and/or coordinating care by the Attending physician.

## 2024-04-13 NOTE — CONSULT NOTE ADULT - CONSULT REQUESTED DATE/TIME
13-Apr-2024 21:37
09-Apr-2024 18:56
11-Apr-2024 12:30
10-Apr-2024 15:18
10-Apr-2024 11:08
11-Apr-2024 15:02
10-Apr-2024 15:00
11-Apr-2024 10:28

## 2024-04-13 NOTE — PROGRESS NOTE ADULT - ASSESSMENT
Patient is a 74yo Female with HTN on Losartan, active smoker with COPD ( not on O2 at home), hypothyroidism, spinal stenosis, urinary incontinence, p/w left hip and back pain s/p fall 2 weeks PTA a/w left hip fracture, SOB 2/2 PNA vs COPD exacerbation with hyponatremia and hypokalemia. Nephrology consulted for electrolyte abnormalities.     1. Hyponatremia- SIADH due to pain. Hyponatremia resolved. Repeat urine studies: Urine Osm 161 with urine Na 28; likely auto-correcting. Recc pain control (avoid NSAIDs). Pt with poor po intake; will give Ensure tid   TSH 5.92; mild elevation; defer to primary team. Monitor serum sodium.  2. Hypokalemia- Pt ordered for KCl 40 meq PO x1. Monitor serum potassium  3. Essential HTN- BP elevated likely due to pain. Recc pain control. Will also increase losartan to 50 mg daily. Monitor BP  4. Acute left femoral fracture- avoid NSAIDs. Plan as per [facility] NEPHROLOGY  Natan Addison M.D.  Júnior Borrego D.O.  Laurence Lopez M.D.  MD Katherine Garner, MSN, ANP-C    Telephone: [phone]  Facsimile: [phone] [id number], [address]  [place] [id number]

## 2024-04-13 NOTE — PROGRESS NOTE ADULT - SUBJECTIVE AND OBJECTIVE BOX
Patient is seen and examined at the bed side, is afebrile. She feels better, short of breath and chest pressure resolved. The Family at the bed side.      REVIEW OF SYSTEMS: All other review systems are negative      ALLERGIES: No Known Allergies      Vital Signs Last 24 Hrs  T(C): 36.8 (13 Apr 2024 14:27), Max: 36.9 (12 Apr 2024 20:41)  T(F): 98.2 (13 Apr 2024 14:27), Max: 98.4 (12 Apr 2024 20:41)  HR: 84 (13 Apr 2024 14:27) (84 - 88)  BP: 144/92 (13 Apr 2024 14:27) (144/92 - 175/92)  BP(mean): --  RR: 19 (13 Apr 2024 14:27) (19 - 20)  SpO2: 94% (13 Apr 2024 14:27) (93% - 94%)    Parameters below as of 13 Apr 2024 14:27  Patient On (Oxygen Delivery Method): room air      PHYSICAL EXAM:  GENERAL: Not in distress, oFF oxygen   CHEST/LUNG: Not using accessory muscles   HEART: s1 and s2 present  ABDOMEN:  Nontender and  Nondistended  EXTREMITIES: No pedal  edema  CNS: Awake and Alert      LABS:                        13.4   12.28 )-----------( 227      ( 13 Apr 2024 06:15 )             39.8                           11.6   8.42  )-----------( 188      ( 12 Apr 2024 07:37 )             34.9                  04-13    136  |  99  |  9   ----------------------------<  143<H>  3.2<L>   |  30  |  0.42<L>    Ca    8.8      13 Apr 2024 06:15  Phos  2.4     04-13  Mg     2.3     04-13    TPro  7.1  /  Alb  3.0<L>  /  TBili  0.7  /  DBili  x   /  AST  24  /  ALT  20  /  AlkPhos  108  04-13           04-12    137  |  100  |  8   ----------------------------<  140<H>  3.1<L>   |  31  |  0.40<L>    Ca    8.1<L>      12 Apr 2024 07:37  Phos  2.5     04-12  Mg     2.0     04-12    TPro  5.9<L>  /  Alb  2.6<L>  /  TBili  0.5  /  DBili  x   /  AST  21  /  ALT  19  /  AlkPhos  78  04-12  PT/INR - ( 10 Apr 2024 05:10 )   PT: 10.9 sec;   INR: 0.95 ratio       PTT - ( 10 Apr 2024 05:10 )  PTT:29.3 sec      MEDICATIONS  (STANDING):    acetaminophen     Tablet .. 1000 milliGRAM(s) Oral every 8 hours  albuterol/ipratropium for Nebulization 3 milliLiter(s) Nebulizer every 6 hours  budesonide 160 MICROgram(s)/formoterol 4.5 MICROgram(s) Inhaler 2 Puff(s) Inhalation two times a day  chlorhexidine 2% Cloths 1 Application(s) Topical <User Schedule>  gabapentin 100 milliGRAM(s) Oral every 8 hours  guaiFENesin Oral Liquid (Sugar-Free) 200 milliGRAM(s) Oral every 6 hours  heparin   Injectable 5000 Unit(s) SubCutaneous every 8 hours  levothyroxine 112 MICROGram(s) Oral daily  lidocaine   4% Patch 2 Patch Transdermal daily  naloxone Injectable 0.4 milliGRAM(s) IV Push once  nicotine -   7 mG/24Hr(s) Patch 1 Patch Transdermal daily  piperacillin/tazobactam IVPB.. 3.375 Gram(s) IV Intermittent every 8 hours  polyethylene glycol 3350 17 Gram(s) Oral daily  predniSONE   Tablet 40 milliGRAM(s) Oral daily  senna 2 Tablet(s) Oral at bedtime      RADIOLOGY & ADDITIONAL TESTS:      4/9/24 : CT Chest No Cont (04.09.24 @ 17:06) Axial CT images of the chest are obtained without intravenous administration of contrast.      IMPRESSION: Loss of height of multiple thoracic and lumbar spine vertebral bodies which are of indeterminate age. Clinical correlation for   back pain is recommended. Dedicated spinal MRI can be performed for complete evaluation.    Dominant cluster of nodular and patchy opacities within the superior segment of the right lower lobe suggestive of infection with   endobronchial spread. A 1-3 month follow-up noncontrast chest CT is recommended to ensure improvement/resolution.    Emphysema.    Intrahepatic and extrahepatic biliary ductal dilatation is of unclear etiology. Upper abdominal ultrasound can be performedfor further   evaluation as clinically warranted.      4/10/24: CT Head No Cont (04.10.24 @ 15:55) IMPRESSION: Age-appropriate involutional and ischemic gliotic changes. No   hemorrhage. No change since 4/9/2024.      4/9/24 : Xray Femur 2 Views, Left (04.09.24 @ 20:35) 1. Diffuse bony demineralization associated with a fracture along the   intertrochanteric line of the proximal left femur, with only slight upward migration of the distal segment, but less than one half bone width.  2. Degenerative arthropathy of the left knee.    4/9/24: Xray Pelvis AP only (04.09.24 @ 20:35) 1. Diffuse bony demineralization with a fracture along the   intertrochanteric line of the proximal left femur. No dislocation of the hips.        MICROBIOLOGY DATA:    MRSA/MSSA PCR (04.11.24 @ 07:02)   MRSA PCR Result.: NotDetec:    Legionella pneumophila Antigen, Urine (04.10.24 @ 05:40)   Legionella Antigen, Urine: Negative:      Respiratory Viral Panel with COVID-19 by SHAHANA (04.09.24 @ 21:24)   Rapid RVP Result: NotDete  SARS-CoV-2: NotDetec:

## 2024-04-13 NOTE — CONSULT NOTE ADULT - REASON FOR ADMISSION
L hip fracture

## 2024-04-13 NOTE — PROGRESS NOTE ADULT - PROBLEM SELECTOR PLAN 5
- complains of slurred speech  - imaging negative for acute findings  - neuro Dr Keyes following -- MRs head, c-spine, t-spine. If possible wo AND w contrast  - pt is allergic to contrast dye  - will proceed with MRs non con due to unable to determine if pt is allergic to contrast iodine dye or Gadolinium. Pt recently had MR L-spine non con OP  - MR results as above  - neuro f/u

## 2024-04-13 NOTE — PROGRESS NOTE ADULT - SUBJECTIVE AND OBJECTIVE BOX
[facility] NEPHROLOGY- PROGRESS NOTE    Patient is a 74yo Female with HTN on Losartan, active smoker with COPD ( not on O2 at home), hypothyroidism, spinal stenosis, urinary incontinence, p/w left hip and back pain s/p fall 2 weeks PTA a/w left hip fracture, SOB 2/2 PNA vs COPD exacerbation with hyponatremia and hypokalemia. Nephrology consulted for electrolyte abnormalities.   +wt loss of 30 lbs in 6 months    Hospital Medications: Medications reviewed.  REVIEW OF SYSTEMS:  CONSTITUTIONAL: No fevers or chills  RESPIRATORY: No shortness of breath  CARDIOVASCULAR: No chest pain. +rib pain  GASTROINTESTINAL: No nausea, vomiting, diarrhea or abdominal pain.   VASCULAR: No bilateral lower extremity edema. +left hip and back pain      VITALS:  T(F): 98.2 (04-13-24 @ 13:11), Max: 98.4 (04-12-24 @ 20:41)  HR: 87 (04-13-24 @ 13:11)  BP: 153/100 (04-13-24 @ 13:11)  RR: 19 (04-13-24 @ 13:11)  SpO2: 93% (04-13-24 @ 13:11)  Wt(kg): --    04-12 @ 07:01  -  04-13 @ 07:00  --------------------------------------------------------  IN: 0 mL / OUT: 3450 mL / NET: -3450 mL    04-13 @ 07:01  -  04-13 @ 15:00  --------------------------------------------------------  IN: 0 mL / OUT: 700 mL / NET: -700 mL      PHYSICAL EXAM:  Gen: Cachectic  Cards: RRR, +S1/S2, no M/G/R  Resp: +mild wheezing at bases  GI: soft, NT ND  : + miller  Extremities: trace LE edema B/L      LABS:  04-13    136  |  99  |  9   ----------------------------<  143<H>  3.2<L>   |  30  |  0.42<L>    Ca    8.8      13 Apr 2024 06:15  Phos  2.4     04-13  Mg     2.3     04-13    TPro  7.1  /  Alb  3.0<L>  /  TBili  0.7  /  DBili      /  AST  24  /  ALT  20  /  AlkPhos  108  04-13    Creatinine Trend: 0.42 <--, 0.40 <--, 0.46 <--, 0.40 <--, 0.43 <--, 0.47 <--                        13.4   12.28 )-----------( 227      ( 13 Apr 2024 06:15 )             39.8     Urine Studies:  Urinalysis Basic - ( 13 Apr 2024 06:15 )    Color:  / Appearance:  / SG:  / pH:   Gluc: 143 mg/dL / Ketone:   / Bili:  / Urobili:    Blood:  / Protein:  / Nitrite:    Leuk Esterase:  / RBC:  / WBC    Sq Epi:  / Non Sq Epi:  / Bacteria:       Sodium, Random Urine: 28 mmol/L (04-11 @ 19:45)  Osmolality, Random Urine: 161 mos/kg (04-11 @ 19:45)  Sodium, Random Urine: 42 mmol/L (04-11 @ 16:35)  Creatinine, Random Urine: 53 mg/dL (04-11 @ 16:35)  Protein/Creatinine Ratio Calculation: 0.6 Ratio (04-11 @ 16:35)  Potassium, Random Urine: 39 mmol/L (04-11 @ 16:35)  Osmolality, Random Urine: 279 mos/kg (04-11 @ 06:30)

## 2024-04-13 NOTE — DISCHARGE NOTE PROVIDER - NSDCFUADDAPPT_GEN_ALL_CORE_FT
pulmonologist listed added under care provider follow up - as you needed if [facility] does not have their won pulmonologist please make the appointment with one of the pulmonologist for your COPD management

## 2024-04-13 NOTE — DISCHARGE NOTE PROVIDER - NPI NUMBER (FOR SYSADMIN USE ONLY) :
[[id number] [[id number]],[[id number]] [[id number]],[[id number]]

## 2024-04-13 NOTE — DISCHARGE NOTE PROVIDER - PROVIDER TOKENS
PROVIDER:[TOKEN:[[id number]],FOLLOWUP:[2 weeks]],PROVIDER:[TOKEN:[[id number]],FOLLOWUP:[2 weeks]] PROVIDER:[TOKEN:[[id number]],FOLLOWUP:[2 weeks]],PROVIDER:[TOKEN:[[id number]],FOLLOWUP:[2 weeks]],PROVIDER:[TOKEN:[[id number]],FOLLOWUP:[2 weeks]] PROVIDER:[TOKEN:[[id number]],FOLLOWUP:[2 weeks]],PROVIDER:[TOKEN:[[id number]],FOLLOWUP:[2 weeks]],PROVIDER:[TOKEN:[[id number]],FOLLOWUP:[2 weeks]],PROVIDER:[TOKEN:[[id number]],FOLLOWUP:[2 weeks]],PROVIDER:[TOKEN:[[id number]],FOLLOWUP:[2 weeks]]

## 2024-04-13 NOTE — PROGRESS NOTE ADULT - SUBJECTIVE AND OBJECTIVE BOX
Source of information: [facility]OX[facility] FAN, Chart review  Patient language: English  : n/a    HPI:  73-year-old female from home ambulates with a walker at baseline with PMH of HTN, COPD ( not on O2 at home), current smoker with 60 ppy hx, hypothyroidism, spinal stenosis urinary incontinence, presenting with left-sided lower back and hip pain after fall 2 weeks ago. Approximately 1-2 weeks ago the patient fell while walking and her leg "gave out" and since then she has had left-sided leg pain.  Patient reports she is able to walk with her cane/walker but feels very weak and unbalanced and has since fallen 2 more times. Pt also complains of SOB. Patient reporting chest pain since the fall today after her cane hit her chest. Patient denies any headache, dizziness, cough, congestion, fever, chills, N,V,D,Abd pain, numbness or tingling. In the ED, pt is comfortable, but complains of pain, NAD, pt is wheezing b/l with R sided crackles, pt was found to be hypoxic on RA to 80s saturating well on 1-2 L NC, hypertensive 175/105, wbc 9, Na 130. CTH and cervical spine shows No gross acute intracranial hemorrhage. No gross acute fracture cervical spine. CTC shows Loss of height of multiple thoracic and lumbar spine vertebral bodies which are of indeterminate age. Dominant cluster of nodular and patchy opacities within the superior segment of the right lower lobe suggestive of infection with endobronchial spread. Emphysema. Intrahepatic and extrahepatic biliary ductal dilatation is of unclear etiology. CT pelvis shows Acute proximal left femoral fracture,  Age-indeterminate severe compression fracture of the L5 vertebral body. Pt is admitted for L hip fracture/ SOB 2/2 PNA vs COPD exacerbation.    Off note: pt states that she lost about 30 lbs in the last 2 months. She is undergoing colonoscopy o/p to evaluate for weight loss. pt denies any blood in the stool. However, she has family history of colon ca in her father.  (09 Apr 2024 19:22)    This is a Patient is a 73y old  Female who presents with a chief complaint of L hip fracture (13 Apr 2024 15:04)    X-ray of pelvis on 4/9 demonstrated diffuse bony demineralization with a fracture along the intertrochanteric line of the proximal left femur, as detailed above. No dislocation of the hips.    CTAP 4/11 demonstrated osteoporosis, moderate to severe L1, L2, L4 and L5 compression fractures. No osseous retropulsion.    CT Pelvis 4/9 demonstrated acute proximal left femoral fracture, as above. Age-indeterminate severe compression fracture of the L5 vertebral body.    Pt is admitted for s/p fall, left hip pain. CT pelvis shows cute proximal left femoral fracture,  Age-indeterminate severe compression fracture of the L5 vertebral body.  CTAP 4/11 demonstrated moderate to severe L1, L2, L4 and L5 compression fractures. Pain consulted for left hip pain. Pt with Hx of chronic opioid dependence. I-Stop reviewed. Pt on Morphine 60 mg ER PO BID, last refill on 3/2024, oxycodone and Xanax. Pt seen and examined at bedside. Reports pain score 10/10 on left hip and lower back.  SCALE USED: (1-10 VNRS). Pt describes pain as sharp pain, aching and is exacerbated by movement especially during position changes. Pain is partially alleviated by pain medication and exacerbated by movement. Pt tolerating PO diet. Denies lethargy, nausea, vomiting, constipation, itchiness. Reports last BM 4/7. Patient stated goal for pain control: to be able to take deep breaths, get out of bed to chair and ambulate with tolerable pain control. Pt reports walking with cane and walker baseline. Ortho consulted, recommends Left hip IM nailing however pt is not yet optimized for surgery.     PAST MEDICAL & SURGICAL HISTORY:    HTN (hypertension)    Hypothyroidism    COPD (chronic obstructive pulmonary disease)    Spinal stenosis    Current smoker    H/O foot surgery    FAMILY HISTORY:  FH: colon cancer (Father)    Social History:  lives with  ambulates with a cane or walker (09 Apr 2024 19:22)  [X]Denies ETOH use, illicit drug use  + smoking     Allergies  contrast media (iodine-based) (Unknown)    MEDICATIONS  (STANDING):  acetaminophen     Tablet .. 1000 milliGRAM(s) Oral every 8 hours  albuterol/ipratropium for Nebulization 3 milliLiter(s) Nebulizer every 6 hours  budesonide 160 MICROgram(s)/formoterol 4.5 MICROgram(s) Inhaler 2 Puff(s) Inhalation two times a day  chlorhexidine 2% Cloths 1 Application(s) Topical <User Schedule>  gabapentin 100 milliGRAM(s) Oral every 8 hours  guaiFENesin Oral Liquid (Sugar-Free) 200 milliGRAM(s) Oral every 6 hours  heparin   Injectable 5000 Unit(s) SubCutaneous every 8 hours  levothyroxine 112 MICROGram(s) Oral daily  lidocaine   4% Patch 2 Patch Transdermal daily  naloxone Injectable 0.4 milliGRAM(s) IV Push once  nicotine -   7 mG/24Hr(s) Patch 1 Patch Transdermal daily  piperacillin/tazobactam IVPB.. 3.375 Gram(s) IV Intermittent every 8 hours  polyethylene glycol 3350 17 Gram(s) Oral daily  predniSONE   Tablet 40 milliGRAM(s) Oral daily  senna 2 Tablet(s) Oral at bedtime    MEDICATIONS  (PRN):  albuterol    90 MICROgram(s) HFA Inhaler 2 Puff(s) Inhalation every 6 hours PRN Bronchospasm  ALPRAZolam 1 milliGRAM(s) Oral every 12 hours PRN Anxiety  benzonatate 100 milliGRAM(s) Oral every 8 hours PRN Cough  bisacodyl 5 milliGRAM(s) Oral daily PRN Constipation  cyclobenzaprine 5 milliGRAM(s) Oral every 8 hours PRN Spasm  oxyCODONE    IR 5 milliGRAM(s) Oral every 4 hours PRN Moderate Pain (4 - 6)  oxyCODONE    IR 10 milliGRAM(s) Oral every 4 hours PRN Severe Pain (7 - 10)    Vital Signs Last 24 Hrs  T(C): 36.8 (13 Apr 2024 13:11), Max: 36.9 (12 Apr 2024 20:41)  T(F): 98.2 (13 Apr 2024 13:11), Max: 98.4 (12 Apr 2024 20:41)  HR: 87 (13 Apr 2024 13:11) (86 - 88)  BP: 153/100 (13 Apr 2024 13:11) (153/100 - 175/92)  BP(mean): --  RR: 19 (13 Apr 2024 13:11) (19 - 20)  SpO2: 93% (13 Apr 2024 13:11) (93% - 94%)    Parameters below as of 13 Apr 2024 13:11  Patient On (Oxygen Delivery Method): room air      SARS-CoV-2: NotDetec (09 Apr 2024 21:24)    LABS: Reviewed                        13.4   12.28 )-----------( 227      ( 13 Apr 2024 06:15 )             39.8     04-13    136  |  99  |  9   ----------------------------<  143<H>  3.2<L>   |  30  |  0.42<L>    Ca    8.8      13 Apr 2024 06:15  Phos  2.4     04-13  Mg     2.3     04-13    TPro  7.1  /  Alb  3.0<L>  /  TBili  0.7  /  DBili  x   /  AST  24  /  ALT  20  /  AlkPhos  108  04-13      LIVER FUNCTIONS - ( 13 Apr 2024 06:15 )  Alb: 3.0 g/dL / Pro: 7.1 g/dL / ALK PHOS: 108 U/L / ALT: 20 U/L DA / AST: 24 U/L / GGT: x           Urinalysis Basic - ( 13 Apr 2024 06:15 )    Color: x / Appearance: x / SG: x / pH: x  Gluc: 143 mg/dL / Ketone: x  / Bili: x / Urobili: x   Blood: x / Protein: x / Nitrite: x   Leuk Esterase: x / RBC: x / WBC x   Sq Epi: x / Non Sq Epi: x / Bacteria: x    CAPILLARY BLOOD GLUCOSE    SARS-CoV-2: NotDetec (09 Apr 2024 21:24)    Radiology: Reviewed  < from: MR Thoracic Spine No Cont (04.11.24 @ 19:15) >  ACC: [id number] EXAM:  MR BRAIN   ORDERED BY: TAMMI CHRISTIE     ACC: [id number] EXAM:  MR SPINE THORACIC   ORDERED BY: TAMMI CHRISTIE     ACC: [id number] EXAM:  MR SPINE CERVICAL   ORDERED BY: TAMMI CHRISTIE     PROCEDURE DATE:  04/11/2024        INTERPRETATION:  Three examinations were performed in this patient:  1.  MR of the brain without gadolinium contrast  2.  MR cervical spine without gadolinium contrast  3.  MR thoracic spine without gadolinium contrast    CLINICAL INFORMATION: Post fall trauma cord compression  HMR  Admitting   Dxs: S72.009A FRACTURE OF UNSP PART OF NECK OF UNSP FEMUR, INIT    TECHNIQUE:  1. MR brain:  Sagittal and axial T1-weighted images, axial FLAIR images,   axial susceptibility weighted images, axial T2-weighted images and axial   diffusion weighted images of the brain were obtained.  2.  MR cervical spine:   Sagittal T2-weighted images were obtained.  3.  MR thoracic spine:   Sagittal T2-weighted images were obtained.  The remainder these examinations could not be completed, not tolerated by   the patient. The images that could be obtained are degraded by patient   motion.  CONTRAST:    None    COMPARISON:   CT head 4/10/2023 and CT cervical and CT chest 4/9/2023    FINDINGS:    BRAIN    BRAIN:   The brain demonstrates small focal lesions of remote deep   infarction. The most conspicuous are noted in the external capsules. No   cerebral cortical lesion is recognized.   No diffusion restriction is   found in the brain.  No acute cerebral cortical infarct is found.   No   intracranial hemorrhage is recognized.  No mass effect is found in the   brain.   The brain also demonstrates patchy indistinct lesions within the   deep cerebral hemispheric white matter typical for ischemic white matter   disease.    CSF SPACES:   The ventricles, sulci and basal cisterns appear moderately   dilated reflecting diffuse brain volume loss.    VESSELS:   The vertebral and internal carotid arteries demonstrate   expected flow voids indicating their patency.    HEAD AND NECK STRUCTURES:   The orbits are unremarkable.  Paranasal   sinuses are clear.  The nasal cavity demonstrates left-to-right septal   deviation. Left middle turbinate tristen bullosa is present..  The central   skull base appears intact.  The nasopharynx is symmetric.  The temporal   bones appear clear of disease.  The calvarium appears unremarkable.    CERVICAL SPINE    Cervical vertebral alignment is significant for grade 1 anterior   listhesis C4 on C5, also slight retrolisthesis C5 on C6.   Facet joints   appear aligned.   Cervical vertebral body heights are maintained.   Cervical vertebral marrow signal intensity mildly heterogeneous from   degenerative endplate changes. These include small marginal osteophytes   most prominently at C5-C6.  No osseous expansion or epidural disease is   found.  No destructive bone lesion is found.   There are osteoarthritic   changes at the articulation of the anterior ring of C1 and the odontoid   process of C2.  This arthropathy includes marginal osteophytes,   subchondral sclerosis and joint space narrowing.    Cervical intervertebral disc spaces demonstrate a generalized pattern of   degeneration characterized by signal intensity loss on the long TR   images. Degenerative disc height loss appears greatest at C5-C6.   Evaluation of degenerative disc disease is limited in the absence of   axial images.   However, no degenerative high-grade central canal   compromise is recognized.  Neural foramina appear narrowed by disc   material and uncovertebral joint osteophytes most prominently at the   lower cervical intervertebral disc levels..    Cervical cord morphology Is preserved.  The cervical cord maintains   intact signal intensity   No focal intrinsic cord lesion is identified.   No cord expansion or cord volume loss is recognized.    The visualized adjacent neck structures appear intact.  No neck mass is   recognized.  Paraspinal soft tissues appear intact.  Visualized lymph   nodes appear to be within physiologic size limits.    THORACIC SPINE    Thoracic vertebral alignment demonstrates exaggeration of the mid   thoracic kyphosis. This vertebral malalignment has an apex at the T7   inferior endplate compression fracture. Approximately 70% height loss   results at this level. More limited compression fractures involve the T6   inferior endplate and the T9 superior endplate. Schmorl's nodes result.   More limited superior endplate deformity of T5 and T11. The remaining   thoracic vertebral body heights are maintained. Superior endplate   deformities are also present at L1-L2 and L4. At the L2 vertebral body   burst fracture pattern is present with impression upon the ventral thecal   sac but does not appear to compromise the cauda equina. At the thoracic   vertebral fracture is no high-grade central canal compromise is   recognized. Thoracic vertebral marrow signal intensity relatively   homogeneous without recognition of significant marrow edema.  No   destructive bone lesion, osseous expansion or epidural disease is found.    The paraspinal soft tissues appear intact.    Thoracic intervertebral disc spaces demonstrate a generalized pattern of   at least mild degeneration with signal intensity loss on the long TR   images. Disc heights are largely maintained.   No high-grade degenerative   central canal compromise is recognized. Thoracic neural foramina appear   intact.    The thoracic cord morphology is preserved.  The thoracic cord maintains   intact signal intensity.      IMPRESSION:    1. BRAIN:   Basal ganglia lacunar infarctions. Ischemic white matter   disease and atrophy typical for age    2. CERVICAL SPINE:   Grade 1 anterolisthesis C4 on C5. Low-grade cervical   degenerative disc disease. Cervical cord appears intact.    3. THORACIC SPINE:   Exaggeration in mid thoracic kyphosis with multiple   thoracic endplate deformities that are age indeterminate but felt likely   to be long-standing. Thoracic cord appears intact.    4. Only limited incomplete examinations could be performed on this date.   Recommend completion scans once tolerated by the patient.    --- End of Report ---    GRAZYNA CRAWLEY MD; Attending Radiologist  This document has been electronically signed. Apr 11 2024  8:02PM    < end of copied text >    < from: Xray Pelvis AP only (04.09.24 @ 20:35) >    ACC: [id number] EXAM:  XR PELVIS AP ONLY 1-2 VIEWS   ORDERED BY: SAMEER KLEIN     PROCEDURE DATE:  04/09/2024      INTERPRETATION:  An AP radiograph of the pelvis was obtained for fracture.    Comparison is made to a CT scan of the pelvis earlier same day.    The bones appear slightly demineralized diffusely and is associated with   a fracture along the intertrochanteric line of the proximal left femur,   with slight upward and lateral migration of the distal segment. There is   no dislocation of the left femur head relative to the acetabulum. The   hips are otherwise unremarkable. There is no AVN. Bowel gas and stool   obscures visualization of the sacrum and SI joints however there is no   diastases seen. The symphysis pubis is grossly intact. Both pubic rings   are preserved.    IMPRESSION:  1. Diffuse bony demineralization with a fracture along the   intertrochanteric line of the proximal left femur, as detailed above. No   dislocation of the hips.    --- End of Report ---    JORDI NGUYEN MD; Attending Radiologist  This document has been electronically signed. Apr 10 2024  8:14AM    < end of copied text >    < from: CT Pelvis Bony Only No Cont (04.09.24 @ 17:07) >    ACC: [id number] EXAM:  CT PELVIS BONY ONLY   ORDERED BY: SAMEER KLEIN     PROCEDURE DATE:  04/09/2024      INTERPRETATION:  INDICATION:  left hip pain s/p fall    TECHNIQUE: CT of the pelvis without contrast with axial, sagittal, and   coronal multiplanar reformats.    Comparison:None available    FINDINGS:    Bones are diffusely demineralized.    Acute displaced, slightly comminuted fracture of the intertrochanteric   region of the proximal left femur with extension into the basicervical   and transcervical region of the left femoral neck superolaterally. Left   femoral head remains seated within the acetabulum. Bilateral hip joint   spaces are relatively preserved.    Age-indeterminate severe compression fracture of L5.    Degenerative changes within the visualized lower lumbar spine and   sacroiliac joints.    Soft tissue swelling/hematoma about the left hip fracture site. Stool   throughout the colon. Atherosclerotic calcifications are noted.    IMPRESSION:    Acute proximal left femoral fracture, as above.    Age-indeterminate severe compression fracture of the L5 vertebral body.    --- End of Report ---    EVONNE ADAMS M.D., ATTENDING RADIOLOGIST  This document has been electronically signed. Apr 9 2024  5:26PM    < end of copied text >    ORT Score -   Family Hx of substance abuse	Female	      Male  Alcohol 	                                           1                     3  Illegal drugs	                                   2                     3  Rx drugs                                           4 	                  4  Personal Hx of substance abuse		  Alcohol 	                                          3	                  3  Illegal drugs                                     4	                  4  Rx drugs                                            5 	                  5  Age between 16- 45 years	           1                     1  hx preadolescent sexual abuse	   3 	                  0  Psychological disease		  ADD, OCD, bipolar, schizophrenia   2	          2  Depression/anxiety                                1 	          1  Total: 6    a score of 3 or lower indicates low risk for opioid abuse		  a score of 4-7 indicates moderate risk for opioid abuse		  a score of 8 or higher indicates high risk for opioid abuse    REVIEW OF SYSTEMS:  CONSTITUTIONAL: No fever or fatigue  HEENT:  No difficulty hearing, no change in vision  NECK: No pain or stiffness  RESPIRATORY: No cough, wheezing, chills or hemoptysis; No shortness of breath  CARDIOVASCULAR: No chest pain, palpitations, dizziness, or leg swelling  GASTROINTESTINAL: No loss of appetite, decreased PO intake. No abdominal or epigastric pain. No nausea, vomiting; No diarrhea or constipation.   GENITOURINARY: No dysuria, frequency, hematuria, retention, + incontinence  MUSCULOSKELETAL: + back pain; +left hip pain, + falls   NEURO: No headaches, No numbness/tingling b/l LE, No weakness  ENDOCRINE: No polyuria, polydipsia, heat or cold intolerance; No hair loss  PSYCHIATRIC: No depression, Hx anxiety, no difficulty sleeping    PHYSICAL EXAM:  GENERAL:  Alert & Oriented X 4, cooperative, NAD, Good concentration. Speech is clear.   RESPIRATORY: Respirations even and unlabored. Clear to auscultation bilaterally; No rales, rhonchi, wheezing, or rubs, on 2L NC  CARDIOVASCULAR: Normal S1/S2, regular rate and rhythm; No murmurs, rubs, or gallops. No JVD.   GASTROINTESTINAL:  Soft, Nontender, Nondistended; Bowel sounds present  PERIPHERAL VASCULAR:  Extremities warm without edema. 2+ Peripheral Pulses, No cyanosis, No calf tenderness  MUSCULOSKELETAL: Motor Strength 5/5 B/L upper and 3/5 lower extremities; moves all extremities equally against gravity; ROM decreased to LLE; + tenderness on palpation of left hip and lower back.  SKIN: Warm, dry, intact. No rashes, lesions, scars or wounds.     Risk factors associated with adverse outcomes related to opioid treatment  [X]  Concurrent benzodiazepine use  [X]  History/ Active substance use or alcohol use disorder  [x] Psychiatric co-morbidity  [ ] Sleep apnea  [x] COPD  [ ] BMI> 35  [ ] Liver dysfunction  [ ] Renal dysfunction  [ ] CHF  [x] Smoker  [x]  Age > 60 years    [x]  [place]  Reviewed and Copied to Chart. See below.      Plan of care and goal oriented pain management treatment options were discussed with patient and /or primary care giver; all questions and concerns were addressed and care was aligned with patient's wishes.    Educated patient on goal oriented pain management treatment options     04-13-24 @ 16:08

## 2024-04-13 NOTE — CONSULT NOTE ADULT - CONSULT REASON
L hip fx
Left hip pain
SOB due to Acute hypoxic respiratory failure due to COPD and Pneumonia
Pneumonia
Weakness, multiple falls
hyponatremia
Left hip fracture s/p fall.  PNA
Left hip pain

## 2024-04-13 NOTE — DISCHARGE NOTE PROVIDER - DETAILS OF MALNUTRITION DIAGNOSIS/DIAGNOSES
This patient has been assessed with a concern for Malnutrition and was treated during this hospitalization for the following Nutrition diagnosis/diagnoses:     -  04/17/2024: Severe protein-calorie malnutrition   -  04/17/2024: Underweight (BMI < 19)

## 2024-04-13 NOTE — PROGRESS NOTE ADULT - ASSESSMENT
Confidential Drug Utilization Report  Search Terms: ROXY VASQUEZ, 1950   Search Date: 04/13/2024 16:10:57 PM  The Drug Utilization Report below displays all of the controlled substance prescriptions, if any, that your patient has filled in the last twelve months. The information displayed on this report is compiled from pharmacy submissions to the Department, and accurately reflects the information as submitted by the pharmacies.    This report was requested by: Olya White | Reference #: [id number]    You have not added a TIM number. Keeping your TIM number(s) up to date on the My TIM # tab will enable the separation of your prescriptions from others in the search results.    Practitioner Count: 2  Pharmacy Count: 1  Current Opioid Prescriptions: 2  Current Benzodiazepine Prescriptions: 0  Current Stimulant Prescriptions: 0      Patient Demographic Information (PDI)       PDI	First Name	Last Name	Birth Date	Gender	Street Address	[facility]	Zip Code  OSVALDO Vasquez	1950	Female	[phone]	[facility]	[address]    Prescription Information      PDI Filter:    PDI	Current Rx	Drug Type	Rx Written	Rx Dispensed	Drug	Quantity	Days Supply	Prescriber Name	Prescriber TIM #  A	Y	O	03/27/2024	03/30/2024	oxycodone-acetaminophen  mg tab	150	30	SheRanjith silver	[id number]  Payment Method Cash  [facility]  A	Y	O	03/27/2024	03/28/2024	morphine sulf er 60 mg tablet	60	30	SheflRanjith sebastian	[id number]  Payment Method Cash  [facility]  A	N	B	03/27/2024	03/27/2024	alprazolam 1 mg tablet	60	15	SheRanjith silver	[id number]  Payment Method Insurance  [facility]  A	N	O	02/26/2024	03/01/2024	oxycodone-acetaminophen  mg tablet	150	30	SheRanjith silver	[id number]  Payment Method Abdi  [facility]  A	N	B	02/26/2024	02/28/2024	alprazolam 1 mg tablet	60	15	SheRanjith silver	[id number]  Payment Method Insurance  [facility]  A	N	O	02/26/2024	02/28/2024	morphine sulf er 60 mg tablet	60	30	SheflRanjith sebastian	[id number]  [facility] Cash  [facility]  A	N	B	01/29/2024	02/01/2024	alprazolam 1 mg tablet	60	30	SheflRanjith sebastian	[id number]  Payment Method Insurance  [facility]  A	N	O	01/29/2024	02/01/2024	oxycodone-acetaminophen  mg tab	150	30	SheflinRanjith	[id number]  Payment Method Cash  [facility]  A	N	B	01/02/2024	01/02/2024	alprazolam 1 mg tablet	60	30	Sheflin, Ranjith CASIANO	[id number]  Payment Method Insurance  [facility]  A	N	O	01/02/2024	01/02/2024	oxycodone-acetaminophen  mg tab	150	30	Sheflin, Ranjith CASIANO	[id number]  Payment Method Cash  [facility]  A	N	S	11/27/2023	11/29/2023	dextroamp-amphetamin 20 mg tab	60	30	SheflinRanjith	[id number]  Payment Method Insurance  [facility]  A	N	O	11/27/2023	11/29/2023	morphine sulf er 60 mg tablet	60	30	Sheflin, Ranjith CASIANO	[id number]  Payment Method Cash  [facility]  A	N	O	11/27/2023	11/29/2023	oxycodone-acetaminophen  mg tab	150	30	SheflinRanjith	[id number]  Payment Method Insurance  [facility]  A	N	B	11/08/2023	11/27/2023	alprazolam 0.25 mg tablet	2	1	Benny Lundberg B, DO	[id number]  Payment Method Insurance  [facility]  A	N	B	11/27/2023	11/27/2023	alprazolam 1 mg tablet	60	15	SheflinRanjith	[id number]  Payment Method Insurance  [facility]  A	N	B	10/30/2023	10/31/2023	alprazolam 1 mg tablet	60	15	SheflinRanjith	[id number]  Payment Method Insurance  [facility]  A	N	S	10/30/2023	10/31/2023	dextroamp-amphetamin 20 mg tab	60	30	Sheflin, Ranjith CASIANO	[id number]  Payment Method Insurance  [facility]  A	N	O	10/30/2023	10/31/2023	morphine sulf er 60 mg tablet	60	30	Sheflin, Ranjith CASIANO	[id number]  Payment Method Cash  [facility]  A	N	O	10/30/2023	10/31/2023	oxycodone-acetaminophen  mg tab	150	30	Sheflin, Ranjith CASIANO	[id number]  Payment Method Insurance  [facility]  A	N	B	10/02/2023	10/02/2023	alprazolam 1 mg tablet	60	15	SheflinRanjith	[id number]  Payment Method Insurance  [facility]  A	N	O	10/02/2023	10/02/2023	oxycodone-acetaminophen  mg tab	150	30	SheflRanjith sebastian	[id number]  Payment Method Insurance  [facility]  A	N	O	09/01/2023	09/30/2023	oxycodone-acetaminophen  mg tab	150	30	SheflinRanjith	[id number]  Payment Method Insurance  [facility]  A	N	B	09/01/2023	09/30/2023	alprazolam 1 mg tablet	60	15	Sheflin, Ranjith CASIANO	[id number]  Payment Method Insurance  [facility]  A	N	O	09/01/2023	09/30/2023	morphine sulf er 60 mg tablet	60	30	Sheflin, Ranjith CASIANO	[id number]  Payment Method Cash  [facility]  A	N	S	09/01/2023	09/30/2023	dextroamp-amphetamin 20 mg tab	60	30	SheflinRanjith	[id number]  Payment Method Insurance  [facility]  A	N	B	07/31/2023	08/04/2023	alprazolam 1 mg tablet	60	15	SheflinRanjith	[id number]  Payment Method Insurance  [facility]  A	N	O	07/31/2023	08/04/2023	oxycodone-acetaminophen  mg tab	150	30	Sheflin, Ranjith	[id number]  Payment Method Insurance  [facility]  A	N	S	07/31/2023	08/01/2023	dextroamp-amphetamin 20 mg tab	60	30	Sheflin, Ranjith	[id number]  Payment Method Insurance  [facility]  A	N	O	07/31/2023	08/01/2023	morphine sulf er 60 mg tablet	60	30	Sheflin, Ranjith	[id number]  Payment Method Abdi  [facility]  A	N	B	07/01/2023	07/05/2023	alprazolam 1 mg tablet	60	15	Sheflin, Ranjith CASIANO	[id number]  Payment Method Insurance  [facility]  A	N	O	07/01/2023	07/05/2023	oxycodone-acetaminophen  mg tab	150	30	Sheflin, Ranjith CASIANO	[id number]  Payment Method Insurance  [facility]  A	N	S	05/05/2023	06/05/2023	dextroamp-amphetamin 20 mg tab	60	30	Sheflin, Ranjith	[id number]  Payment Method Insurance  [facility]  A	N	B	06/05/2023	06/05/2023	alprazolam 1 mg tablet	60	30	Sheflin, Ranjith	[id number]  Payment Method Insurance  [facility]  A	N	O	06/05/2023	06/05/2023	oxycodone-acetaminophen  mg tab	150	30	Sheflin, Ranjith	[id number]  Payment Method Insurance  [facility]  A	N	S	04/05/2023	05/05/2023	dextroamp-amphetamin 20 mg tab	60	30	Sheflin, Ranjith	[id number]  Payment Method Insurance  [facility]  A	N	B	05/05/2023	05/05/2023	alprazolam 1 mg tablet	60	15	Sheflin, Ranjith	[id number]  Payment Method Insurance  [facility]  A	N	O	05/05/2023	05/05/2023	oxycodone-acetaminophen  mg tab	150	30	Sheflin, Ranjith CASIANO	[id number]  Payment Method Insurance  [facility]    * - Details of Drug Type : O = Opioid, B = Benzodiazepine, S = Stimulant    * - Drugs marked with an asterisk are compound drugs. If the compound drug is made up of more than one controlled substance, then each controlled substance will be a separate row in the table.

## 2024-04-13 NOTE — DISCHARGE NOTE PROVIDER - CARE PROVIDERS DIRECT ADDRESSES
,[email or web address],[email or web address] ,[email or web address],DirectAddress_Unknown,DirectAddress_Unknown,DirectAddress_Unknown

## 2024-04-13 NOTE — PROGRESS NOTE ADULT - PROBLEM SELECTOR PLAN 1
(M75.42) Impingement syndrome, shoulder, left  (primary encounter diagnosis)  Comment:      Plan:  CODMAN'S EXERCISES  ,THAT IS PENDULUM RANGE OF MOTION 30 EACH TWICE DAILY    THUMB UP EXERCISES INTO PAIN 30 EACH TWICE DAILY    INTERNAL  AND EXTERNAL ROTATION  with AND WITHOUT RESISTANCE OR WEIGHT 30 EACH TWICE DAILY    SWORD SHEATH EXERCISE 30 EACH TWICE DAILY    WALL STRETCH EXERCISE 30 SECONDS EACH TWICE DAILY    POSTERIOR SHOULDER STRETCH AND HOLD 3 10 SECOND STRETCHES TWICE DAILY    ISOMETRIC EXERCISE 60 DEGREES ABDUCTION, ADDUCTION, 90 DEGREE THUMB UP POSITION    AVOID PAINFUL ARC    ICE TWICE DAILY X 5 MINUTES PRIOR TO EXERCISE OR AS NEEDED FOR PAIN CONTROL  TREATMENT PROGNOSIS BENEFITS AND RISKS DISCUSSED   ANATOMIC SITE:  KENALOG 60MG   PROCEDURE:  LEFT POSTERIOR   BETADYNE CLEANSING WITHOUT COMPLICATION   1:1 MIXTURE KENALOG 1% LIDOCAINE WITHOUT COMPLICATION   AMOUNT OF KENALOMG   NDC NUMBER KENALOG:  [id number]  LIDOCAINE 43603-695-40  INJECTION WITH NO TOUCH TECHNIQUE  SIDE EFFECTS BENEFITS AND RISKS DISCUSSED    TREATMENT PROGNOSIS BENEFITS AND RISKS DISCUSSED   MONITOR FOR ALLERGIC AND VASCULAR SIDE EFFECTS  MEDICATION RISKS SIDE EFFECTS BENEFITS AND RISKS DISCUSSED   ELLIOT MYERS JR., MD   19            (I10) Essential hypertension  Comment:    Plan: amLODIPine (NORVASC) 10 MG tablet             (Z12.11) Screen for colon cancer  Comment:    Plan:      (Z11.4) Screening for HIV (human immunodeficiency virus)  Comment:    Plan: HIV Screening             (E78.5) Hyperlipidemia LDL goal <130  Comment:    Plan: Lipid panel reflex to direct LDL Fasting             (G25.81) Restless leg syndrome  Comment:    Plan: carbidopa-levodopa (SINEMET)  MG tablet                    - s/p fall x 3 since easter   - CT pelvis shows Acute proximal left femoral fracture  - ortho following -- recommending left hip IM nailing- pt declined surgery  - as per neuro -- MR did not show anything that precludes an orthopedic procedure, ortho made aware

## 2024-04-13 NOTE — PROGRESS NOTE ADULT - PROBLEM SELECTOR PLAN 1
Pt with acute left hip pain which is somatic and neuropathic in nature due to left femoral fracture. Pt also with lower back pain - CTAP 4/11 demonstrated moderate to severe L1, L2, L4 and L5 compression fractures. CT pelvis shows acute proximal left femoral fracture, Ortho consulted, recommends left hip IM nailing- pt is being optimized for surgery. Hx of chronic opioid use. High risk medications reviewed. Avoid polypharmacy. Avoid IV opioids. Avoid NSAIDs and Benzodiazepines. Non-pharmacological sleep aides maintained Non-opioid medications and non-pharmacological pain management measures maintained.   Opioid pain recommendations   - Continue Oxycodone 5-10 mg PO q 4 hours PRN moderate-severe pain. Monitor for sedation/ respiratory depression.   - Continue Morphine ER 60 mg po BID (home dose) if no contraindications).  Non-opioid pain recommendations   - Acetaminophen 1 gram PO q 8 hours x 3 days. (4/18) Monitor LFTs  - Gabapentin 100 mg po q8h. Monitor renal function.  - Continue Lidocaine 4% patches.  - Continue Flexeril 5 mg po q8h PRN Monitor for increased somnolence, dizziness.  Bowel Regimen  - Continue Miralax 17G PO daily  - Continue Senna 2 tablets at bedtime for constipation  Mild pain   - Non-pharmacological pain treatment recommendations  - Warm/ Cool packs PRN   - Repositioning, guided imagery, relaxation, distraction.  - Physical therapy OOB if no contraindications   Recommendations discussed with primary team and RN.

## 2024-04-13 NOTE — PROGRESS NOTE ADULT - SUBJECTIVE AND OBJECTIVE BOX
Patient is a 73y old  Female who presents with a chief complaint of L hip fracture , pt refusing hip surgery stating that she is not ready   4/13/24    MEDICATIONS  (STANDING):  acetaminophen     Tablet .. 1000 milliGRAM(s) Oral every 8 hours  albuterol/ipratropium for Nebulization 3 milliLiter(s) Nebulizer every 6 hours  budesonide 160 MICROgram(s)/formoterol 4.5 MICROgram(s) Inhaler 2 Puff(s) Inhalation two times a day  chlorhexidine 2% Cloths 1 Application(s) Topical <User Schedule>  gabapentin 100 milliGRAM(s) Oral every 8 hours  guaiFENesin Oral Liquid (Sugar-Free) 200 milliGRAM(s) Oral every 6 hours  heparin   Injectable 5000 Unit(s) SubCutaneous every 8 hours  levothyroxine 112 MICROGram(s) Oral daily  lidocaine   4% Patch 2 Patch Transdermal daily  losartan 25 milliGRAM(s) Oral daily  naloxone Injectable 0.4 milliGRAM(s) IV Push once  nicotine -   7 mG/24Hr(s) Patch 1 Patch Transdermal daily  piperacillin/tazobactam IVPB.. 3.375 Gram(s) IV Intermittent every 8 hours  polyethylene glycol 3350 17 Gram(s) Oral daily  predniSONE   Tablet 40 milliGRAM(s) Oral daily  senna 2 Tablet(s) Oral at bedtime    MEDICATIONS  (PRN):  albuterol    90 MICROgram(s) HFA Inhaler 2 Puff(s) Inhalation every 6 hours PRN Bronchospasm  ALPRAZolam 1 milliGRAM(s) Oral every 12 hours PRN Anxiety  benzonatate 100 milliGRAM(s) Oral every 8 hours PRN Cough  bisacodyl 5 milliGRAM(s) Oral daily PRN Constipation  cyclobenzaprine 5 milliGRAM(s) Oral every 8 hours PRN Spasm  oxyCODONE    IR 5 milliGRAM(s) Oral every 4 hours PRN Moderate Pain (4 - 6)  oxyCODONE    IR 10 milliGRAM(s) Oral every 4 hours PRN Severe Pain (7 - 10)    __________________________________________________  REVIEW OF SYSTEMS:  " not too good"  CONSTITUTIONAL: No fever,   EYES: no acute visual disturbances  NECK: No pain or stiffness  RESPIRATORY: No cough; No shortness of breath  CARDIOVASCULAR: No chest pain, no palpitations  GASTROINTESTINAL: No pain. No nausea or vomiting; No diarrhea   NEUROLOGICAL: No headache or numbness, no tremors  MUSCULOSKELETAL: No joint pain, no muscle pain  GENITOURINARY: no dysuria, no frequency, no hesitancy  ALL OTHER  ROS negative      Vital Signs Last 24 Hrs  T(C): 36.9 (04-13-24 @ 05:14), Max: 36.9 (04-12-24 @ 20:41)  T(F): 98.4 (04-13-24 @ 05:14), Max: 98.4 (04-12-24 @ 20:41)  HR: 86 (04-13-24 @ 05:14) (86 - 91)  BP: 169/96 (04-13-24 @ 05:14) (138/84 - 175/92)  BP(mean): 102 (04-12-24 @ 14:19) (102 - 102)  RR: 20 (04-13-24 @ 05:14) (19 - 20)  SpO2: 94% (04-13-24 @ 05:14) (93% - 94%)      ________________________________________________  PHYSICAL EXAM:  GENERAL: NAD, frail  HEENT: Normocephalic; conjunctivae and sclerae clear; moist mucous membranes;   NECK : supple  CHEST/LUNG: Diminished , rhonchi + ant  HEART: S1 S2  regular;  ABDOMEN: Soft, Nontender, Nondistended; Bowel sounds present  EXTREMITIES: no cyanosis; no edema; no calf tenderness  SKIN: warm and dry; no rash  NERVOUS SYSTEM:  Awake and alert; Oriented to place, person and time    _________________________________________________  LABS:                        11.6   8.42  )-----------( 188      ( 12 Apr 2024 07:37 )             34.9     LABS:  04-13    136  |  99  |  9   ----------------------------<  143<H>  3.2<L>   |  30  |  0.42<L>    Ca    8.8      13 Apr 2024 06:15  Phos  2.4     04-13  Mg     2.3     04-13    TPro  7.1  /  Alb  3.0<L>  /  TBili  0.7  /  DBili      /  AST  24  /  ALT  20  /  AlkPhos  108  04-13    Creatinine Trend: 0.42 <--, 0.40 <--, 0.46 <--, 0.40 <--, 0.43 <--, 0.47 <--                        13.4   12.28 )-----------( 227      ( 13 Apr 2024 06:15 )             39.8     Urine Studies:  Urinalysis Basic - ( 13 Apr 2024 06:15 )    Color:  / Appearance:  / SG:  / pH:   Gluc: 143 mg/dL / Ketone:   / Bili:  / Urobili:    Blood:  / Protein:  / Nitrite:    Leuk Esterase:  / RBC:  / WBC    Sq Epi:  / Non Sq Epi:  / Bacteria:       Sodium, Random Urine: 28 mmol/L (04-11 @ 19:45)  Osmolality, Random Urine: 161 mos/kg (04-11 @ 19:45)  Sodium, Random Urine: 42 mmol/L (04-11 @ 16:35)  Creatinine, Random Urine: 53 mg/dL (04-11 @ 16:35)  Protein/Creatinine Ratio Calculation: 0.6 Ratio (04-11 @ 16:35)  Potassium, Random Urine: 39 mmol/L (04-11 @ 16:35)  Osmolality, Random Urine: 279 mos/kg (04-11 @ 06:30)          LIVER FUNCTIONS - ( 13 Apr 2024 06:15 )  Alb: 3.0 g/dL / Pro: 7.1 g/dL / ALK PHOS: 108 U/L / ALT: 20 U/L DA / AST: 24 U/L / GGT: x                   RADIOLOGY & ADDITIONAL TESTS:  < from: MR Thoracic Spine No Cont (04.11.24 @ 19:15) >    ACC: [id number] EXAM:  MR BRAIN   ORDERED BY: TAMMI CHRISTIE     ACC: [id number] EXAM:  MR SPINE THORACIC   ORDERED BY: TAMMI CHRISTIE     ACC: [id number] EXAM:  MR SPINE CERVICAL   ORDERED BY: TAMMI CHRISTIE     PROCEDURE DATE:  04/11/2024          INTERPRETATION:  Three examinations were performed in this patient:  1.  MR of the brain without gadolinium contrast  2.  MR cervical spine without gadolinium contrast  3.  MR thoracic spine without gadolinium contrast    CLINICAL INFORMATION: Post fall trauma cord compression  HMR  Admitting   Dxs: S72.009A FRACTURE OF UNSP PART OF NECK OF UNSP FEMUR, INIT    TECHNIQUE:  1. MR brain:  Sagittal and axial T1-weighted images, axial FLAIR images,   axial susceptibility weighted images, axial T2-weighted images and axial   diffusion weighted images of the brain were obtained.  2.  MR cervical spine:   Sagittal T2-weighted images were obtained.  3.  MR thoracic spine:   Sagittal T2-weighted images were obtained.  The remainder these examinations could not be completed, not tolerated by   the patient. The images that could be obtained are degraded by patient   motion.  CONTRAST:    None    COMPARISON:   CT head 4/10/2023 and CT cervical and CT chest 4/9/2023    FINDINGS:    BRAIN    BRAIN:   The brain demonstrates small focal lesions of remote deep   infarction. The most conspicuous are noted in the external capsules. No   cerebral cortical lesion is recognized.   No diffusion restriction is   found in the brain.  No acute cerebral cortical infarct is found.   No   intracranial hemorrhage is recognized.  No mass effect is found in the   brain.   The brain also demonstrates patchy indistinct lesions within the   deep cerebral hemispheric white matter typical for ischemic white matter   disease.    CSF SPACES:   The ventricles, sulci and basal cisterns appear moderately   dilated reflecting diffuse brain volume loss.    VESSELS:   The vertebral and internal carotid arteries demonstrate   expected flow voids indicating their patency.    HEAD AND NECK STRUCTURES:   The orbits are unremarkable.  Paranasal   sinuses are clear.  The nasal cavity demonstrates left-to-right septal   deviation. Left middle turbinate tristen bullosa is present..  The central   skull base appears intact.  The nasopharynx is symmetric.  The temporal   bones appear clear of disease.  The calvarium appears unremarkable.    CERVICAL SPINE    Cervical vertebral alignment is significant for grade 1 anterior   listhesis C4 on C5, also slight retrolisthesis C5 on C6.   Facet joints   appear aligned.   Cervical vertebral body heights are maintained.   Cervical vertebral marrow signal intensity mildly heterogeneous from   degenerative endplate changes. These include small marginal osteophytes   most prominently at C5-C6.  No osseous expansion or epidural disease is   found.  No destructive bone lesion is found.   There are osteoarthritic   changes at the articulation of the anterior ring of C1 and the odontoid   process of C2.  This arthropathy includes marginal osteophytes,   subchondral sclerosis and joint space narrowing.    Cervical intervertebral disc spaces demonstrate a generalized pattern of   degeneration characterized by signal intensity loss on the long TR   images. Degenerative disc heightloss appears greatest at C5-C6.   Evaluation of degenerative disc disease is limited in the absence of   axial images.   However, no degenerative high-grade central canal   compromise is recognized.  Neural foramina appear narrowed by disc   materialand uncovertebral joint osteophytes most prominently at the   lower cervical intervertebral disc levels..    Cervical cord morphology Is preserved.  The cervical cord maintains   intact signal intensity   No focal intrinsic cord lesion is identified.   No cord expansion or cord volume loss is recognized.    The visualized adjacent neck structures appear intact.  No neck mass is   recognized.  Paraspinal soft tissues appear intact.  Visualized lymph   nodes appear to be within physiologic size limits.    THORACIC SPINE    Thoracic vertebral alignment demonstrates exaggeration of the mid   thoracic kyphosis. This vertebral malalignment has an apex at the T7   inferior endplate compression fracture. Approximately 70% height loss   results at this level. More limited compression fractures involve the T6   inferior endplate and the T9 superior endplate. Schmorl's nodes result.   More limited superior endplate deformity of T5 and T11. The remaining   thoracic vertebral body heights are maintained. Superior endplate   deformities are also present at L1-L2 and L4. At the L2 vertebral body   burst fracture pattern is present with impression upon the ventral thecal   sac but does not appear to compromise the cauda equina. At the thoracic   vertebral fracture is no high-grade central canal compromise is   recognized. Thoracic vertebral marrow signal intensity relatively   homogeneous without recognition of significant marrow edema.  No   destructive bone lesion, osseous expansion or epidural disease is found.    The paraspinal soft tissues appear intact.    Thoracic intervertebral disc spaces demonstrate a generalized pattern of   at least mild degeneration with signal intensity loss on the long TR   images. Disc heights are largely maintained.   No high-grade degenerative   central canal compromise is recognized. Thoracic neural foramina appear   intact.    The thoracic cord morphology is preserved.  The thoracic cord maintains   intact signal intensity.      IMPRESSION:    1. BRAIN:   Basal ganglia lacunar infarctions. Ischemic white matter   disease and atrophy typical for age    2. CERVICAL SPINE:   Grade 1 anterolisthesis C4 on C5. Low-grade cervical   degenerative disc disease. Cervical cord appears intact.    3. THORACIC SPINE:   Exaggeration in mid thoracic kyphosis with multiple   thoracic endplate deformities that are age indeterminate but felt likely   to be long-standing. Thoracic cord appears intact.    4. Only limited incomplete examinations could be performed on this date.   Recommend completion scans once tolerated by the patient.    --- End of Report ---            GRAZYNA CRAWLEY MD; Attending Radiologist  This document has been electronically signed. Apr 11 2024  8:02PM    < end of copied text >    Imaging Personally Reviewed:  YES/NO    Consultant(s) Notes Reviewed:   YES/ No    Care Discussed with Consultants :     Plan of care was discussed with patient and /or primary care giver; all questions and concerns were addressed and care was aligned with patient's wishes.

## 2024-04-13 NOTE — DISCHARGE NOTE PROVIDER - NSDCCPTREATMENT_GEN_ALL_CORE_FT
PRINCIPAL PROCEDURE  Procedure: Insertion of locking intramedullary janneth into left hip  Findings and Treatment: S/p Left hip intramedullary nailing on 4/16/24

## 2024-04-13 NOTE — DISCHARGE NOTE PROVIDER - CARE PROVIDER_API CALL
Arnulfo Villatoro  Internal Medicine  [address] [facility], [address]  [place] [address]  Phone: [phone]  Fax: [phone]  Follow Up Time: 2 weeks    Clovis Muro  Internal Medicine  [address] [place] [address]  Phone: [phone]  Fax: [phone]  Follow Up Time: 2 weeks   Arnulfo Villatoro  Internal Medicine  [address] [facility], [address]  [place] [address]  Phone: [phone]  Fax: [phone]  Follow Up Time: 2 weeks    Clovis Muro  Internal Medicine  [address] [place] [address]  Phone: [phone]  Fax: [phone]  Follow Up Time: 2 weeks    Ac Lang  Orthopaedic Surgery  [address] [place] [address]  Phone: [phone]  Fax: [phone]  Follow Up Time: 2 weeks   Arunlfo Villatoro  Internal Medicine  [address] [facility], [address]  [place] [address]  Phone: [phone]  Fax: [phone]  Follow Up Time: 2 weeks    Clovis Muro  Internal Medicine  [address] [address] [place] [address]  Phone: [phone]  Fax: [phone]  Follow Up Time: 2 weeks    Ac Lang  Orthopaedic Surgery  [address] [place] [address]  Phone: [phone]  Fax: [phone]  Follow Up Time: 2 weeks    Donna Graham  Pulmonary Disease  [address] [facility], [address]  [place] [address]  Phone: [phone]  Fax: [phone]  Follow Up Time: 2 weeks    Torrey Fernandez  Critical Care Medicine  [address] [facility], [address]  [place] [address]  Phone: [phone]  Fax: [phone]  Follow Up Time: 2 weeks

## 2024-04-14 LAB
ALBUMIN SERPL ELPH-MCNC: 2.9 G/DL — LOW (ref 3.5–5)
ALP SERPL-CCNC: 109 U/L — SIGNIFICANT CHANGE UP (ref 40–120)
ALT FLD-CCNC: 20 U/L DA — SIGNIFICANT CHANGE UP (ref 10–60)
ANION GAP SERPL CALC-SCNC: 5 MMOL/L — SIGNIFICANT CHANGE UP (ref 5–17)
AST SERPL-CCNC: 19 U/L — SIGNIFICANT CHANGE UP (ref 10–40)
BASOPHILS # BLD AUTO: 0.04 K/UL — SIGNIFICANT CHANGE UP (ref 0–0.2)
BASOPHILS NFR BLD AUTO: 0.4 % — SIGNIFICANT CHANGE UP (ref 0–2)
BILIRUB SERPL-MCNC: 0.7 MG/DL — SIGNIFICANT CHANGE UP (ref 0.2–1.2)
BUN SERPL-MCNC: 10 MG/DL — SIGNIFICANT CHANGE UP (ref 7–18)
CALCIUM SERPL-MCNC: 8.5 MG/DL — SIGNIFICANT CHANGE UP (ref 8.4–10.5)
CHLORIDE SERPL-SCNC: 98 MMOL/L — SIGNIFICANT CHANGE UP (ref 96–108)
CO2 SERPL-SCNC: 34 MMOL/L — HIGH (ref 22–31)
COPPER SERPL-MCNC: 140 UG/DL — SIGNIFICANT CHANGE UP (ref 80–158)
CREAT SERPL-MCNC: 0.45 MG/DL — LOW (ref 0.5–1.3)
EGFR: 101 ML/MIN/1.73M2 — SIGNIFICANT CHANGE UP
EOSINOPHIL # BLD AUTO: 0.34 K/UL — SIGNIFICANT CHANGE UP (ref 0–0.5)
EOSINOPHIL NFR BLD AUTO: 3.3 % — SIGNIFICANT CHANGE UP (ref 0–6)
GLUCOSE SERPL-MCNC: 104 MG/DL — HIGH (ref 70–99)
HCT VFR BLD CALC: 37.7 % — SIGNIFICANT CHANGE UP (ref 34.5–45)
HGB BLD-MCNC: 12.6 G/DL — SIGNIFICANT CHANGE UP (ref 11.5–15.5)
IMM GRANULOCYTES NFR BLD AUTO: 0.3 % — SIGNIFICANT CHANGE UP (ref 0–0.9)
LYMPHOCYTES # BLD AUTO: 1.36 K/UL — SIGNIFICANT CHANGE UP (ref 1–3.3)
LYMPHOCYTES # BLD AUTO: 13.4 % — SIGNIFICANT CHANGE UP (ref 13–44)
MAGNESIUM SERPL-MCNC: 2.2 MG/DL — SIGNIFICANT CHANGE UP (ref 1.6–2.6)
MCHC RBC-ENTMCNC: 31.4 PG — SIGNIFICANT CHANGE UP (ref 27–34)
MCHC RBC-ENTMCNC: 33.4 GM/DL — SIGNIFICANT CHANGE UP (ref 32–36)
MCV RBC AUTO: 94 FL — SIGNIFICANT CHANGE UP (ref 80–100)
MONOCYTES # BLD AUTO: 0.94 K/UL — HIGH (ref 0–0.9)
MONOCYTES NFR BLD AUTO: 9.3 % — SIGNIFICANT CHANGE UP (ref 2–14)
NEUTROPHILS # BLD AUTO: 7.44 K/UL — HIGH (ref 1.8–7.4)
NEUTROPHILS NFR BLD AUTO: 73.3 % — SIGNIFICANT CHANGE UP (ref 43–77)
NRBC # BLD: 0 /100 WBCS — SIGNIFICANT CHANGE UP (ref 0–0)
PHOSPHATE SERPL-MCNC: 2.9 MG/DL — SIGNIFICANT CHANGE UP (ref 2.5–4.5)
PLATELET # BLD AUTO: 233 K/UL — SIGNIFICANT CHANGE UP (ref 150–400)
POTASSIUM SERPL-MCNC: 3.3 MMOL/L — LOW (ref 3.5–5.3)
POTASSIUM SERPL-SCNC: 3.3 MMOL/L — LOW (ref 3.5–5.3)
PROT SERPL-MCNC: 6.7 G/DL — SIGNIFICANT CHANGE UP (ref 6–8.3)
RBC # BLD: 4.01 M/UL — SIGNIFICANT CHANGE UP (ref 3.8–5.2)
RBC # FLD: 13 % — SIGNIFICANT CHANGE UP (ref 10.3–14.5)
SODIUM SERPL-SCNC: 137 MMOL/L — SIGNIFICANT CHANGE UP (ref 135–145)
WBC # BLD: 10.15 K/UL — SIGNIFICANT CHANGE UP (ref 3.8–10.5)
WBC # FLD AUTO: 10.15 K/UL — SIGNIFICANT CHANGE UP (ref 3.8–10.5)

## 2024-04-14 RX ORDER — ACETAMINOPHEN 500 MG
1000 TABLET ORAL EVERY 8 HOURS
Refills: 0 | Status: DISCONTINUED | OUTPATIENT
Start: 2024-04-14 | End: 2024-04-16

## 2024-04-14 RX ORDER — POTASSIUM CHLORIDE 20 MEQ
40 PACKET (EA) ORAL ONCE
Refills: 0 | Status: COMPLETED | OUTPATIENT
Start: 2024-04-14 | End: 2024-04-14

## 2024-04-14 RX ADMIN — Medication 1 PATCH: at 16:23

## 2024-04-14 RX ADMIN — Medication 1 PATCH: at 11:13

## 2024-04-14 RX ADMIN — Medication 1 PATCH: at 07:25

## 2024-04-14 RX ADMIN — GABAPENTIN 100 MILLIGRAM(S): 400 CAPSULE ORAL at 13:03

## 2024-04-14 RX ADMIN — LIDOCAINE 2 PATCH: 4 CREAM TOPICAL at 11:13

## 2024-04-14 RX ADMIN — OXYCODONE HYDROCHLORIDE 5 MILLIGRAM(S): 5 TABLET ORAL at 22:15

## 2024-04-14 RX ADMIN — Medication 3 MILLILITER(S): at 20:12

## 2024-04-14 RX ADMIN — Medication 1 PATCH: at 12:24

## 2024-04-14 RX ADMIN — OXYCODONE HYDROCHLORIDE 10 MILLIGRAM(S): 5 TABLET ORAL at 21:05

## 2024-04-14 RX ADMIN — OXYCODONE HYDROCHLORIDE 10 MILLIGRAM(S): 5 TABLET ORAL at 05:37

## 2024-04-14 RX ADMIN — HEPARIN SODIUM 5000 UNIT(S): 5000 INJECTION INTRAVENOUS; SUBCUTANEOUS at 13:03

## 2024-04-14 RX ADMIN — OXYCODONE HYDROCHLORIDE 5 MILLIGRAM(S): 5 TABLET ORAL at 18:39

## 2024-04-14 RX ADMIN — OXYCODONE HYDROCHLORIDE 5 MILLIGRAM(S): 5 TABLET ORAL at 02:29

## 2024-04-14 RX ADMIN — CHLORHEXIDINE GLUCONATE 1 APPLICATION(S): 213 SOLUTION TOPICAL at 06:40

## 2024-04-14 RX ADMIN — Medication 1 MILLIGRAM(S): at 10:21

## 2024-04-14 RX ADMIN — PIPERACILLIN AND TAZOBACTAM 25 GRAM(S): 4; .5 INJECTION, POWDER, LYOPHILIZED, FOR SOLUTION INTRAVENOUS at 13:03

## 2024-04-14 RX ADMIN — OXYCODONE HYDROCHLORIDE 5 MILLIGRAM(S): 5 TABLET ORAL at 06:09

## 2024-04-14 RX ADMIN — Medication 3 MILLILITER(S): at 15:06

## 2024-04-14 RX ADMIN — Medication 1 MILLIGRAM(S): at 22:24

## 2024-04-14 RX ADMIN — OXYCODONE HYDROCHLORIDE 5 MILLIGRAM(S): 5 TABLET ORAL at 01:29

## 2024-04-14 RX ADMIN — Medication 3 MILLILITER(S): at 08:59

## 2024-04-14 RX ADMIN — HEPARIN SODIUM 5000 UNIT(S): 5000 INJECTION INTRAVENOUS; SUBCUTANEOUS at 22:07

## 2024-04-14 RX ADMIN — OXYCODONE HYDROCHLORIDE 5 MILLIGRAM(S): 5 TABLET ORAL at 23:15

## 2024-04-14 RX ADMIN — LIDOCAINE 2 PATCH: 4 CREAM TOPICAL at 16:23

## 2024-04-14 RX ADMIN — SENNA PLUS 2 TABLET(S): 8.6 TABLET ORAL at 22:07

## 2024-04-14 RX ADMIN — BUDESONIDE AND FORMOTEROL FUMARATE DIHYDRATE 2 PUFF(S): 160; 4.5 AEROSOL RESPIRATORY (INHALATION) at 22:07

## 2024-04-14 RX ADMIN — PIPERACILLIN AND TAZOBACTAM 25 GRAM(S): 4; .5 INJECTION, POWDER, LYOPHILIZED, FOR SOLUTION INTRAVENOUS at 06:03

## 2024-04-14 RX ADMIN — Medication 112 MICROGRAM(S): at 06:02

## 2024-04-14 RX ADMIN — HEPARIN SODIUM 5000 UNIT(S): 5000 INJECTION INTRAVENOUS; SUBCUTANEOUS at 06:07

## 2024-04-14 RX ADMIN — PIPERACILLIN AND TAZOBACTAM 25 GRAM(S): 4; .5 INJECTION, POWDER, LYOPHILIZED, FOR SOLUTION INTRAVENOUS at 22:07

## 2024-04-14 RX ADMIN — OXYCODONE HYDROCHLORIDE 10 MILLIGRAM(S): 5 TABLET ORAL at 20:05

## 2024-04-14 RX ADMIN — OXYCODONE HYDROCHLORIDE 10 MILLIGRAM(S): 5 TABLET ORAL at 04:37

## 2024-04-14 RX ADMIN — LOSARTAN POTASSIUM 50 MILLIGRAM(S): 100 TABLET, FILM COATED ORAL at 06:02

## 2024-04-14 RX ADMIN — OXYCODONE HYDROCHLORIDE 5 MILLIGRAM(S): 5 TABLET ORAL at 07:58

## 2024-04-14 RX ADMIN — OXYCODONE HYDROCHLORIDE 5 MILLIGRAM(S): 5 TABLET ORAL at 07:02

## 2024-04-14 RX ADMIN — OXYCODONE HYDROCHLORIDE 5 MILLIGRAM(S): 5 TABLET ORAL at 18:07

## 2024-04-14 RX ADMIN — BUDESONIDE AND FORMOTEROL FUMARATE DIHYDRATE 2 PUFF(S): 160; 4.5 AEROSOL RESPIRATORY (INHALATION) at 09:14

## 2024-04-14 RX ADMIN — Medication 40 MILLIEQUIVALENT(S): at 13:04

## 2024-04-14 RX ADMIN — LIDOCAINE 2 PATCH: 4 CREAM TOPICAL at 23:00

## 2024-04-14 RX ADMIN — Medication 3 MILLILITER(S): at 03:38

## 2024-04-14 RX ADMIN — CYCLOBENZAPRINE HYDROCHLORIDE 5 MILLIGRAM(S): 10 TABLET, FILM COATED ORAL at 01:52

## 2024-04-14 NOTE — PROGRESS NOTE ADULT - SUBJECTIVE AND OBJECTIVE BOX
ROXY FAN  MRN-[id number]    Patient has been seen and communicated with on multiple occasions, along with family members, 1 daughter, 1 son, 1 . They have been very invested in her treatment and are clearly documenting all the visitations as they happen.   Patient is very uncomfortable with the idea of surgery right now, citing her discomfort, her need to care for her right foot bunion which has had history of infection, and her current cough and congestion with a diagnosed pneumonia  patient is being followed by multiple specialties which have communicated no contraindications to surgery, but patient herself does not feel comfortable with surgery. she ideally wants to go home, recuperate, and come back to do surgery when she feels ready. she is against the idea of going to [facility], but her family is against her going home. she has difficulty taking deep breaths.   Pt denies Chest pain,  dyspnea, paresthesias, N/V/D, abdominal pain, syncope, or pain anywhere else.     Vital Signs Last 24 Hrs  T(C): 36.8 (04-14-24 @ 05:15), Max: 36.8 (04-13-24 @ 13:11)  T(F): 98.2 (04-14-24 @ 05:15), Max: 98.3 (04-13-24 @ 21:10)  HR: 91 (04-14-24 @ 05:15) (84 - 91)  BP: 169/100 (04-14-24 @ 05:15) (144/92 - 169/100)  BP(mean): --  RR: 18 (04-14-24 @ 05:15) (18 - 19)  SpO2: 93% (04-14-24 @ 05:15) (93% - 94%)        Physical Exam:    General: AAOx3, NAD, resting comfortably in bed.    L Hip: Skin is pink and warm. Tender at the fracture site. Decreased ROM of the affected hip due to pain. SILT.  Positive logroll test.  Lower extremity:  No calf tenderness, calves are soft. 2+pulses. NVI. (+)EHL/FHL/ADF/APF. Noted to have chronic LE skin changes from stasis, Good capillary refill. Warm, well-perfused. SILT.                                              12.6   10.15 )-----------( 233      ( 14 Apr 2024 05:55 )             37.7   04-14    137  |  98  |  10  ----------------------------<  104<H>  3.3<L>   |  34<H>  |  0.45<L>    Ca    8.5      14 Apr 2024 05:55  Phos  2.9     04-14  Mg     2.2     04-14    TPro  6.7  /  Alb  2.9<L>  /  TBili  0.7  /  DBili  x   /  AST  19  /  ALT  20  /  AlkPhos  109  04-14        Impression:  72 y/o F with L Hip Basicervical Fracture    Plan:  -  D/w Dr. Lang- Recommends left hip IM nailing   -  Pain control  -  DVT prophylaxis  -  Medical Optimization  - clearances need to be written by pulmonology, medicine, as these were the reasons cited for not having surgery by the family and the patient. attempts to persuade patient once these clearances align will be made.   otherwise patient is refusing surgery, wants to be discharged and f/u on an outpt basis to do surgery.   -  Consent: Pending  -  NWB to LLE  -  Case d/w Dr. Lang and medicine NP

## 2024-04-14 NOTE — PROGRESS NOTE ADULT - SUBJECTIVE AND OBJECTIVE BOX
PATIENT SEEN AND EXAMINED BY SYD TRIPATHI M.D. ON :- 4/14/24  DATE OF SERVICE:   4/14/24          Interim events noted,Labs ,Radiological studies and Cardiology tests reviewed .    Patient is a 73y old  Female who presents with a chief complaint of L hip fracture (14 Apr 2024 18:59)      HPI:  73-year-old female from home ambulates with a walker at baseline with PMH of HTN, COPD ( not on O2 at home), current smoker with 60 ppy hx, hypothyroidism, spinal stenosis urinary incontinence, presenting with left-sided lower back and hip pain after fall 2 weeks ago. Approximately 1-2 weeks ago the patient fell while walking and her leg "gave out" and since then she has had left-sided leg pain.  Patient reports she is able to walk with her cane/walker but feels very weak and unbalanced and has since fallen 2 more times. Pt also complains of SOB. Patient reporting chest pain since the fall today after her cane hit her chest. Patient denies any headache, dizziness, cough, congestion, fever, chills, N,V,D,Abd pain, numbness or tingling. In the ED, pt is comfortable, but complains of pain, NAD, pt is wheezing b/l with R sided crackles, pt was found to be hypoxic on RA to 80s saturating well on 1-2 L NC, hypertensive 175/105, wbc 9, Na 130. CTH and cervical spine shows No gross acute intracranial hemorrhage. No gross acute fracture cervical spine. CTC shows Loss of height of multiple thoracic and lumbar spine vertebral bodies which are of indeterminate age. Dominant cluster of nodular and patchy opacities within the superior segment of the right lower lobe suggestive of infection with endobronchial spread. Emphysema. Intrahepatic and extrahepatic biliary ductal dilatation is of unclear etiology. CT pelvis shows Acute proximal left femoral fracture,  Age-indeterminate severe compression fracture of the L5 vertebral body. Pt is admitted for L hip fracture/ SOB 2/2 PNA vs COPD exacerbation.    Off note: pt states that she lost about 30 lbs in the last 2 months. She is undergoing colonoscopy o/p to evaluate for weight loss. pt denies any blood in the stool. However, she has family history of colon ca in her father.  (09 Apr 2024 19:22)      PAST MEDICAL & SURGICAL HISTORY:  HTN (hypertension)      Hypothyroidism      COPD (chronic obstructive pulmonary disease)      Spinal stenosis      Current smoker      H/O foot surgery          PREVIOUS DIAGNOSTIC TESTING:      ECHO  FINDINGS:    STRESS  FINDINGS:    CATHETERIZATION  FINDINGS:    MEDICATIONS  (STANDING):  albuterol/ipratropium for Nebulization 3 milliLiter(s) Nebulizer every 6 hours  budesonide 160 MICROgram(s)/formoterol 4.5 MICROgram(s) Inhaler 2 Puff(s) Inhalation two times a day  chlorhexidine 2% Cloths 1 Application(s) Topical <User Schedule>  gabapentin 100 milliGRAM(s) Oral every 8 hours  heparin   Injectable 5000 Unit(s) SubCutaneous every 8 hours  levothyroxine 112 MICROGram(s) Oral daily  lidocaine   4% Patch 2 Patch Transdermal daily  losartan 50 milliGRAM(s) Oral daily  naloxone Injectable 0.4 milliGRAM(s) IV Push once  nicotine -   7 mG/24Hr(s) Patch 1 Patch Transdermal daily  piperacillin/tazobactam IVPB.. 3.375 Gram(s) IV Intermittent every 8 hours  polyethylene glycol 3350 17 Gram(s) Oral daily  senna 2 Tablet(s) Oral at bedtime    MEDICATIONS  (PRN):  acetaminophen     Tablet .. 1000 milliGRAM(s) Oral every 8 hours PRN Mild Pain (1 - 3)  albuterol    90 MICROgram(s) HFA Inhaler 2 Puff(s) Inhalation every 6 hours PRN Bronchospasm  ALPRAZolam 1 milliGRAM(s) Oral every 12 hours PRN Anxiety  benzonatate 100 milliGRAM(s) Oral every 8 hours PRN Cough  bisacodyl 5 milliGRAM(s) Oral daily PRN Constipation  cyclobenzaprine 5 milliGRAM(s) Oral every 8 hours PRN Spasm  oxyCODONE    IR 10 milliGRAM(s) Oral every 4 hours PRN Severe Pain (7 - 10)  oxyCODONE    IR 5 milliGRAM(s) Oral every 4 hours PRN Moderate Pain (4 - 6)      FAMILY HISTORY:  FH: colon cancer (Father)        SOCIAL HISTORY:    CIGARETTES:    ALCOHOL:    REVIEW OF SYSTEMS:  CONSTITUTIONAL: No fever, weight loss, or fatigue  EYES: No eye pain, visual disturbances, or discharge  ENMT:  No difficulty hearing, tinnitus, vertigo; No sinus or throat pain  NECK: No pain or stiffness  RESPIRATORY: No cough, wheezing, chills or hemoptysis; No shortness of breath  CARDIOVASCULAR: No chest pain, palpitations, dizziness, or leg swelling  GASTROINTESTINAL: No abdominal or epigastric pain. No nausea, vomiting, or hematemesis; No diarrhea or constipation. No melena or hematochezia.  GENITOURINARY: No dysuria, frequency, hematuria, or incontinence  NEUROLOGICAL: No headaches, memory loss, loss of strength, numbness, or tremors  SKIN: No itching, burning, rashes, or lesions   LYMPH NODES: No enlarged glands  ENDOCRINE: No heat or cold intolerance; No hair loss  MUSCULOSKELETAL: No joint pain or swelling; No muscle, back, or extremity pain  PSYCHIATRIC: No depression, anxiety, mood swings, or difficulty sleeping  HEME/LYMPH: No easy bruising, or bleeding gums  ALLERY AND IMMUNOLOGIC: No hives or eczema    Vital Signs Last 24 Hrs  T(C): 37.3 (14 Apr 2024 20:51), Max: 37.3 (14 Apr 2024 20:51)  T(F): 99.1 (14 Apr 2024 20:51), Max: 99.1 (14 Apr 2024 20:51)  HR: 93 (14 Apr 2024 20:51) (87 - 97)  BP: 146/84 (14 Apr 2024 20:51) (137/73 - 169/100)  BP(mean): --  RR: 18 (14 Apr 2024 20:51) (18 - 18)  SpO2: 95% (14 Apr 2024 20:51) (91% - 95%)    Parameters below as of 14 Apr 2024 20:51  Patient On (Oxygen Delivery Method): nasal cannula  O2 Flow (L/min): 2        PHYSICAL EXAM:  GENERAL: NAD, well-groomed, well-developed  HEAD:  Atraumatic, Normocephalic  EYES: EOMI, PERRLA, conjunctiva and sclera clear  ENMT: No tonsillar erythema, exudates, or enlargement; Moist mucous membranes, Good dentition, No lesions  NECK: Supple, No JVD, Normal thyroid  NERVOUS SYSTEM:  Alert & Oriented X3, Good concentration; Motor Strength 5/5 B/L upper and lower extremities; DTRs 2+ intact and symmetric  CHEST/LUNG: Clear to percussion bilaterally; No rales, rhonchi, wheezing, or rubs  HEART: Regular rate and rhythm; No murmurs, rubs, or gallops  ABDOMEN: Soft, Nontender, Nondistended; Bowel sounds present  EXTREMITIES:  2+ Peripheral Pulses, No clubbing, cyanosis, or edema  LYMPH: No lymphadenopathy noted  SKIN: No rashes or lesions      INTERPRETATION OF TELEMETRY:    ECG:    CHADSVASC:     LABS:                        12.6   10.15 )-----------( 233      ( 14 Apr 2024 05:55 )             37.7     04-14    137  |  98  |  10  ----------------------------<  104<H>  3.3<L>   |  34<H>  |  0.45<L>    Ca    8.5      14 Apr 2024 05:55  Phos  2.9     04-14  Mg     2.2     04-14    TPro  6.7  /  Alb  2.9<L>  /  TBili  0.7  /  DBili  x   /  AST  19  /  ALT  20  /  AlkPhos  109  04-14          Urinalysis Basic - ( 14 Apr 2024 05:55 )    Color: x / Appearance: x / SG: x / pH: x  Gluc: 104 mg/dL / Ketone: x  / Bili: x / Urobili: x   Blood: x / Protein: x / Nitrite: x   Leuk Esterase: x / RBC: x / WBC x   Sq Epi: x / Non Sq Epi: x / Bacteria: x      Lipid Panel:   I&O's Summary    13 Apr 2024 07:01  -  14 Apr 2024 07:00  --------------------------------------------------------  IN: 0 mL / OUT: 1800 mL / NET: -1800 mL    14 Apr 2024 07:01  -  14 Apr 2024 21:47  --------------------------------------------------------  IN: 0 mL / OUT: 1200 mL / NET: -1200 mL        RADIOLOGY & ADDITIONAL STUDIES:

## 2024-04-14 NOTE — PROGRESS NOTE ADULT - SUBJECTIVE AND OBJECTIVE BOX
Time of Visit:  Patient seen and examined.     MEDICATIONS  (STANDING):  albuterol/ipratropium for Nebulization 3 milliLiter(s) Nebulizer every 6 hours  budesonide 160 MICROgram(s)/formoterol 4.5 MICROgram(s) Inhaler 2 Puff(s) Inhalation two times a day  chlorhexidine 2% Cloths 1 Application(s) Topical <User Schedule>  gabapentin 100 milliGRAM(s) Oral every 8 hours  heparin   Injectable 5000 Unit(s) SubCutaneous every 8 hours  levothyroxine 112 MICROGram(s) Oral daily  lidocaine   4% Patch 2 Patch Transdermal daily  losartan 50 milliGRAM(s) Oral daily  naloxone Injectable 0.4 milliGRAM(s) IV Push once  nicotine -   7 mG/24Hr(s) Patch 1 Patch Transdermal daily  piperacillin/tazobactam IVPB.. 3.375 Gram(s) IV Intermittent every 8 hours  polyethylene glycol 3350 17 Gram(s) Oral daily  senna 2 Tablet(s) Oral at bedtime      MEDICATIONS  (PRN):  acetaminophen     Tablet .. 1000 milliGRAM(s) Oral every 8 hours PRN Mild Pain (1 - 3)  albuterol    90 MICROgram(s) HFA Inhaler 2 Puff(s) Inhalation every 6 hours PRN Bronchospasm  ALPRAZolam 1 milliGRAM(s) Oral every 12 hours PRN Anxiety  benzonatate 100 milliGRAM(s) Oral every 8 hours PRN Cough  bisacodyl 5 milliGRAM(s) Oral daily PRN Constipation  cyclobenzaprine 5 milliGRAM(s) Oral every 8 hours PRN Spasm  oxyCODONE    IR 5 milliGRAM(s) Oral every 4 hours PRN Moderate Pain (4 - 6)  oxyCODONE    IR 10 milliGRAM(s) Oral every 4 hours PRN Severe Pain (7 - 10)       Medications up to date at time of exam.      PHYSICAL EXAMINATION:  Patient has no new complaints.  GENERAL: The patient  in no apparent distress.     Vital Signs Last 24 Hrs  T(C): 36.6 (14 Apr 2024 13:11), Max: 36.8 (13 Apr 2024 21:10)  T(F): 97.9 (14 Apr 2024 13:11), Max: 98.3 (13 Apr 2024 21:10)  HR: 97 (14 Apr 2024 17:08) (87 - 97)  BP: 137/73 (14 Apr 2024 17:08) (137/73 - 169/100)  BP(mean): --  RR: 18 (14 Apr 2024 17:08) (18 - 18)  SpO2: 91% (14 Apr 2024 17:08) (91% - 94%)    Parameters below as of 14 Apr 2024 17:08  Patient On (Oxygen Delivery Method): room air       (if applicable)    Chest Tube (if applicable)    HEENT: Head is normocephalic and atraumatic. Extraocular muscles are intact. Mucous membranes are moist.     NECK: Supple, no palpable adenopathy.    LUNGS: Fair bilateral berath sounds  no wheezing, rales, or rhonchi.    HEART: Regular rate and rhythm without murmur.    ABDOMEN: Soft, nontender, and nondistended.  No hepatosplenomegaly is noted.    : No painful voiding, no pelvic pain    EXTREMITIES: Without any cyanosis, clubbing, rash, lesions or edema.    NEUROLOGIC: Awake, alert, oriented, grossly intact    SKIN: Warm, dry, good turgor.      LABS:                        12.6   10.15 )-----------( 233      ( 14 Apr 2024 05:55 )             37.7     04-14    137  |  98  |  10  ----------------------------<  104<H>  3.3<L>   |  34<H>  |  0.45<L>    Ca    8.5      14 Apr 2024 05:55  Phos  2.9     04-14  Mg     2.2     04-14    TPro  6.7  /  Alb  2.9<L>  /  TBili  0.7  /  DBili  x   /  AST  19  /  ALT  20  /  AlkPhos  109  04-14      Urinalysis Basic - ( 14 Apr 2024 05:55 )    Color: x / Appearance: x / SG: x / pH: x  Gluc: 104 mg/dL / Ketone: x  / Bili: x / Urobili: x   Blood: x / Protein: x / Nitrite: x   Leuk Esterase: x / RBC: x / WBC x   Sq Epi: x / Non Sq Epi: x / Bacteria: x                  Procalcitonin, Serum: 0.08 ng/mL (04-13-24 @ 06:15)      MICROBIOLOGY: (if applicable)    RADIOLOGY & ADDITIONAL STUDIES:  EKG:   CXR:  ECHO:    IMPRESSION: 73y Female PAST MEDICAL & SURGICAL HISTORY:  HTN (hypertension)      Hypothyroidism      COPD (chronic obstructive pulmonary disease)      Spinal stenosis      Current smoker      H/O foot surgery       p/w            Time of Visit:  Patient seen and examined.     MEDICATIONS  (STANDING):  albuterol/ipratropium for Nebulization 3 milliLiter(s) Nebulizer every 6 hours  budesonide 160 MICROgram(s)/formoterol 4.5 MICROgram(s) Inhaler 2 Puff(s) Inhalation two times a day  chlorhexidine 2% Cloths 1 Application(s) Topical <User Schedule>  gabapentin 100 milliGRAM(s) Oral every 8 hours  heparin   Injectable 5000 Unit(s) SubCutaneous every 8 hours  levothyroxine 112 MICROGram(s) Oral daily  lidocaine   4% Patch 2 Patch Transdermal daily  losartan 50 milliGRAM(s) Oral daily  naloxone Injectable 0.4 milliGRAM(s) IV Push once  nicotine -   7 mG/24Hr(s) Patch 1 Patch Transdermal daily  piperacillin/tazobactam IVPB.. 3.375 Gram(s) IV Intermittent every 8 hours  polyethylene glycol 3350 17 Gram(s) Oral daily  senna 2 Tablet(s) Oral at bedtime      MEDICATIONS  (PRN):  acetaminophen     Tablet .. 1000 milliGRAM(s) Oral every 8 hours PRN Mild Pain (1 - 3)  albuterol    90 MICROgram(s) HFA Inhaler 2 Puff(s) Inhalation every 6 hours PRN Bronchospasm  ALPRAZolam 1 milliGRAM(s) Oral every 12 hours PRN Anxiety  benzonatate 100 milliGRAM(s) Oral every 8 hours PRN Cough  bisacodyl 5 milliGRAM(s) Oral daily PRN Constipation  cyclobenzaprine 5 milliGRAM(s) Oral every 8 hours PRN Spasm  oxyCODONE    IR 5 milliGRAM(s) Oral every 4 hours PRN Moderate Pain (4 - 6)  oxyCODONE    IR 10 milliGRAM(s) Oral every 4 hours PRN Severe Pain (7 - 10)       Medications up to date at time of exam.      PHYSICAL EXAMINATION:  Patient has no new complaints.  GENERAL: The patient  in no apparent distress.     Vital Signs Last 24 Hrs  T(C): 36.6 (14 Apr 2024 13:11), Max: 36.8 (13 Apr 2024 21:10)  T(F): 97.9 (14 Apr 2024 13:11), Max: 98.3 (13 Apr 2024 21:10)  HR: 97 (14 Apr 2024 17:08) (87 - 97)  BP: 137/73 (14 Apr 2024 17:08) (137/73 - 169/100)  BP(mean): --  RR: 18 (14 Apr 2024 17:08) (18 - 18)  SpO2: 91% (14 Apr 2024 17:08) (91% - 94%)    Parameters below as of 14 Apr 2024 17:08  Patient On (Oxygen Delivery Method): room air       (if applicable)    Chest Tube (if applicable)    HEENT: Head is normocephalic and atraumatic. Extraocular muscles are intact. Mucous membranes are moist.     NECK: Supple, no palpable adenopathy.    LUNGS: Fair bilateral berath sounds  no wheezing, rales, or rhonchi.    HEART: Regular rate and rhythm without murmur.    ABDOMEN: Soft, nontender, and nondistended.  No hepatosplenomegaly is noted.    : No painful voiding, no pelvic pain    EXTREMITIES: Without any cyanosis, clubbing, rash, lesions or edema.    NEUROLOGIC: Awake, alert, oriented, grossly intact    SKIN: Warm, dry, good turgor.      LABS:                        12.6   10.15 )-----------( 233      ( 14 Apr 2024 05:55 )             37.7     04-14    137  |  98  |  10  ----------------------------<  104<H>  3.3<L>   |  34<H>  |  0.45<L>    Ca    8.5      14 Apr 2024 05:55  Phos  2.9     04-14  Mg     2.2     04-14    TPro  6.7  /  Alb  2.9<L>  /  TBili  0.7  /  DBili  x   /  AST  19  /  ALT  20  /  AlkPhos  109  04-14      Urinalysis Basic - ( 14 Apr 2024 05:55 )    Color: x / Appearance: x / SG: x / pH: x  Gluc: 104 mg/dL / Ketone: x  / Bili: x / Urobili: x   Blood: x / Protein: x / Nitrite: x   Leuk Esterase: x / RBC: x / WBC x   Sq Epi: x / Non Sq Epi: x / Bacteria: x                  Procalcitonin, Serum: 0.08 ng/mL (04-13-24 @ 06:15)      MICROBIOLOGY: (if applicable)    RADIOLOGY & ADDITIONAL STUDIES:  EKG:   CXR:  ECHO:    IMPRESSION: 73y Female PAST MEDICAL & SURGICAL HISTORY:  HTN (hypertension)      Hypothyroidism      COPD (chronic obstructive pulmonary disease)      Spinal stenosis      Current smoker      H/O foot surgery       p/w       Impression: This is a 74 Y/O Female with s/p multiple Falls at Home and sustained Left Hip fracture . Presented with complains of episode of SOB , hypoxia in ED with o2 saturation 80s room air due to Acute hypoxic respiratory failure secondary to combination of Right lung Pneumonia showing on CT chest   and COPD exacerbation , Active smoker . Negative swab for RVP, Covid. Negative UA for legionella .     Suggestion:  O2 saturation 93% room air at rest . Can have Oxygen supplementation 2L NC .  Continue Duoneb via nebulization Q 6 hours . Do not give at the same time with PRN Albuterol 90 mcg 2 puffs Q 6 Hours.   Continue Symbicort 160- 4.5 mcg 2 puffs Twice daily. using Wixela Twice daily outpatient .   On Zosyn 3.375 Gm IVPB Q 8 Hours .  Continue Prednisone 40 mg Oral daily x 5 days then taper to 20 mg Oral Daily x 5 days.   Orthopedic following .   Reinforced the importance of smoking cessation  ( is a smoker too )   , On nicotine patch on x 12 hours.

## 2024-04-14 NOTE — PROGRESS NOTE ADULT - SUBJECTIVE AND OBJECTIVE BOX
Patient is a 73y old  Female who presents with a chief complaint of L hip fracture , pt refusing hip surgery stating that she is not ready   4/14/24    MEDICATIONS  (STANDING):  albuterol/ipratropium for Nebulization 3 milliLiter(s) Nebulizer every 6 hours  budesonide 160 MICROgram(s)/formoterol 4.5 MICROgram(s) Inhaler 2 Puff(s) Inhalation two times a day  chlorhexidine 2% Cloths 1 Application(s) Topical <User Schedule>  gabapentin 100 milliGRAM(s) Oral every 8 hours  heparin   Injectable 5000 Unit(s) SubCutaneous every 8 hours  levothyroxine 112 MICROGram(s) Oral daily  lidocaine   4% Patch 2 Patch Transdermal daily  losartan 50 milliGRAM(s) Oral daily  naloxone Injectable 0.4 milliGRAM(s) IV Push once  nicotine -   7 mG/24Hr(s) Patch 1 Patch Transdermal daily  piperacillin/tazobactam IVPB.. 3.375 Gram(s) IV Intermittent every 8 hours  polyethylene glycol 3350 17 Gram(s) Oral daily  senna 2 Tablet(s) Oral at bedtime    MEDICATIONS  (PRN):  acetaminophen     Tablet .. 1000 milliGRAM(s) Oral every 8 hours PRN Mild Pain (1 - 3)  albuterol    90 MICROgram(s) HFA Inhaler 2 Puff(s) Inhalation every 6 hours PRN Bronchospasm  ALPRAZolam 1 milliGRAM(s) Oral every 12 hours PRN Anxiety  benzonatate 100 milliGRAM(s) Oral every 8 hours PRN Cough  bisacodyl 5 milliGRAM(s) Oral daily PRN Constipation  cyclobenzaprine 5 milliGRAM(s) Oral every 8 hours PRN Spasm  oxyCODONE    IR 5 milliGRAM(s) Oral every 4 hours PRN Moderate Pain (4 - 6)  oxyCODONE    IR 10 milliGRAM(s) Oral every 4 hours PRN Severe Pain (7 - 10)    __________________________________________________  REVIEW OF SYSTEMS:  " not too good"  CONSTITUTIONAL: No fever,   EYES: no acute visual disturbances  NECK: No pain or stiffness  RESPIRATORY: No cough; No shortness of breath  CARDIOVASCULAR: No chest pain, no palpitations  GASTROINTESTINAL: No pain. No nausea or vomiting; No diarrhea   NEUROLOGICAL: No headache or numbness, no tremors  MUSCULOSKELETAL: No joint pain, no muscle pain  GENITOURINARY: no dysuria, no frequency, no hesitancy  ALL OTHER  ROS negative      Vital Signs Last 24 Hrs  T(C): 37.3 (04-14-24 @ 20:51), Max: 37.3 (04-14-24 @ 20:51)  T(F): 99.1 (04-14-24 @ 20:51), Max: 99.1 (04-14-24 @ 20:51)  HR: 93 (04-14-24 @ 20:51) (87 - 97)  BP: 146/84 (04-14-24 @ 20:51) (137/73 - 169/100)  BP(mean): --  RR: 18 (04-14-24 @ 20:51) (18 - 18)  SpO2: 95% (04-14-24 @ 20:51) (91% - 95%)          ________________________________________________  PHYSICAL EXAM:  GENERAL: NAD, frail  HEENT: Normocephalic; conjunctivae and sclerae clear; moist mucous membranes;   NECK : supple  CHEST/LUNG: Diminished , rhonchi + ant  HEART: S1 S2  regular;  ABDOMEN: Soft, Nontender, Nondistended; Bowel sounds present  EXTREMITIES: no cyanosis; no edema; no calf tenderness  SKIN: warm and dry; no rash  NERVOUS SYSTEM:  Awake and alert; Oriented to place, person and time    _________________________________________________  LABS:  04-14    137  |  98  |  10  ----------------------------<  104<H>  3.3<L>   |  34<H>  |  0.45<L>    Ca    8.5      14 Apr 2024 05:55  Phos  2.9     04-14  Mg     2.2     04-14    TPro  6.7  /  Alb  2.9<L>  /  TBili  0.7  /  DBili      /  AST  19  /  ALT  20  /  AlkPhos  109  04-14    Creatinine Trend: 0.45 <--, 0.42 <--, 0.40 <--, 0.46 <--, 0.40 <--, 0.43 <--, 0.47 <--                        12.6   10.15 )-----------( 233      ( 14 Apr 2024 05:55 )             37.7     Urine Studies:  Urinalysis Basic - ( 14 Apr 2024 05:55 )    Color:  / Appearance:  / SG:  / pH:   Gluc: 104 mg/dL / Ketone:   / Bili:  / Urobili:    Blood:  / Protein:  / Nitrite:    Leuk Esterase:  / RBC:  / WBC    Sq Epi:  / Non Sq Epi:  / Bacteria:       Sodium, Random Urine: 28 mmol/L (04-11 @ 19:45)  Osmolality, Random Urine: 161 mos/kg (04-11 @ 19:45)  Sodium, Random Urine: 42 mmol/L (04-11 @ 16:35)  Creatinine, Random Urine: 53 mg/dL (04-11 @ 16:35)  Protein/Creatinine Ratio Calculation: 0.6 Ratio (04-11 @ 16:35)  Potassium, Random Urine: 39 mmol/L (04-11 @ 16:35)  Osmolality, Random Urine: 279 mos/kg (04-11 @ 06:30)          LIVER FUNCTIONS - ( 14 Apr 2024 05:55 )  Alb: 2.9 g/dL / Pro: 6.7 g/dL / ALK PHOS: 109 U/L / ALT: 20 U/L DA / AST: 19 U/L / GGT: x                       RADIOLOGY & ADDITIONAL TESTS:  < from: MR Thoracic Spine No Cont (04.11.24 @ 19:15) >    ACC: [id number] EXAM:  MR BRAIN   ORDERED BY: TAMMI CHRISTIE     ACC: [id number] EXAM:  MR SPINE THORACIC   ORDERED BY: TAMMI CHRISTIE     ACC: [id number] EXAM:  MR SPINE CERVICAL   ORDERED BY: TAMMI CHRISTIE     PROCEDURE DATE:  04/11/2024          INTERPRETATION:  Three examinations were performed in this patient:  1.  MR of the brain without gadolinium contrast  2.  MR cervical spine without gadolinium contrast  3.  MR thoracic spine without gadolinium contrast    CLINICAL INFORMATION: Post fall trauma cord compression  HMR  Admitting   Dxs: S72.009A FRACTURE OF UNSP PART OF NECK OF UNSP FEMUR, INIT    TECHNIQUE:  1. MR brain:  Sagittal and axial T1-weighted images, axial FLAIR images,   axial susceptibility weighted images, axial T2-weighted images and axial   diffusion weighted images of the brain were obtained.  2.  MR cervical spine:   Sagittal T2-weighted images were obtained.  3.  MR thoracic spine:   Sagittal T2-weighted images were obtained.  The remainder these examinations could not be completed, not tolerated by   the patient. The images that could be obtained are degraded by patient   motion.  CONTRAST:    None    COMPARISON:   CT head 4/10/2023 and CT cervical and CT chest 4/9/2023    FINDINGS:    BRAIN    BRAIN:   The brain demonstrates small focal lesions of remote deep   infarction. The most conspicuous are noted in the external capsules. No   cerebral cortical lesion is recognized.   No diffusion restriction is   found in the brain.  No acute cerebral cortical infarct is found.   No   intracranial hemorrhage is recognized.  No mass effect is found in the   brain.   The brain also demonstrates patchy indistinct lesions within the   deep cerebral hemispheric white matter typical for ischemic white matter   disease.    CSF SPACES:   The ventricles, sulci and basal cisterns appear moderately   dilated reflecting diffuse brain volume loss.    VESSELS:   The vertebral and internal carotid arteries demonstrate   expected flow voids indicating their patency.    HEAD AND NECK STRUCTURES:   The orbits are unremarkable.  Paranasal   sinuses are clear.  The nasal cavity demonstrates left-to-right septal   deviation. Left middle turbinate tristen bullosa is present..  The central   skull base appears intact.  The nasopharynx is symmetric.  The temporal   bones appear clear of disease.  The calvarium appears unremarkable.    CERVICAL SPINE    Cervical vertebral alignment is significant for grade 1 anterior   listhesis C4 on C5, also slight retrolisthesis C5 on C6.   Facet joints   appear aligned.   Cervical vertebral body heights are maintained.   Cervical vertebral marrow signal intensity mildly heterogeneous from   degenerative endplate changes. These include small marginal osteophytes   most prominently at C5-C6.  No osseous expansion or epidural disease is   found.  No destructive bone lesion is found.   There are osteoarthritic   changes at the articulation of the anterior ring of C1 and the odontoid   process of C2.  This arthropathy includes marginal osteophytes,   subchondral sclerosis and joint space narrowing.    Cervical intervertebral disc spaces demonstrate a generalized pattern of   degeneration characterized by signal intensity loss on the long TR   images. Degenerative disc heightloss appears greatest at C5-C6.   Evaluation of degenerative disc disease is limited in the absence of   axial images.   However, no degenerative high-grade central canal   compromise is recognized.  Neural foramina appear narrowed by disc   materialand uncovertebral joint osteophytes most prominently at the   lower cervical intervertebral disc levels..    Cervical cord morphology Is preserved.  The cervical cord maintains   intact signal intensity   No focal intrinsic cord lesion is identified.   No cord expansion or cord volume loss is recognized.    The visualized adjacent neck structures appear intact.  No neck mass is   recognized.  Paraspinal soft tissues appear intact.  Visualized lymph   nodes appear to be within physiologic size limits.    THORACIC SPINE    Thoracic vertebral alignment demonstrates exaggeration of the mid   thoracic kyphosis. This vertebral malalignment has an apex at the T7   inferior endplate compression fracture. Approximately 70% height loss   results at this level. More limited compression fractures involve the T6   inferior endplate and the T9 superior endplate. Schmorl's nodes result.   More limited superior endplate deformity of T5 and T11. The remaining   thoracic vertebral body heights are maintained. Superior endplate   deformities are also present at L1-L2 and L4. At the L2 vertebral body   burst fracture pattern is present with impression upon the ventral thecal   sac but does not appear to compromise the cauda equina. At the thoracic   vertebral fracture is no high-grade central canal compromise is   recognized. Thoracic vertebral marrow signal intensity relatively   homogeneous without recognition of significant marrow edema.  No   destructive bone lesion, osseous expansion or epidural disease is found.    The paraspinal soft tissues appear intact.    Thoracic intervertebral disc spaces demonstrate a generalized pattern of   at least mild degeneration with signal intensity loss on the long TR   images. Disc heights are largely maintained.   No high-grade degenerative   central canal compromise is recognized. Thoracic neural foramina appear   intact.    The thoracic cord morphology is preserved.  The thoracic cord maintains   intact signal intensity.      IMPRESSION:    1. BRAIN:   Basal ganglia lacunar infarctions. Ischemic white matter   disease and atrophy typical for age    2. CERVICAL SPINE:   Grade 1 anterolisthesis C4 on C5. Low-grade cervical   degenerative disc disease. Cervical cord appears intact.    3. THORACIC SPINE:   Exaggeration in mid thoracic kyphosis with multiple   thoracic endplate deformities that are age indeterminate but felt likely   to be long-standing. Thoracic cord appears intact.    4. Only limited incomplete examinations could be performed on this date.   Recommend completion scans once tolerated by the patient.    --- End of Report ---            GRAZYNA CRAWLEY MD; Attending Radiologist  This document has been electronically signed. Apr 11 2024  8:02PM    < end of copied text >    Imaging Personally Reviewed:  YES/NO    Consultant(s) Notes Reviewed:   YES/ No    Care Discussed with Consultants :     Plan of care was discussed with patient and /or primary care giver; all questions and concerns were addressed and care was aligned with patient's wishes.

## 2024-04-14 NOTE — PROGRESS NOTE ADULT - SUBJECTIVE AND OBJECTIVE BOX
Patient is seen and examined at the bed side, is afebrile. She is c/o shortness of breath, [facility] at the bed side.      REVIEW OF SYSTEMS: All other review systems are negative      ALLERGIES: No Known Allergies      Vital Signs Last 24 Hrs  T(C): 36.6 (14 Apr 2024 13:11), Max: 36.8 (13 Apr 2024 21:10)  T(F): 97.9 (14 Apr 2024 13:11), Max: 98.3 (13 Apr 2024 21:10)  HR: 97 (14 Apr 2024 17:08) (87 - 97)  BP: 137/73 (14 Apr 2024 17:08) (137/73 - 169/100)  BP(mean): --  RR: 18 (14 Apr 2024 17:08) (18 - 18)  SpO2: 91% (14 Apr 2024 17:08) (91% - 94%)    Parameters below as of 14 Apr 2024 17:08  Patient On (Oxygen Delivery Method): room air      PHYSICAL EXAM:  GENERAL: Not in distress, oFF oxygen   CHEST/LUNG: Not using accessory muscles   HEART: s1 and s2 present  ABDOMEN:  Nontender and  Nondistended  EXTREMITIES: No pedal  edema  CNS: Awake and Alert      LABS:                        12.6   10.15 )-----------( 233      ( 14 Apr 2024 05:55 )             37.7                           13.4   12.28 )-----------( 227      ( 13 Apr 2024 06:15 )             39.8                  04-14    137  |  98  |  10  ----------------------------<  104<H>  3.3<L>   |  34<H>  |  0.45<L>    Ca    8.5      14 Apr 2024 05:55  Phos  2.9     04-14  Mg     2.2     04-14    TPro  6.7  /  Alb  2.9<L>  /  TBili  0.7  /  DBili  x   /  AST  19  /  ALT  20  /  AlkPhos  109  04-14    04-13    136  |  99  |  9   ----------------------------<  143<H>  3.2<L>   |  30  |  0.42<L>    Ca    8.8      13 Apr 2024 06:15  Phos  2.4     04-13  Mg     2.3     04-13    TPro  7.1  /  Alb  3.0<L>  /  TBili  0.7  /  DBili  x   /  AST  24  /  ALT  20  /  AlkPhos  108  04-13          PT/INR - ( 10 Apr 2024 05:10 )   PT: 10.9 sec;   INR: 0.95 ratio       PTT - ( 10 Apr 2024 05:10 )  PTT:29.3 sec      MEDICATIONS  (STANDING):    albuterol/ipratropium for Nebulization 3 milliLiter(s) Nebulizer every 6 hours  budesonide 160 MICROgram(s)/formoterol 4.5 MICROgram(s) Inhaler 2 Puff(s) Inhalation two times a day  chlorhexidine 2% Cloths 1 Application(s) Topical <User Schedule>  gabapentin 100 milliGRAM(s) Oral every 8 hours  heparin   Injectable 5000 Unit(s) SubCutaneous every 8 hours  levothyroxine 112 MICROGram(s) Oral daily  lidocaine   4% Patch 2 Patch Transdermal daily  losartan 50 milliGRAM(s) Oral daily  naloxone Injectable 0.4 milliGRAM(s) IV Push once  nicotine -   7 mG/24Hr(s) Patch 1 Patch Transdermal daily  piperacillin/tazobactam IVPB.. 3.375 Gram(s) IV Intermittent every 8 hours  polyethylene glycol 3350 17 Gram(s) Oral daily  senna 2 Tablet(s) Oral at bedtime      RADIOLOGY & ADDITIONAL TESTS:      4/9/24 : CT Chest No Cont (04.09.24 @ 17:06) Axial CT images of the chest are obtained without intravenous administration of contrast.      IMPRESSION: Loss of height of multiple thoracic and lumbar spine vertebral bodies which are of indeterminate age. Clinical correlation for   back pain is recommended. Dedicated spinal MRI can be performed for complete evaluation.    Dominant cluster of nodular and patchy opacities within the superior segment of the right lower lobe suggestive of infection with   endobronchial spread. A 1-3 month follow-up noncontrast chest CT is recommended to ensure improvement/resolution.    Emphysema.    Intrahepatic and extrahepatic biliary ductal dilatation is of unclear etiology. Upper abdominal ultrasound can be performedfor further   evaluation as clinically warranted.      4/10/24: CT Head No Cont (04.10.24 @ 15:55) IMPRESSION: Age-appropriate involutional and ischemic gliotic changes. No   hemorrhage. No change since 4/9/2024.      4/9/24 : Xray Femur 2 Views, Left (04.09.24 @ 20:35) 1. Diffuse bony demineralization associated with a fracture along the   intertrochanteric line of the proximal left femur, with only slight upward migration of the distal segment, but less than one half bone width.  2. Degenerative arthropathy of the left knee.    4/9/24: Xray Pelvis AP only (04.09.24 @ 20:35) 1. Diffuse bony demineralization with a fracture along the   intertrochanteric line of the proximal left femur. No dislocation of the hips.        MICROBIOLOGY DATA:    MRSA/MSSA PCR (04.11.24 @ 07:02)   MRSA PCR Result.: NotDetec:    Legionella pneumophila Antigen, Urine (04.10.24 @ 05:40)   Legionella Antigen, Urine: Negative:      Respiratory Viral Panel with COVID-19 by SHAHANA (04.09.24 @ 21:24)   Rapid RVP Result: NotDetec  SARS-CoV-2: NotDetec:

## 2024-04-14 NOTE — PROGRESS NOTE ADULT - PROBLEM SELECTOR PLAN 1
- s/p fall x 3 since easter   - CT pelvis shows Acute proximal left femoral fracture  - ortho following -- recommending left hip IM nailing- pt declined surgery  - as per neuro -- MR did not show anything that precludes an orthopedic procedure, ortho made aware

## 2024-04-14 NOTE — PROGRESS NOTE ADULT - ASSESSMENT
Patient is a 73y old  Female who is from home ambulates with a walker at baseline with PMH of HTN, COPD ( not on O2 at home), current smoker with 60 ppy hx, hypothyroidism, spinal stenosis urinary incontinence, now presents to the ER for evaluation of left-sided lower back and hip pain after fall 2 weeks ago. Approximately 1-2 weeks ago the patient fell while walking and her leg "gave out" and since then she has had left-sided leg pain.  Patient reports she is able to walk with her cane/walker but feels very weak and unbalanced and has since fallen 2 more times. Pt also complains of SOB and chest pain since the fall today after her cane hit her chest.  On admission, she has no fever but found to be hypoxic on RA to 80s saturating well on 1-2 L NC. The CT chest shows Dominant cluster of nodular and patchy opacities within the superior segment of the right lower lobe suggestive of infection with endobronchial spread. Emphysema. Intrahepatic and extrahepatic biliary ductal dilatation is of unclear etiology. CT pelvis shows Acute proximal left femoral fracture,  Age-indeterminate severe compression fracture of the L5 vertebral body. Pt is admitted for L hip fracture/ SOB 2/2 PNA vs COPD exacerbation. She has started on Zosyn and the ID consult requested to assist with further evaluation and antibiotic management.    # Pneumonia - Legionella urine Ag and MRSA PCR is negative   # Left femoral fracture, s/p Fall- Surgery on 4/11 as per Ortho, but was not done since patient is not optimized yet    would recommend :    1. Monitor oxygen saturation and continue Supplemental oxygenation, Bronchodilator as needed  2. Aspiration precaution  3. Continue Zosyn until 4/17/24, may change to oral  Augmentin 875mg q 12hours on discharge if discharge before 4/17- patient still not  sure if she wants Surgery or not  4. Pain management as needed    d/w Covering NP, Gurvinder, patient and family at the bed side     Attending Attestation:    Spent more than 35 minutes on total encounter, more than 50 % of the visit was spent counseling and/or coordinating care by the Attending physician.

## 2024-04-15 ENCOUNTER — RESULT REVIEW (OUTPATIENT)
Age: 74
End: 2024-04-15

## 2024-04-15 DIAGNOSIS — Z02.9 ENCOUNTER FOR ADMINISTRATIVE EXAMINATIONS, UNSPECIFIED: ICD-10-CM

## 2024-04-15 LAB
ACRM BINDING ANTIBODY: 1.58 NMOL/L — HIGH (ref 0–0.24)
ALBUMIN SERPL ELPH-MCNC: 2.7 G/DL — LOW (ref 3.5–5)
ALP SERPL-CCNC: 113 U/L — SIGNIFICANT CHANGE UP (ref 40–120)
ALT FLD-CCNC: 19 U/L DA — SIGNIFICANT CHANGE UP (ref 10–60)
ANA PAT FLD IF-IMP: ABNORMAL
ANA TITR SER: ABNORMAL
ANION GAP SERPL CALC-SCNC: 7 MMOL/L — SIGNIFICANT CHANGE UP (ref 5–17)
AST SERPL-CCNC: 16 U/L — SIGNIFICANT CHANGE UP (ref 10–40)
BASOPHILS # BLD AUTO: 0.03 K/UL — SIGNIFICANT CHANGE UP (ref 0–0.2)
BASOPHILS NFR BLD AUTO: 0.3 % — SIGNIFICANT CHANGE UP (ref 0–2)
BILIRUB SERPL-MCNC: 0.5 MG/DL — SIGNIFICANT CHANGE UP (ref 0.2–1.2)
BUN SERPL-MCNC: 12 MG/DL — SIGNIFICANT CHANGE UP (ref 7–18)
CALCIUM SERPL-MCNC: 8.6 MG/DL — SIGNIFICANT CHANGE UP (ref 8.4–10.5)
CHLORIDE SERPL-SCNC: 97 MMOL/L — SIGNIFICANT CHANGE UP (ref 96–108)
CO2 SERPL-SCNC: 32 MMOL/L — HIGH (ref 22–31)
CREAT SERPL-MCNC: 0.44 MG/DL — LOW (ref 0.5–1.3)
EGFR: 101 ML/MIN/1.73M2 — SIGNIFICANT CHANGE UP
EOSINOPHIL # BLD AUTO: 0.32 K/UL — SIGNIFICANT CHANGE UP (ref 0–0.5)
EOSINOPHIL NFR BLD AUTO: 3.2 % — SIGNIFICANT CHANGE UP (ref 0–6)
GLUCOSE SERPL-MCNC: 115 MG/DL — HIGH (ref 70–99)
HCT VFR BLD CALC: 37 % — SIGNIFICANT CHANGE UP (ref 34.5–45)
HGB BLD-MCNC: 12.1 G/DL — SIGNIFICANT CHANGE UP (ref 11.5–15.5)
IMM GRANULOCYTES NFR BLD AUTO: 0.5 % — SIGNIFICANT CHANGE UP (ref 0–0.9)
LYMPHOCYTES # BLD AUTO: 1.62 K/UL — SIGNIFICANT CHANGE UP (ref 1–3.3)
LYMPHOCYTES # BLD AUTO: 16.2 % — SIGNIFICANT CHANGE UP (ref 13–44)
MAGNESIUM SERPL-MCNC: 2.5 MG/DL — SIGNIFICANT CHANGE UP (ref 1.6–2.6)
MCHC RBC-ENTMCNC: 31.4 PG — SIGNIFICANT CHANGE UP (ref 27–34)
MCHC RBC-ENTMCNC: 32.7 GM/DL — SIGNIFICANT CHANGE UP (ref 32–36)
MCV RBC AUTO: 96.1 FL — SIGNIFICANT CHANGE UP (ref 80–100)
MONOCYTES # BLD AUTO: 1.1 K/UL — HIGH (ref 0–0.9)
MONOCYTES NFR BLD AUTO: 11 % — SIGNIFICANT CHANGE UP (ref 2–14)
NEUTROPHILS # BLD AUTO: 6.89 K/UL — SIGNIFICANT CHANGE UP (ref 1.8–7.4)
NEUTROPHILS NFR BLD AUTO: 68.8 % — SIGNIFICANT CHANGE UP (ref 43–77)
NRBC # BLD: 0 /100 WBCS — SIGNIFICANT CHANGE UP (ref 0–0)
PHOSPHATE SERPL-MCNC: 3.4 MG/DL — SIGNIFICANT CHANGE UP (ref 2.5–4.5)
PLATELET # BLD AUTO: 282 K/UL — SIGNIFICANT CHANGE UP (ref 150–400)
POTASSIUM SERPL-MCNC: 3.5 MMOL/L — SIGNIFICANT CHANGE UP (ref 3.5–5.3)
POTASSIUM SERPL-SCNC: 3.5 MMOL/L — SIGNIFICANT CHANGE UP (ref 3.5–5.3)
PROT SERPL-MCNC: 6.5 G/DL — SIGNIFICANT CHANGE UP (ref 6–8.3)
RBC # BLD: 3.85 M/UL — SIGNIFICANT CHANGE UP (ref 3.8–5.2)
RBC # FLD: 13.2 % — SIGNIFICANT CHANGE UP (ref 10.3–14.5)
SODIUM SERPL-SCNC: 136 MMOL/L — SIGNIFICANT CHANGE UP (ref 135–145)
WBC # BLD: 10.01 K/UL — SIGNIFICANT CHANGE UP (ref 3.8–10.5)
WBC # FLD AUTO: 10.01 K/UL — SIGNIFICANT CHANGE UP (ref 3.8–10.5)
ZINC SERPL-MCNC: 53 UG/DL — SIGNIFICANT CHANGE UP (ref 44–115)

## 2024-04-15 PROCEDURE — 99232 SBSQ HOSP IP/OBS MODERATE 35: CPT

## 2024-04-15 RX ORDER — MORPHINE SULFATE 50 MG/1
15 CAPSULE, EXTENDED RELEASE ORAL EVERY 12 HOURS
Refills: 0 | Status: DISCONTINUED | OUTPATIENT
Start: 2024-04-15 | End: 2024-04-16

## 2024-04-15 RX ADMIN — MORPHINE SULFATE 15 MILLIGRAM(S): 50 CAPSULE, EXTENDED RELEASE ORAL at 18:20

## 2024-04-15 RX ADMIN — Medication 112 MICROGRAM(S): at 05:39

## 2024-04-15 RX ADMIN — PIPERACILLIN AND TAZOBACTAM 25 GRAM(S): 4; .5 INJECTION, POWDER, LYOPHILIZED, FOR SOLUTION INTRAVENOUS at 05:39

## 2024-04-15 RX ADMIN — OXYCODONE HYDROCHLORIDE 10 MILLIGRAM(S): 5 TABLET ORAL at 05:14

## 2024-04-15 RX ADMIN — Medication 1 PATCH: at 07:43

## 2024-04-15 RX ADMIN — Medication 1 PATCH: at 10:49

## 2024-04-15 RX ADMIN — Medication 3 MILLILITER(S): at 03:59

## 2024-04-15 RX ADMIN — HEPARIN SODIUM 5000 UNIT(S): 5000 INJECTION INTRAVENOUS; SUBCUTANEOUS at 05:39

## 2024-04-15 RX ADMIN — Medication 3 MILLILITER(S): at 08:10

## 2024-04-15 RX ADMIN — PIPERACILLIN AND TAZOBACTAM 25 GRAM(S): 4; .5 INJECTION, POWDER, LYOPHILIZED, FOR SOLUTION INTRAVENOUS at 21:53

## 2024-04-15 RX ADMIN — CHLORHEXIDINE GLUCONATE 1 APPLICATION(S): 213 SOLUTION TOPICAL at 06:47

## 2024-04-15 RX ADMIN — OXYCODONE HYDROCHLORIDE 5 MILLIGRAM(S): 5 TABLET ORAL at 06:39

## 2024-04-15 RX ADMIN — OXYCODONE HYDROCHLORIDE 10 MILLIGRAM(S): 5 TABLET ORAL at 08:15

## 2024-04-15 RX ADMIN — OXYCODONE HYDROCHLORIDE 10 MILLIGRAM(S): 5 TABLET ORAL at 04:14

## 2024-04-15 RX ADMIN — HEPARIN SODIUM 5000 UNIT(S): 5000 INJECTION INTRAVENOUS; SUBCUTANEOUS at 21:59

## 2024-04-15 RX ADMIN — MORPHINE SULFATE 15 MILLIGRAM(S): 50 CAPSULE, EXTENDED RELEASE ORAL at 17:37

## 2024-04-15 RX ADMIN — PIPERACILLIN AND TAZOBACTAM 25 GRAM(S): 4; .5 INJECTION, POWDER, LYOPHILIZED, FOR SOLUTION INTRAVENOUS at 13:16

## 2024-04-15 RX ADMIN — Medication 1 MILLIGRAM(S): at 18:45

## 2024-04-15 RX ADMIN — Medication 1 PATCH: at 19:35

## 2024-04-15 RX ADMIN — OXYCODONE HYDROCHLORIDE 10 MILLIGRAM(S): 5 TABLET ORAL at 23:25

## 2024-04-15 RX ADMIN — OXYCODONE HYDROCHLORIDE 10 MILLIGRAM(S): 5 TABLET ORAL at 08:45

## 2024-04-15 RX ADMIN — OXYCODONE HYDROCHLORIDE 5 MILLIGRAM(S): 5 TABLET ORAL at 05:39

## 2024-04-15 RX ADMIN — POLYETHYLENE GLYCOL 3350 17 GRAM(S): 17 POWDER, FOR SOLUTION ORAL at 11:35

## 2024-04-15 RX ADMIN — MORPHINE SULFATE 15 MILLIGRAM(S): 50 CAPSULE, EXTENDED RELEASE ORAL at 11:46

## 2024-04-15 RX ADMIN — Medication 20 MILLIGRAM(S): at 19:17

## 2024-04-15 RX ADMIN — Medication 3 MILLILITER(S): at 14:47

## 2024-04-15 RX ADMIN — BUDESONIDE AND FORMOTEROL FUMARATE DIHYDRATE 2 PUFF(S): 160; 4.5 AEROSOL RESPIRATORY (INHALATION) at 10:03

## 2024-04-15 RX ADMIN — Medication 1 PATCH: at 11:36

## 2024-04-15 RX ADMIN — HEPARIN SODIUM 5000 UNIT(S): 5000 INJECTION INTRAVENOUS; SUBCUTANEOUS at 13:16

## 2024-04-15 RX ADMIN — MORPHINE SULFATE 15 MILLIGRAM(S): 50 CAPSULE, EXTENDED RELEASE ORAL at 10:46

## 2024-04-15 RX ADMIN — Medication 3 MILLILITER(S): at 20:02

## 2024-04-15 RX ADMIN — LOSARTAN POTASSIUM 50 MILLIGRAM(S): 100 TABLET, FILM COATED ORAL at 05:40

## 2024-04-15 NOTE — PROGRESS NOTE ADULT - ASSESSMENT
· Assessment	  ROXY FAN is a 73y Female who presents with a chief complaint of hip fracture    Weight Loss  30lb in 2 years  ·	Patient follows with [facility]  ·	CT abdomen/pelvis not contributory with lack of contrast  ·	chest has infiltrates.  she is long term smoker  ·	Likely PET/CT outpatient.  ·	Will need to see if patient had seen gastroenterology recent for endoscopic examination  ·	No other planned oncologic workup inpatient at this time   multiple compression fractures with pain,  ?kyphoplasy later.  Will continue to follow.

## 2024-04-15 NOTE — PROGRESS NOTE ADULT - PROBLEM SELECTOR PLAN 1
-s/p fall  -CT pelvis shows acute proximal left femoral fracture  -Ortho following -recommending left hip IM nailing  -pt and family undecided about Surgery

## 2024-04-15 NOTE — PROGRESS NOTE ADULT - PROBLEM SELECTOR PLAN 12
-unclear disposition at this time, Ortho is recommending surgical interventions however pt is undecided at this time

## 2024-04-15 NOTE — PROGRESS NOTE ADULT - SUBJECTIVE AND OBJECTIVE BOX
Time of Visit:  Patient seen and examined.     MEDICATIONS  (STANDING):  albuterol/ipratropium for Nebulization 3 milliLiter(s) Nebulizer every 6 hours  budesonide 160 MICROgram(s)/formoterol 4.5 MICROgram(s) Inhaler 2 Puff(s) Inhalation two times a day  chlorhexidine 2% Cloths 1 Application(s) Topical <User Schedule>  gabapentin 100 milliGRAM(s) Oral every 8 hours  heparin   Injectable 5000 Unit(s) SubCutaneous every 8 hours  levothyroxine 112 MICROGram(s) Oral daily  lidocaine   4% Patch 2 Patch Transdermal daily  losartan 50 milliGRAM(s) Oral daily  morphine ER Tablet 15 milliGRAM(s) Oral every 12 hours  naloxone Injectable 0.4 milliGRAM(s) IV Push once  nicotine -   7 mG/24Hr(s) Patch 1 Patch Transdermal daily  piperacillin/tazobactam IVPB.. 3.375 Gram(s) IV Intermittent every 8 hours  polyethylene glycol 3350 17 Gram(s) Oral daily  senna 2 Tablet(s) Oral at bedtime      MEDICATIONS  (PRN):  acetaminophen     Tablet .. 1000 milliGRAM(s) Oral every 8 hours PRN Mild Pain (1 - 3)  albuterol    90 MICROgram(s) HFA Inhaler 2 Puff(s) Inhalation every 6 hours PRN Bronchospasm  ALPRAZolam 1 milliGRAM(s) Oral every 12 hours PRN Anxiety  benzonatate 100 milliGRAM(s) Oral every 8 hours PRN Cough  bisacodyl 5 milliGRAM(s) Oral daily PRN Constipation  cyclobenzaprine 5 milliGRAM(s) Oral every 8 hours PRN Spasm  oxyCODONE    IR 5 milliGRAM(s) Oral every 4 hours PRN Moderate Pain (4 - 6)  oxyCODONE    IR 10 milliGRAM(s) Oral every 4 hours PRN Severe Pain (7 - 10)       Medications up to date at time of exam.    ROS; No fever, chills, nasal congestion , hypoxia and tachypnea. No ss/x of SOB on exam.   PHYSICAL EXAMINATION:  Vital Signs Last 24 Hrs  T(C): 37.1 (15 Apr 2024 13:10), Max: 37.3 (14 Apr 2024 20:51)  T(F): 98.8 (15 Apr 2024 13:10), Max: 99.1 (14 Apr 2024 20:51)  HR: 87 (15 Apr 2024 13:10) (85 - 97)  BP: 152/96 (15 Apr 2024 13:10) (137/73 - 152/96)  BP(mean): --  RR: 18 (15 Apr 2024 13:10) (18 - 18)  SpO2: 95% (15 Apr 2024 13:10) (91% - 95%)    Parameters below as of 15 Apr 2024 13:10  Patient On (Oxygen Delivery Method): nasal cannula  O2 Flow (L/min): 2     (if applicable)    General: Alert and oriented. Able to answer question with no SOB. No acute distress .     HEENT: Head is normocephalic and atraumatic. Extraocular muscles are intact. Mucous membranes are moist.     NECK: Supple, no palpable adenopathy.    LUNGS: Clear to auscultation, no wheezing, rales, or rhonchi.    HEART: Regular rate and rhythm without murmur.    ABDOMEN: Soft, nontender, and nondistended.  No hepatosplenomegaly is noted.    : No bladder distention and tenderness .     NEUROLOGIC: Awake, alert, oriented.     SKIN: Warm and moist .      LABS:                        12.1   10.01 )-----------( 282      ( 15 Apr 2024 07:00 )             37.0     04-15    136  |  97  |  12  ----------------------------<  115<H>  3.5   |  32<H>  |  0.44<L>    Ca    8.6      15 Apr 2024 07:00  Phos  3.4     04-15  Mg     2.5     04-15    TPro  6.5  /  Alb  2.7<L>  /  TBili  0.5  /  DBili  x   /  AST  16  /  ALT  19  /  AlkPhos  113  04-15      Urinalysis Basic - ( 15 Apr 2024 07:00 )    Color: x / Appearance: x / SG: x / pH: x  Gluc: 115 mg/dL / Ketone: x  / Bili: x / Urobili: x   Blood: x / Protein: x / Nitrite: x   Leuk Esterase: x / RBC: x / WBC x   Sq Epi: x / Non Sq Epi: x / Bacteria: x                  Procalcitonin, Serum: 0.08 ng/mL (04-13-24 @ 06:15)      MICROBIOLOGY: (if applicable)    RADIOLOGY & ADDITIONAL STUDIES:  EKG:   CXR: < from: CT Chest No Cont (04.09.24 @ 17:06) >    ACC: [id number] EXAM:  CT CHEST   ORDERED BY: SAMEER KLEIN     PROCEDURE DATE:  04/09/2024          INTERPRETATION:  CLINICAL INDICATION: CHEST PAIN, STATUS POST FALL,   SHORTNESS OF BREATH.    Axial CT images of the chest are obtained without intravenous   administration of contrast.    No prior chest CTs are available for comparison.    No enlarged axillary, mediastinal or hilar lymph nodes.    No pleural or pericardial effusions. Heart size is normal. Mitral annular   calcifications. Aortic intimal calcifications. Aorta is tortuous.    Evaluation of the upper abdomen demonstrate intrahepatic and extrahepatic   biliary ductal dilatation. For reference the partially imaged common bile   duct measures about 1 cm.    Evaluation of the lungs demonstrate emphysema. No pneumothorax. Impaction   of some of the bilateral lower lung segmental and subsegmental airways   likely due to internal secretions. Mild bilateral lower lung   intraparenchymal airways wall thickening, a nonspecific findingmay be   related to the history of smoking.    Right upper lobe apical branching linear ill-defined opacity containing   internal foci of calcification, part of which measures about 2.4 x 1.5 cm   may be due to scarring.    Cluster of a few ill-defined nodular and patchy opacities within the   superior segment of the right lower lobe largest measuring about 2.2 cm.   Other scattered bilateral nodular opacities are also noted with 1 of the   larger 1's in the right lower lobe in a para-aortic location measuring   about 6 mm.    No central endobronchial lesions.    Degenerative changes of the spine spine. Bones are osteopenic.    Loss of height of multiple thoracic and lumbar vertebral bodies which are   of indeterminate age. For reference there is severe loss of height of the   T7 and moderate loss of height of the T6 and T9 vertebral bodies. Spinal   kyphosis.    IMPRESSION: Loss of height of multiple thoracic and lumbar spine   vertebral bodies which are of indeterminate age. Clinical correlation for   back pain is recommended. Dedicated spinal MRI can be performed for   complete evaluation.    Dominant cluster of nodular and patchy opacities within the superior   segment of the right lower lobe suggestive of infection with   endobronchial spread. A 1-3 month follow-up noncontrast chest CT is   recommended to ensure improvement/resolution.    Emphysema.    Intrahepatic and extrahepatic biliary ductal dilatation is of unclear   etiology. Upper abdominal ultrasound can be performedfor further   evaluation as clinically warranted.    --- End of Report ---            GUERO KNIGHT MD; Attending Radiologist  This document has been electronically signed. Apr 9 2024  5:20PM    < end of copied text >    ECHO:    IMPRESSION: 73y Female PAST MEDICAL & SURGICAL HISTORY:  HTN (hypertension)      Hypothyroidism      COPD (chronic obstructive pulmonary disease)      Spinal stenosis      Current smoker      H/O foot surgery      Impression: This is a 72 Y/O Female with s/p multiple Falls at Home and sustained Left Hip fracture . Presented with complains of episode of SOB , hypoxia in ED with o2 saturation 80s room air due to Acute hypoxic respiratory failure secondary to combination of Right lung Pneumonia showing on CT chest   and COPD exacerbation , Active smoker . Negative swab for RVP, Covid. Negative UA for legionella .     Suggestion:  O2 saturation 94% room air at rest . So far no SOB , room air . Can have Oxygen supplementation 2L NC if needed  .  Continue Duoneb via nebulization Q 6 hours . Do not give at the same time with PRN Albuterol 90 mcg 2 puffs Q 6 Hours.   Continue Symbicort 160- 4.5 mcg 2 puffs Twice daily. using Wixela Twice daily outpatient .   On Zosyn 3.375 Gm IVPB Q 8 Hours .  Orthopedic following .   Reinforced the importance of smoking cessation  ( is a smoker too ) , may not quit but willing to continue  the Nicotine patch   , On nicotine patch on x 12 hours.   Spoke to  and patient at bedside the benefit for her ADL status  the propose Orthopedic Sx plan for left hip IM nailing ,  she has moderate perioperative risk with Ariscat pulmonary assessment . All pulmonary questions answered , plan of care for her COPD, on current antibiotic for Pneumonia including the smoking cessation well detailed discussed with both  and patient . Patient insisted that she just want to go Home .

## 2024-04-15 NOTE — PROGRESS NOTE ADULT - ASSESSMENT
Patient is a 73y old  Female who is from home ambulates with a walker at baseline with PMH of HTN, COPD ( not on O2 at home), current smoker with 60 ppy hx, hypothyroidism, spinal stenosis urinary incontinence, now presents to the ER for evaluation of left-sided lower back and hip pain after fall 2 weeks ago. Approximately 1-2 weeks ago the patient fell while walking and her leg "gave out" and since then she has had left-sided leg pain.  Patient reports she is able to walk with her cane/walker but feels very weak and unbalanced and has since fallen 2 more times. Pt also complains of SOB and chest pain since the fall today after her cane hit her chest.  On admission, she has no fever but found to be hypoxic on RA to 80s saturating well on 1-2 L NC. The CT chest shows Dominant cluster of nodular and patchy opacities within the superior segment of the right lower lobe suggestive of infection with endobronchial spread. Emphysema. Intrahepatic and extrahepatic biliary ductal dilatation is of unclear etiology. CT pelvis shows Acute proximal left femoral fracture,  Age-indeterminate severe compression fracture of the L5 vertebral body. Pt is admitted for L hip fracture/ SOB 2/2 PNA vs COPD exacerbation. She has started on Zosyn and the ID consult requested to assist with further evaluation and antibiotic management.    # Pneumonia - Legionella urine Ag and MRSA PCR is negative   # Left femoral fracture, s/p Fall- Surgery on 4/11 as per Ortho, but was not done since patient is not optimized yet    would recommend :    1. Obtain Procalciotnin level in Am and if elevated then obtain CT chest since c/o intermittent short of breath  2, Monitor oxygen saturation and continue Supplemental oxygenation, Bronchodilator as needed  3. Aspiration precaution  4. Continue Zosyn until 4/17/24, may change to oral  Augmentin 875mg q 12hours on discharge if discharge before 4/17- patient still not  sure if she wants Surgery or not  5. Pain management as needed    d/w Covering NP, Gurvinder, patient and family at the bed side     Attending Attestation:    Spent more than 35 minutes on total encounter, more than 50 % of the visit was spent counseling and/or coordinating care by the Attending physician.

## 2024-04-15 NOTE — PROGRESS NOTE ADULT - SUBJECTIVE AND OBJECTIVE BOX
Patient is a 73y old  Female who presents with a chief complaint of L hip fracture (15 Apr 2024     OVERNIGHT EVENTS: pt reports feeling weak, undecided about the surgery     REVIEW OF SYSTEMS:  CONSTITUTIONAL: +ve weakness   RESPIRATORY: No cough, SOB  CARDIOVASCULAR: No chest pain, palpitations  GASTROINTESTINAL: No abdominal pain  GENITOURINARY: No dysuria  NEUROLOGICAL: No HA  SKIN: No itching, burning, rashes, or lesions   MUSCULOSKELETAL: +ve left hip pain     T(C): 37.1 (04-15-24 @ 13:10), Max: 37.3 (04-14-24 @ 20:51)  HR: 87 (04-15-24 @ 13:10) (85 - 97)  BP: 152/96 (04-15-24 @ 13:10) (137/73 - 152/96)  RR: 18 (04-15-24 @ 13:10) (18 - 18)  SpO2: 95% (04-15-24 @ 13:10) (91% - 95%)  Wt(kg): --Vital Signs Last 24 Hrs  T(C): 37.1 (15 Apr 2024 13:10), Max: 37.3 (14 Apr 2024 20:51)  T(F): 98.8 (15 Apr 2024 13:10), Max: 99.1 (14 Apr 2024 20:51)  HR: 87 (15 Apr 2024 13:10) (85 - 97)  BP: 152/96 (15 Apr 2024 13:10) (137/73 - 152/96)  BP(mean): --  RR: 18 (15 Apr 2024 13:10) (18 - 18)  SpO2: 95% (15 Apr 2024 13:10) (91% - 95%)    Parameters below as of 15 Apr 2024 13:10  Patient On (Oxygen Delivery Method): nasal cannula  O2 Flow (L/min): 2    MEDICATIONS  (STANDING):  albuterol/ipratropium for Nebulization 3 milliLiter(s) Nebulizer every 6 hours  budesonide 160 MICROgram(s)/formoterol 4.5 MICROgram(s) Inhaler 2 Puff(s) Inhalation two times a day  chlorhexidine 2% Cloths 1 Application(s) Topical <User Schedule>  gabapentin 100 milliGRAM(s) Oral every 8 hours  heparin   Injectable 5000 Unit(s) SubCutaneous every 8 hours  levothyroxine 112 MICROGram(s) Oral daily  lidocaine   4% Patch 2 Patch Transdermal daily  losartan 50 milliGRAM(s) Oral daily  morphine ER Tablet 15 milliGRAM(s) Oral every 12 hours  naloxone Injectable 0.4 milliGRAM(s) IV Push once  nicotine -   7 mG/24Hr(s) Patch 1 Patch Transdermal daily  piperacillin/tazobactam IVPB.. 3.375 Gram(s) IV Intermittent every 8 hours  polyethylene glycol 3350 17 Gram(s) Oral daily  senna 2 Tablet(s) Oral at bedtime    MEDICATIONS  (PRN):  acetaminophen     Tablet .. 1000 milliGRAM(s) Oral every 8 hours PRN Mild Pain (1 - 3)  albuterol    90 MICROgram(s) HFA Inhaler 2 Puff(s) Inhalation every 6 hours PRN Bronchospasm  ALPRAZolam 1 milliGRAM(s) Oral every 12 hours PRN Anxiety  benzonatate 100 milliGRAM(s) Oral every 8 hours PRN Cough  bisacodyl 5 milliGRAM(s) Oral daily PRN Constipation  cyclobenzaprine 5 milliGRAM(s) Oral every 8 hours PRN Spasm  oxyCODONE    IR 5 milliGRAM(s) Oral every 4 hours PRN Moderate Pain (4 - 6)  oxyCODONE    IR 10 milliGRAM(s) Oral every 4 hours PRN Severe Pain (7 - 10)    PHYSICAL EXAM:  GENERAL: NAD  EYES: clear conjunctiva  ENMT: Moist mucous membranes  NECK: Supple, No JVD  CHEST/LUNG: dec breath sounds at bases  HEART: S1, S2,+  ABDOMEN: Soft, Nontender, Nondistended; Bowel sounds present  NEURO: Alert & Oriented X3  EXTREMITIES: No LE edema, nleft leg with limited ROM due to pain   SKIN: No rashes or lesions    Consultant(s) Notes Reviewed:  [x ] YES  [ ] NO  Care Discussed with Consultants/Other Providers [ x] YES  [ ] NO    LABS:                        12.1   10.01 )-----------( 282      ( 15 Apr 2024 07:00 )             37.0     04-15    136  |  97  |  12  ----------------------------<  115<H>  3.5   |  32<H>  |  0.44<L>    Ca    8.6      15 Apr 2024 07:00  Phos  3.4     04-15  Mg     2.5     04-15    TPro  6.5  /  Alb  2.7<L>  /  TBili  0.5  /  DBili  x   /  AST  16  /  ALT  19  /  AlkPhos  113  04-15    CAPILLARY BLOOD GLUCOSE  Urinalysis Basic - ( 15 Apr 2024 07:00 )  Color: x / Appearance: x / SG: x / pH: x  Gluc: 115 mg/dL / Ketone: x  / Bili: x / Urobili: x   Blood: x / Protein: x / Nitrite: x   Leuk Esterase: x / RBC: x / WBC x   Sq Epi: x / Non Sq Epi: x / Bacteria: x    RADIOLOGY & ADDITIONAL TESTS:  < from: MR Thoracic Spine No Cont (04.11.24 @ 19:15) >    IMPRESSION:    1. BRAIN:   Basal ganglia lacunar infarctions. Ischemic white matter   disease and atrophy typical for age    2. CERVICAL SPINE:   Grade 1 anterolisthesis C4 on C5. Low-grade cervical   degenerative disc disease. Cervical cord appears intact.    3. THORACIC SPINE:   Exaggeration in mid thoracic kyphosis with multiple   thoracic endplate deformities that are age indeterminate but felt likely   to be long-standing. Thoracic cord appears intact.      < end of copied text >  < from: CT Pelvis Bony Only No Cont (04.09.24 @ 17:07) >    ACC: [id number] EXAM:  CT PELVIS BONY ONLY   ORDERED BY: SAMEER KLEIN     PROCEDURE DATE:  04/09/2024          INTERPRETATION:  INDICATION:  left hip pain s/p fall    TECHNIQUE: CT of the pelvis without contrast with axial, sagittal, and   coronal multiplanar reformats.    Comparison:None available    FINDINGS:    Bones are diffusely demineralized.    Acute displaced, slightly comminuted fracture of the intertrochanteric   region of the proximal left femur with extension into the basicervical   and transcervical region of the left femoral neck superolaterally. Left   femoral head remains seated within the acetabulum. Bilateral hip joint   spaces are relatively preserved.    Age-indeterminate severe compression fracture of L5.    Degenerative changes within the visualized lower lumbar spine and   sacroiliac joints.    Soft tissue swelling/hematoma about the left hip fracture site. Stool   throughout the colon. Atherosclerotic calcifications are noted.    IMPRESSION:    Acute proximal left femoral fracture, as above.    Age-indeterminate severe compression fracture of the L5 vertebral body.    < end of copied text >

## 2024-04-15 NOTE — PROGRESS NOTE ADULT - ASSESSMENT
73-year-old female from home ambulates with a walker at baseline with PMHx of HTN, COPD (not on O2 at home), current smoker with 60 ppy hx, hypothyroidism, spinal stenosis, urinary incontinence, presenting with left-sided lower back and hip pain. CTH and cervical spine shows No gross acute intracranial hemorrhage.   CT pelvis shows acute proximal left femoral fracture. Pt is admitted for L hip fracture and COPD exacerbation 2/2 PNA.  Ortho following, recommending left hip IM nailing. Pt is not agreeable for the procedure at this time,   Also, there was a concern for slurred speech and possible stroke. Neuro following, recommending adequate imaging evaluation of her spine. Pt reports allergy to contrast (unable to recall reaction). MR head and spine with no acute findings.   Nephro consulted for hyponatremia and pulmonology for COPD exacerbation, treated with Prednisone.   Pending pt's decision about Surgery

## 2024-04-15 NOTE — PROGRESS NOTE ADULT - SUBJECTIVE AND OBJECTIVE BOX
Source of information: [facility], Chart review  Patient language: English  : n/a    HPI:  73-year-old female from home ambulates with a walker at baseline with PMH of HTN, COPD ( not on O2 at home), current smoker with 60 ppy hx, hypothyroidism, spinal stenosis urinary incontinence, presenting with left-sided lower back and hip pain after fall 2 weeks ago. Approximately 1-2 weeks ago the patient fell while walking and her leg "gave out" and since then she has had left-sided leg pain.  Patient reports she is able to walk with her cane/walker but feels very weak and unbalanced and has since fallen 2 more times. Pt also complains of SOB. Patient reporting chest pain since the fall today after her cane hit her chest. Patient denies any headache, dizziness, cough, congestion, fever, chills, N,V,D,Abd pain, numbness or tingling. In the ED, pt is comfortable, but complains of pain, NAD, pt is wheezing b/l with R sided crackles, pt was found to be hypoxic on RA to 80s saturating well on 1-2 L NC, hypertensive 175/105, wbc 9, Na 130. CTH and cervical spine shows No gross acute intracranial hemorrhage. No gross acute fracture cervical spine. CTC shows Loss of height of multiple thoracic and lumbar spine vertebral bodies which are of indeterminate age. Dominant cluster of nodular and patchy opacities within the superior segment of the right lower lobe suggestive of infection with endobronchial spread. Emphysema. Intrahepatic and extrahepatic biliary ductal dilatation is of unclear etiology. CT pelvis shows Acute proximal left femoral fracture,  Age-indeterminate severe compression fracture of the L5 vertebral body. Pt is admitted for L hip fracture/ SOB 2/2 PNA vs COPD exacerbation.    Off note: pt states that she lost about 30 lbs in the last 2 months. She is undergoing colonoscopy o/p to evaluate for weight loss. pt denies any blood in the stool. However, she has family history of colon ca in her father.  (09 Apr 2024 19:22)    This is a Patient is a 73y old  Female who presents with a chief complaint of L hip fracture (13 Apr 2024 15:04)    X-ray of pelvis on 4/9 demonstrated diffuse bony demineralization with a fracture along the intertrochanteric line of the proximal left femur, as detailed above. No dislocation of the hips.    CTAP 4/11 demonstrated osteoporosis, moderate to severe L1, L2, L4 and L5 compression fractures. No osseous retropulsion.    CT Pelvis 4/9 demonstrated acute proximal left femoral fracture, as above. Age-indeterminate severe compression fracture of the L5 vertebral body.    Pt is admitted for s/p fall, left hip pain. CT pelvis shows cute proximal left femoral fracture, Age-indeterminate severe compression fracture of the L5 vertebral body. CTAP 4/11 demonstrated moderate to severe L1, L2, L4 and L5 compression fractures. Pain consulted for left hip pain 4/11. Pt with Hx of chronic opioid dependence. I-Stop reviewed. Pt on Morphine 60 mg ER PO BID, last refill on 3/2024, oxycodone and Xanax. Pt seen and examined at bedside this morning with  in room. Pt found lying in bed, awake, alert and oriented x 3, speech clear. No acute distress or facial grimacing noted. Pt reports pain score "15/10" on left hip and lowerback, currently not tolerable. Pt stating being very uncomfortable in bed, not feeling well enough to have surgery, having cough and feeling congested. Pt wishing to go home to get better and then comeback for surgery. SALE USED: (1-10 VNRS). Pt describes pain as sharp pain, aching and is exacerbated by movement especially during position changes. Pain is partially alleviated by pain medication and exacerbated by movement. Pt stating she is not taking her "normal pain meds from home. I-Stop reviewed. Discussed with pt if she was taking Morphine ER 60 mg po BID. Pt reported intially "she doesn't remenber", Then stated she took Morphine,  but only half pill. Pt tolerating PO diet. Denies lethargy, nausea, vomiting, constipation, itchiness. Reports last BM 4/13. Patient stated goal for pain control: to be able to take deep breaths, get out of bed to chair and ambulate with tolerable pain control. Pt reports walking with cane and walker baseline. Pt being followed by Ortho. Discussed with pt will start Morphine ER 15 mg po q12h, pt amenable with plan.     PAST MEDICAL & SURGICAL HISTORY:  HTN (hypertension)    Hypothyroidism    COPD (chronic obstructive pulmonary disease)    Spinal stenosis    Current smoker    H/O foot surgery    FAMILY HISTORY:  FH: colon cancer (Father)    Social History:  lives with  ambulates with a cane or walker (09 Apr 2024 19:22)   [x]Denies ETOH use, illicit drug use  + smoking    Allergies    contrast media (iodine-based) (Unknown)    Intolerances    MEDICATIONS  (STANDING):  albuterol/ipratropium for Nebulization 3 milliLiter(s) Nebulizer every 6 hours  budesonide 160 MICROgram(s)/formoterol 4.5 MICROgram(s) Inhaler 2 Puff(s) Inhalation two times a day  chlorhexidine 2% Cloths 1 Application(s) Topical <User Schedule>  gabapentin 100 milliGRAM(s) Oral every 8 hours  heparin   Injectable 5000 Unit(s) SubCutaneous every 8 hours  levothyroxine 112 MICROGram(s) Oral daily  lidocaine   4% Patch 2 Patch Transdermal daily  losartan 50 milliGRAM(s) Oral daily  morphine ER Tablet 15 milliGRAM(s) Oral every 12 hours  naloxone Injectable 0.4 milliGRAM(s) IV Push once  nicotine -   7 mG/24Hr(s) Patch 1 Patch Transdermal daily  piperacillin/tazobactam IVPB.. 3.375 Gram(s) IV Intermittent every 8 hours  polyethylene glycol 3350 17 Gram(s) Oral daily  senna 2 Tablet(s) Oral at bedtime    MEDICATIONS  (PRN):  acetaminophen     Tablet .. 1000 milliGRAM(s) Oral every 8 hours PRN Mild Pain (1 - 3)  albuterol    90 MICROgram(s) HFA Inhaler 2 Puff(s) Inhalation every 6 hours PRN Bronchospasm  ALPRAZolam 1 milliGRAM(s) Oral every 12 hours PRN Anxiety  benzonatate 100 milliGRAM(s) Oral every 8 hours PRN Cough  bisacodyl 5 milliGRAM(s) Oral daily PRN Constipation  cyclobenzaprine 5 milliGRAM(s) Oral every 8 hours PRN Spasm  oxyCODONE    IR 5 milliGRAM(s) Oral every 4 hours PRN Moderate Pain (4 - 6)  oxyCODONE    IR 10 milliGRAM(s) Oral every 4 hours PRN Severe Pain (7 - 10)    Vital Signs Last 24 Hrs  T(C): 37.1 (15 Apr 2024 13:10), Max: 37.3 (14 Apr 2024 20:51)  T(F): 98.8 (15 Apr 2024 13:10), Max: 99.1 (14 Apr 2024 20:51)  HR: 87 (15 Apr 2024 13:10) (85 - 97)  BP: 152/96 (15 Apr 2024 13:10) (137/73 - 152/96)  BP(mean): --  RR: 18 (15 Apr 2024 13:10) (18 - 18)  SpO2: 95% (15 Apr 2024 13:10) (91% - 95%)    Parameters below as of 15 Apr 2024 13:10  Patient On (Oxygen Delivery Method): nasal cannula  O2 Flow (L/min): 2    SARS-CoV-2: NotDetec (09 Apr 2024 21:24)    LABS: Reviewed                        12.1   10.01 )-----------( 282      ( 15 Apr 2024 07:00 )             37.0     04-15    136  |  97  |  12  ----------------------------<  115<H>  3.5   |  32<H>  |  0.44<L>    Ca    8.6      15 Apr 2024 07:00  Phos  3.4     04-15  Mg     2.5     04-15    TPro  6.5  /  Alb  2.7<L>  /  TBili  0.5  /  DBili  x   /  AST  16  /  ALT  19  /  AlkPhos  113  04-15    LIVER FUNCTIONS - ( 15 Apr 2024 07:00 )  Alb: 2.7 g/dL / Pro: 6.5 g/dL / ALK PHOS: 113 U/L / ALT: 19 U/L DA / AST: 16 U/L / GGT: x           Urinalysis Basic - ( 15 Apr 2024 07:00 )    Color: x / Appearance: x / SG: x / pH: x  Gluc: 115 mg/dL / Ketone: x  / Bili: x / Urobili: x   Blood: x / Protein: x / Nitrite: x   Leuk Esterase: x / RBC: x / WBC x   Sq Epi: x / Non Sq Epi: x / Bacteria: x    CAPILLARY BLOOD GLUCOSE    SARS-CoV-2: NotDetec (09 Apr 2024 21:24)    Radiology: Reviewed  ACC: [id number] EXAM:  MR BRAIN   ORDERED BY: TAMMI CHRISTIE     ACC: [id number] EXAM:  MR SPINE THORACIC   ORDERED BY: TAMMI CHRISTIE     ACC: [id number] EXAM:  MR SPINE CERVICAL   ORDERED BY: TAMMI CHRISTIE     PROCEDURE DATE:  04/11/2024      INTERPRETATION:  Three examinations were performed in this patient:  1.  MR of the brain without gadolinium contrast  2.  MR cervical spine without gadolinium contrast  3.  MR thoracic spine without gadolinium contrast    CLINICAL INFORMATION: Post fall trauma cord compression  HMR  Admitting   Dxs: S72.009A FRACTURE OF UNSP PART OF NECK OF UNSP FEMUR, INIT    TECHNIQUE:  1. MR brain:  Sagittal and axial T1-weighted images, axial FLAIR images,   axial susceptibility weighted images, axial T2-weighted images and axial   diffusion weighted images of the brain were obtained.  2.  MR cervical spine:   Sagittal T2-weighted images were obtained.  3.  MR thoracic spine:   Sagittal T2-weighted images were obtained.  The remainder these examinations could not be completed, not tolerated by   the patient. The images that could be obtained are degraded by patient   motion.  CONTRAST:    None    COMPARISON:   CT head 4/10/2023 and CT cervical and CT chest 4/9/2023    FINDINGS:    BRAIN    BRAIN:   The brain demonstrates small focal lesions of remote deep   infarction. The most conspicuous are noted in the external capsules. No   cerebral cortical lesion is recognized.   No diffusion restriction is   found in the brain.  No acute cerebral cortical infarct is found.   No   intracranial hemorrhage is recognized.  No mass effect is found in the   brain.   The brain also demonstrates patchy indistinct lesions within the   deep cerebral hemispheric white matter typical for ischemic white matter   disease.    CSF SPACES:   The ventricles, sulci and basal cisterns appear moderately   dilated reflecting diffuse brain volume loss.    VESSELS:   The vertebral and internal carotid arteries demonstrate   expected flow voids indicating their patency.    HEAD AND NECK STRUCTURES:   The orbits are unremarkable.  Paranasal   sinuses are clear.  The nasal cavity demonstrates left-to-right septal   deviation. Left middle turbinate tristen bullosa is present..  The central   skull base appears intact.  The nasopharynx is symmetric.  The temporal   bones appear clear of disease.  The calvarium appears unremarkable.    CERVICAL SPINE    Cervical vertebral alignment is significant for grade 1 anterior   listhesis C4 on C5, also slight retrolisthesis C5 on C6.   Facet joints   appear aligned.   Cervical vertebral body heights are maintained.   Cervical vertebral marrow signal intensity mildly heterogeneous from   degenerative endplate changes. These include small marginal osteophytes   most prominently at C5-C6.  No osseous expansion or epidural disease is   found.  No destructive bone lesion is found.   There are osteoarthritic   changes at the articulation of the anterior ring of C1 and the odontoid   process of C2.  This arthropathy includes marginal osteophytes,   subchondral sclerosis and joint space narrowing.    Cervical intervertebral disc spaces demonstrate a generalized pattern of   degeneration characterized by signal intensity loss on the long TR   images. Degenerative disc heightloss appears greatest at C5-C6.   Evaluation of degenerative disc disease is limited in the absence of   axial images.   However, no degenerative high-grade central canal   compromise is recognized.  Neural foramina appear narrowed by disc   materialand uncovertebral joint osteophytes most prominently at the   lower cervical intervertebral disc levels..    Cervical cord morphology Is preserved.  The cervical cord maintains   intact signal intensity   No focal intrinsic cord lesion is identified.   No cord expansion or cord volume loss is recognized.    The visualized adjacent neck structures appear intact.  No neck mass is   recognized.  Paraspinal soft tissues appear intact.  Visualized lymph   nodes appear to be within physiologic size limits.    THORACIC SPINE    Thoracic vertebral alignment demonstrates exaggeration of the mid   thoracic kyphosis. This vertebral malalignment has an apex at the T7   inferior endplate compression fracture. Approximately 70% height loss   results at this level. More limited compression fractures involve the T6   inferior endplate and the T9 superior endplate. Schmorl's nodes result.   More limited superior endplate deformity of T5 and T11. The remaining   thoracic vertebral body heights are maintained. Superior endplate   deformities are also present at L1-L2 and L4. At the L2 vertebral body   burst fracture pattern is present with impression upon the ventral thecal   sac but does not appear to compromise the cauda equina. At the thoracic   vertebral fracture is no high-grade central canal compromise is   recognized. Thoracic vertebral marrow signal intensity relatively   homogeneous without recognition of significant marrow edema.  No   destructive bone lesion, osseous expansion or epidural disease is found.    The paraspinal soft tissues appear intact.    Thoracic intervertebral disc spaces demonstrate a generalized pattern of   at least mild degeneration with signal intensity loss on the long TR   images. Disc heights are largely maintained.   No high-grade degenerative   central canal compromise is recognized. Thoracic neural foramina appear   intact.    The thoracic cord morphology is preserved.  The thoracic cord maintains   intact signal intensity.    IMPRESSION:    1. BRAIN:   Basal ganglia lacunar infarctions. Ischemic white matter   disease and atrophy typical for age    2. CERVICAL SPINE:   Grade 1 anterolisthesis C4 on C5. Low-grade cervical   degenerative disc disease. Cervical cord appears intact.    3. THORACIC SPINE:   Exaggeration in mid thoracic kyphosis with multiple   thoracic endplate deformities that are age indeterminate but felt likely   to be long-standing. Thoracic cord appears intact.    4. Only limited incomplete examinations could be performed on this date.   Recommend completion scans once tolerated by the patient.    --- End of Report ---    GRAZYNA CRAWLEY MD; Attending Radiologist  This document has been electronically signed. Apr 11 2024  8:02PM    ORT Score -   Family Hx of substance abuse	Female	      Male  Alcohol 	                                           1                     3  Illegal drugs	                                   2                     3  Rx drugs                                           4 	                  4  Personal Hx of substance abuse		  Alcohol 	                                          3	                  3  Illegal drugs                                     4	                  4  Rx drugs                                            5 	                  5  Age between 16- 45 years	           1                     1  hx preadolescent sexual abuse	   3 	                  0  Psychological disease		  ADD, OCD, bipolar, schizophrenia   2	          2  Depression                                           1 	          1  Total: 5    a score of 3 or lower indicates low risk for opioid abuse		  a score of 4-7 indicates moderate risk for opioid abuse		  a score of 8 or higher indicates high risk for opioid abuse    REVIEW OF SYSTEMS:  CONSTITUTIONAL: No fever or fatigue  HEENT:  No difficulty hearing, no change in vision  NECK: No pain or stiffness  RESPIRATORY: + cough, no wheezing, chills or hemoptysis; + intermittent shortness of breath, +congested  CARDIOVASCULAR: No chest pain, palpitations, dizziness, or leg swelling  GASTROINTESTINAL: No loss of appetite, decreased PO intake. No abdominal or epigastric pain. No nausea, vomiting; No diarrhea or constipation.   GENITOURINARY: No dysuria, frequency, hematuria, retention, + incontinence  MUSCULOSKELETAL: + back pain; +left hip pain, + falls   NEURO: No headaches, No numbness/tingling b/l LE, No weakness  ENDOCRINE: No polyuria, polydipsia, heat or cold intolerance; No hair loss  PSYCHIATRIC: No depression, Hx anxiety, no difficulty sleeping    PHYSICAL EXAM:  GENERAL:  Alert & Oriented X 4, cooperative, NAD, Good concentration. Speech is clear.   RESPIRATORY: Respirations even and unlabored, BS decreased to the bases, +congested, on 2L NC  CARDIOVASCULAR: Normal S1/S2, regular rate and rhythm; No murmurs, rubs, or gallops. No JVD.   GENITOURINARY: Eli catheter in place with clear yellow urine.  GASTROINTESTINAL:  Soft, Nontender, Nondistended; Bowel sounds present  PERIPHERAL VASCULAR:  Extremities warm without edema. 2+ Peripheral Pulses, No cyanosis, No calf tenderness  MUSCULOSKELETAL: Motor Strength 5/5 B/L upper and 3/5 lower extremities; moves all extremities equally against gravity; ROM decreased to LLE; + tenderness on palpation of left hip and lower back.  SKIN: Warm, dry, intact. No rashes, lesions, scars or wounds. +bruise noted on right cheek noted.    Risk factors associated with adverse outcomes related to opioid treatment  [x]  Concurrent benzodiazepine use  [ ]  History/ Active substance use or alcohol use disorder  [x] Psychiatric co-morbidity  [ ] Sleep apnea  [x] COPD  [ ] BMI> 35  [ ] Liver dysfunction  [ ] Renal dysfunction  [ ] CHF  [x] Smoker  [x]  Age > 60 years    [x]  [place]  Reviewed and Copied to Chart. See below.    Plan of care and goal oriented pain management treatment options were discussed with patient and /or primary care giver; all questions and concerns were addressed and care was aligned with patient's wishes.    Educated patient on goal oriented pain management treatment options     04-15-24 @ 13:50

## 2024-04-15 NOTE — PROGRESS NOTE ADULT - PROBLEM SELECTOR PLAN 4
-complains of slurred speech on admission, now resolved   -imaging negative for acute findings  -MR hrad and spine with no acute findings   -Neuro Dr Keyes followed -complains of slurred speech on admission, now resolved   -imaging negative for acute findings  -MR head and spine with no acute findings   -Neuro Dr Keyes followed

## 2024-04-15 NOTE — PROGRESS NOTE ADULT - PROBLEM SELECTOR PLAN 1
Pt with acute left hip pain which is somatic and neuropathic in nature due to left femoral fracture. Pt also with lower back pain - CTAP 4/11 demonstrated moderate to severe L1, L2, L4 and L5 compression fractures. CT pelvis shows acute proximal left femoral fracture, Ortho consulted, recommends left hip IM nailing- pt is being optimized for surgery. Hx of chronic opioid use. High risk medications reviewed. Avoid polypharmacy. Avoid IV opioids. Avoid NSAIDs. Non-pharmacological sleep aides maintained Non-opioid medications and non-pharmacological pain management measures maintained.   Opioid pain recommendations   - Continue Oxycodone 5-10 mg PO q 4 hours PRN moderate-severe pain. Monitor for sedation/ respiratory depression.   - Added Morphine ER 15 mg po q12h (home dose- start low dose and titrate appropriately) if no contraindications).  Non-opioid pain recommendations   - Continue Acetaminophen 1 gram PO q 8 hours PRN for breakthrough pain) Monitor LFTs  - Continue Gabapentin 100 mg po q8h. Monitor renal function.  - Continue Lidocaine 4% patches.  - Continue Flexeril 5 mg po q8h PRN Monitor for increased somnolence, dizziness.  Bowel Regimen  - Continue Miralax 17G PO daily  - Continue Senna 2 tablets at bedtime for constipation  Mild pain   - Non-pharmacological pain treatment recommendations  - Warm/ Cool packs PRN   - Repositioning, guided imagery, relaxation, distraction.  - Physical therapy OOB if no contraindications   Recommendations discussed with primary team and RN.

## 2024-04-15 NOTE — PROGRESS NOTE ADULT - SUBJECTIVE AND OBJECTIVE BOX
ROXY FAN  MRN-[id number]    Patient has been seen and communicated with on multiple occasions, along with family members, 1 daughter, 1 son, 1 . They have been very invested in her treatment and are clearly documenting all the visitations as they happen.   Patient is very uncomfortable with the idea of surgery right now, citing her discomfort, her need to care for her right foot bunion which has had history of infection, and her current cough and congestion with a diagnosed pneumonia  patient is being followed by multiple specialties which have communicated no contraindications to surgery, but patient herself does not feel comfortable with surgery. she ideally wants to go home, recuperate, and come back to do surgery when she feels ready. she is against the idea of going to [facility], but her family is against her going home. she has difficulty taking deep breaths.   Pt denies Chest pain,  dyspnea, paresthesias, N/V/D, abdominal pain, syncope, or pain anywhere else.     Vital Signs Last 24 Hrs  T(C): 36.4 (04-15-24 @ 05:05), Max: 37.3 (04-14-24 @ 20:51)  T(F): 97.5 (04-15-24 @ 05:05), Max: 99.1 (04-14-24 @ 20:51)  HR: 85 (04-15-24 @ 05:05) (85 - 97)  BP: 142/89 (04-15-24 @ 05:05) (137/73 - 159/92)  BP(mean): --  RR: 18 (04-15-24 @ 05:05) (18 - 18)  SpO2: 95% (04-15-24 @ 05:05) (91% - 95%)        Physical Exam:    General: AAOx3, NAD, resting comfortably in bed.    L Hip: Skin is pink and warm. Tender at the fracture site. Decreased ROM of the affected hip due to pain. SILT.  Positive logroll test.  Lower extremity:  No calf tenderness, calves are soft. 2+pulses. NVI. (+)EHL/FHL/ADF/APF. Noted to have chronic LE skin changes from stasis, Good capillary refill. Warm, well-perfused. SILT.                                                  12.6   10.15 )-----------( 233      ( 14 Apr 2024 05:55 )             37.7   04-14    137  |  98  |  10  ----------------------------<  104<H>  3.3<L>   |  34<H>  |  0.45<L>    Ca    8.5      14 Apr 2024 05:55  Phos  2.9     04-14  Mg     2.2     04-14    TPro  6.7  /  Alb  2.9<L>  /  TBili  0.7  /  DBili  x   /  AST  19  /  ALT  20  /  AlkPhos  109  04-14        Impression:  74 y/o F with L Hip Basicervical Fracture    Plan:  -  D/w Dr. Lang- Recommends left hip IM nailing   -  Pain control  -  DVT prophylaxis  -  Medical Optimization  - clearances need to be written by pulmonology, medicine, as these were the reasons cited for not having surgery by the family and the patient. attempts to persuade patient once these clearances align will be made.   otherwise patient is refusing surgery, wants to be discharged and f/u on an outpt basis to do surgery.   -  Consent: Pending  -  NWB to LLE  -  Case d/w Dr. Lang and medicine NP

## 2024-04-15 NOTE — PROGRESS NOTE ADULT - PROBLEM SELECTOR PLAN 8
-hx of spinal stenosis takes morphine and percocet at home  -cont  pain regimen  -Pain mgmt following

## 2024-04-15 NOTE — PROGRESS NOTE ADULT - PROBLEM SELECTOR PLAN 2
-hx of COPD not on home O2, and current smoker  -CT chest shows Loss of height of multiple thoracic and lumbar spine vertebral bodies which are of indeterminate age. Dominant cluster of nodular and patchy opacities within the superior segment of the right lower lobe suggestive of infection with endobronchial spread. Emphysema  -cont Prednisone taper   -cont Albuterol PRN, Duonebs and Symbicort   -supplemental oxygen PRN   -Pulm Dr Villatoro

## 2024-04-15 NOTE — PROGRESS NOTE ADULT - SUBJECTIVE AND OBJECTIVE BOX
HPI:  73-year-old female from home ambulates with a walker at baseline with PMH of HTN, COPD ( not on O2 at home), current smoker with 60 ppy hx, hypothyroidism, spinal stenosis urinary incontinence, presenting with left-sided lower back and hip pain after fall 2 weeks ago. Approximately 1-2 weeks ago the patient fell while walking and her leg "gave out" and since then she has had left-sided leg pain.  Patient reports she is able to walk with her cane/walker but feels very weak and unbalanced and has since fallen 2 more times. Pt also complains of SOB. Patient reporting chest pain since the fall today after her cane hit her chest. Patient denies any headache, dizziness, cough, congestion, fever, chills, N,V,D,Abd pain, numbness or tingling. In the ED, pt is comfortable, but complains of pain, NAD, pt is wheezing b/l with R sided crackles, pt was found to be hypoxic on RA to 80s saturating well on 1-2 L NC, hypertensive 175/105, wbc 9, Na 130. CTH and cervical spine shows No gross acute intracranial hemorrhage. No gross acute fracture cervical spine. CTC shows Loss of height of multiple thoracic and lumbar spine vertebral bodies which are of indeterminate age. Dominant cluster of nodular and patchy opacities within the superior segment of the right lower lobe suggestive of infection with endobronchial spread. Emphysema. Intrahepatic and extrahepatic biliary ductal dilatation is of unclear etiology. CT pelvis shows Acute proximal left femoral fracture,  Age-indeterminate severe compression fracture of the L5 vertebral body. Pt is admitted for L hip fracture/ SOB 2/2 PNA vs COPD exacerbation.    Off note: pt states that she lost about 30 lbs in the last 2 months. She is undergoing colonoscopy o/p to evaluate for weight loss. pt denies any blood in the stool. However, she has family history of colon ca in her father.  (09 Apr 2024 19:22)     Pt is seen and examined  pt is awake and lying in bed/out of bed to chair  pt seems comfortable and denies any complaints at this time    ROS:  Negative except for:    MEDICATIONS  (STANDING):  albuterol/ipratropium for Nebulization 3 milliLiter(s) Nebulizer every 6 hours  budesonide 160 MICROgram(s)/formoterol 4.5 MICROgram(s) Inhaler 2 Puff(s) Inhalation two times a day  chlorhexidine 2% Cloths 1 Application(s) Topical <User Schedule>  gabapentin 100 milliGRAM(s) Oral every 8 hours  heparin   Injectable 5000 Unit(s) SubCutaneous every 8 hours  levothyroxine 112 MICROGram(s) Oral daily  lidocaine   4% Patch 2 Patch Transdermal daily  losartan 50 milliGRAM(s) Oral daily  morphine ER Tablet 15 milliGRAM(s) Oral every 12 hours  naloxone Injectable 0.4 milliGRAM(s) IV Push once  nicotine -   7 mG/24Hr(s) Patch 1 Patch Transdermal daily  piperacillin/tazobactam IVPB.. 3.375 Gram(s) IV Intermittent every 8 hours  polyethylene glycol 3350 17 Gram(s) Oral daily  senna 2 Tablet(s) Oral at bedtime    MEDICATIONS  (PRN):  acetaminophen     Tablet .. 1000 milliGRAM(s) Oral every 8 hours PRN Mild Pain (1 - 3)  albuterol    90 MICROgram(s) HFA Inhaler 2 Puff(s) Inhalation every 6 hours PRN Bronchospasm  ALPRAZolam 1 milliGRAM(s) Oral every 12 hours PRN Anxiety  benzonatate 100 milliGRAM(s) Oral every 8 hours PRN Cough  bisacodyl 5 milliGRAM(s) Oral daily PRN Constipation  cyclobenzaprine 5 milliGRAM(s) Oral every 8 hours PRN Spasm  oxyCODONE    IR 5 milliGRAM(s) Oral every 4 hours PRN Moderate Pain (4 - 6)  oxyCODONE    IR 10 milliGRAM(s) Oral every 4 hours PRN Severe Pain (7 - 10)      Allergies    contrast media (iodine-based) (Unknown)    Intolerances        Vital Signs Last 24 Hrs  T(C): 36.4 (15 Apr 2024 05:05), Max: 37.3 (14 Apr 2024 20:51)  T(F): 97.5 (15 Apr 2024 05:05), Max: 99.1 (14 Apr 2024 20:51)  HR: 85 (15 Apr 2024 05:05) (85 - 97)  BP: 142/89 (15 Apr 2024 05:05) (137/73 - 159/92)  BP(mean): --  RR: 18 (15 Apr 2024 05:05) (18 - 18)  SpO2: 95% (15 Apr 2024 05:05) (91% - 95%)    Parameters below as of 15 Apr 2024 05:05  Patient On (Oxygen Delivery Method): nasal cannula  O2 Flow (L/min): 2      PHYSICAL EXAM  General: adult in NAD  HEENT: clear oropharynx, anicteric sclera, pink conjunctiva  Neck: supple  CV: normal S1/S2 with no murmur rubs or gallops  Lungs: positive air movement b/l ant lungs,clear to auscultation, no wheezes, no rales  Abdomen: soft non-tender non-distended, no hepatosplenomegaly  Ext: no clubbing cyanosis or edema  Skin: no rashes and no petechiae  Neuro: alert and oriented X 4, no focal deficits  LABS:                          12.1   10.01 )-----------( 282      ( 15 Apr 2024 07:00 )             37.0         Mean Cell Volume : 96.1 fl  Mean Cell Hemoglobin : 31.4 pg  Mean Cell Hemoglobin Concentration : 32.7 gm/dL  Auto Neutrophil # : 6.89 K/uL  Auto Lymphocyte # : 1.62 K/uL  Auto Monocyte # : 1.10 K/uL  Auto Eosinophil # : 0.32 K/uL  Auto Basophil # : 0.03 K/uL  Auto Neutrophil % : 68.8 %  Auto Lymphocyte % : 16.2 %  Auto Monocyte % : 11.0 %  Auto Eosinophil % : 3.2 %  Auto Basophil % : 0.3 %    Serial CBC  Hematocrit 37.0  Hemoglobin 12.1  Plat 282  RBC 3.85  WBC 10.01  Serial CBC  Hematocrit 37.7  Hemoglobin 12.6  Plat 233  RBC 4.01  WBC 10.15  Serial CBC  Hematocrit 39.8  Hemoglobin 13.4  Plat 227  RBC 4.25  WBC 12.28  Serial CBC  Hematocrit 34.9  Hemoglobin 11.6  Plat 188  RBC 3.66  WBC 8.42    04-15    136  |  97  |  12  ----------------------------<  115<H>  3.5   |  32<H>  |  0.44<L>    Ca    8.6      15 Apr 2024 07:00  Phos  3.4     04-15  Mg     2.5     04-15    TPro  6.5  /  Alb  2.7<L>  /  TBili  0.5  /  DBili  x   /  AST  16  /  ALT  19  /  AlkPhos  113  04-15          Vitamin B12, Serum: 1753 pg/mL (04-11 @ 06:56)  Folate, Serum: 9.4 ng/mL (04-11 @ 06:56)      Serum Protein Electrophoresis Interp: Normal Electrophoresis Pattern (04-11 @ 06:56)  Immunofixation, Serum:   No Monoclonal Band Identified      Reference Range: None Detected (04-11 @ 06:56)        BLOOD SMEAR INTERPRETATION:       RADIOLOGY & ADDITIONAL STUDIES:

## 2024-04-15 NOTE — PROGRESS NOTE ADULT - ASSESSMENT
Confidential Drug Utilization Report  Search Terms: ROXY VASQUEZ, 1950   Search Date: 04/13/2024 16:10:57 PM  The Drug Utilization Report below displays all of the controlled substance prescriptions, if any, that your patient has filled in the last twelve months. The information displayed on this report is compiled from pharmacy submissions to the Department, and accurately reflects the information as submitted by the pharmacies.    This report was requested by: Olya White | Reference #: [id number]    You have not added a TIM number. Keeping your TIM number(s) up to date on the My TIM # tab will enable the separation of your prescriptions from others in the search results.    Practitioner Count: 2  Pharmacy Count: 1  Current Opioid Prescriptions: 2  Current Benzodiazepine Prescriptions: 0  Current Stimulant Prescriptions: 0      Patient Demographic Information (PDI)       PDI	First Name	Last Name	Birth Date	Gender	Street Address	[facility]	Zip Code  OSVALDO Vasquez	1950	Female	[phone]	[facility]	[address]    Prescription Information      PDI Filter:    PDI	Current Rx	Drug Type	Rx Written	Rx Dispensed	Drug	Quantity	Days Supply	Prescriber Name	Prescriber TIM #  A	Y	O	03/27/2024	03/30/2024	oxycodone-acetaminophen  mg tab	150	30	SheRanjith silver	[id number]  Payment Method Cash  Dispenser [facility]  A	Y	O	03/27/2024	03/28/2024	morphine sulf er 60 mg tablet	60	30	SheflRanjith sebastian	[id number]  Payment Method Cash  Dispenser [facility]  A	N	B	03/27/2024	03/27/2024	alprazolam 1 mg tablet	60	15	SheRanjith silver	[id number]  Payment Method Insurance  Dispenser [facility]  A	N	O	02/26/2024	03/01/2024	oxycodone-acetaminophen  mg tablet	150	30	SheRanjith silver	[id number]  Payment Method Abdi  Dispenser [facility]  A	N	B	02/26/2024	02/28/2024	alprazolam 1 mg tablet	60	15	SheRanjith silver	[id number]  Payment Method Insurance  Dispenser [facility]  A	N	O	02/26/2024	02/28/2024	morphine sulf er 60 mg tablet	60	30	SheflRanjith sebastian	[id number]  [facility] Method Cash  [facility]  A	N	B	01/29/2024	02/01/2024	alprazolam 1 mg tablet	60	30	SheflRanjith sebastian	[id number]  Payment Method Insurance  [facility]  A	N	O	01/29/2024	02/01/2024	oxycodone-acetaminophen  mg tab	150	30	SheflinRanjith	[id number]  Payment Method Cash  [facility]  A	N	B	01/02/2024	01/02/2024	alprazolam 1 mg tablet	60	30	Sheflin, Ranjith CASIANO	[id number]  Payment Method Insurance  [facility]  A	N	O	01/02/2024	01/02/2024	oxycodone-acetaminophen  mg tab	150	30	Sheflin, Ranjith CASIANO	[id number]  Payment Method Cash  [facility]  A	N	S	11/27/2023	11/29/2023	dextroamp-amphetamin 20 mg tab	60	30	SheflinRanjith	[id number]  [facility]  [facility]	N	O	11/27/2023	11/29/2023	morphine sulf er 60 mg tablet	60	30	Sheflin, Ranjith CASIANO	[id number]  Payment Method Cash  [facility]  A	N	O	11/27/2023	11/29/2023	oxycodone-acetaminophen  mg tab	150	30	SheflinRanjith	[id number]  Payment Method Insurance  [facility]  A	N	B	11/08/2023	11/27/2023	alprazolam 0.25 mg tablet	2	1	Benny Lundberg B, DO	[id number]  Payment Method Insurance  [facility]  A	N	B	11/27/2023	11/27/2023	alprazolam 1 mg tablet	60	15	SheflinRanjith	[id number]  Payment Method Insurance  [facility]  A	N	B	10/30/2023	10/31/2023	alprazolam 1 mg tablet	60	15	SheflinRanjith	[id number]  Payment Method Insurance  [facility]  A	N	S	10/30/2023	10/31/2023	dextroamp-amphetamin 20 mg tab	60	30	Sheflin, Ranjith CASIANO	[id number]  Payment Method Insurance  Dispenser [facility]  A	N	O	10/30/2023	10/31/2023	morphine sulf er 60 mg tablet	60	30	Sheflin, Ranjith CASIANO	[id number]  Payment Method Cash  [facility]  A	N	O	10/30/2023	10/31/2023	oxycodone-acetaminophen  mg tab	150	30	Sheflin, Ranjith CASIANO	[id number]  Payment Method Insurance  [facility]  A	N	B	10/02/2023	10/02/2023	alprazolam 1 mg tablet	60	15	SheflinRanjith	[id number]  Payment Method Insurance  [facility]  A	N	O	10/02/2023	10/02/2023	oxycodone-acetaminophen  mg tab	150	30	SheflRanjith sebastian	[id number]  Payment Method Insurance  [facility]  A	N	O	09/01/2023	09/30/2023	oxycodone-acetaminophen  mg tab	150	30	SheflinRanjith	[id number]  Payment Method Insurance  [facility]  A	N	B	09/01/2023	09/30/2023	alprazolam 1 mg tablet	60	15	Sheflin, Ranjith CASIANO	[id number]  Payment Method Insurance  [facility]  A	N	O	09/01/2023	09/30/2023	morphine sulf er 60 mg tablet	60	30	Sheflin, Ranjith CASIANO	[id number]  Payment Method Cash  [facility]  A	N	S	09/01/2023	09/30/2023	dextroamp-amphetamin 20 mg tab	60	30	SheflinRanjith	[id number]  Payment Method Insurance  [facility]  A	N	B	07/31/2023	08/04/2023	alprazolam 1 mg tablet	60	15	SheflinRanjith	[id number]  Payment Method Insurance  [facility]  A	N	O	07/31/2023	08/04/2023	oxycodone-acetaminophen  mg tab	150	30	Sheflin, Ranjith	[id number]  Payment Method Insurance  Dispens[facility]  A	N	S	07/31/2023	08/01/2023	dextroamp-amphetamin 20 mg tab	60	30	Sheflin, Ranjith	[id number]  Payment Method Insurance  [facility]  A	N	O	07/31/2023	08/01/2023	morphine sulf er 60 mg tablet	60	30	Sheflin, Ranjith	[id number]  Payment Method Abdi  [facility]  A	N	B	07/01/2023	07/05/2023	alprazolam 1 mg tablet	60	15	Sheflin, Ranjith CASIANO	[id number]  Payment Method Insurance  [facility]  A	N	O	07/01/2023	07/05/2023	oxycodone-acetaminophen  mg tab	150	30	Sheflin, Ranjith CASIANO	[id number]  Payment Method Insurance  [facility]  A	N	S	05/05/2023	06/05/2023	dextroamp-amphetamin 20 mg tab	60	30	Sheflin, Ranjith	[id number]  Payment Method Insurance  [facility]  A	N	B	06/05/2023	06/05/2023	alprazolam 1 mg tablet	60	30	Sheflin, Ranjith	[id number]  Payment Method Insurance  [facility]  A	N	O	06/05/2023	06/05/2023	oxycodone-acetaminophen  mg tab	150	30	Sheflin, Ranjith	[id number]  Payment Method Insurance  [facility]  A	N	S	04/05/2023	05/05/2023	dextroamp-amphetamin 20 mg tab	60	30	Sheflin, Ranjith	[id number]  Payment Method Insurance  [facility]  A	N	B	05/05/2023	05/05/2023	alprazolam 1 mg tablet	60	15	Sheflin, Ranjith	[id number]  Payment Method Insurance  [facility]  A	N	O	05/05/2023	05/05/2023	oxycodone-acetaminophen  mg tab	150	30	Sheflin, Ranjith CASIANO	[id number]  Payment Method Insurance  [facility]    * - Details of Drug Type : O = Opioid, B = Benzodiazepine, S = Stimulant    * - Drugs marked with an asterisk are compound drugs. If the compound drug is made up of more than one controlled substance, then each controlled substance will be a separate row in the table.

## 2024-04-15 NOTE — PROGRESS NOTE ADULT - PROBLEM SELECTOR PLAN 4
-complains of slurred speech on admission, now resolved   -imaging negative for acute findings  -MR head and spine with no acute findings   -Neuro Dr Keyes followed

## 2024-04-15 NOTE — PROGRESS NOTE ADULT - PROBLEM SELECTOR PLAN 5
-pt states that she lost about 30 lbs in the last 2 months she is undergoing colonoscopy o/p to evaluate for weight loss, family history of colon ca in her father  -tumor markers neg  -CT chest showed: Dominant cluster of nodular and patchy opacities within the superior segment of the right lower lobe suggestive of infection with endobronchial spread  -Hem/onc Dr Emerson following - Likely PET/CT outpatient, No other planned oncologic workup inpatient at this time

## 2024-04-15 NOTE — PROGRESS NOTE ADULT - SUBJECTIVE AND OBJECTIVE BOX
PATIENT SEEN AND EXAMINED BY SYD TRIPATHI M.D. ON :- 4/15/24  DATE OF SERVICE:   4/15/24          Interim events noted,Labs ,Radiological studies and Cardiology tests reviewed .    Patient is a 73y old  Female who presents with a chief complaint of L hip fracture (15 Apr 2024 14:48)      HPI:  73-year-old female from home ambulates with a walker at baseline with PMH of HTN, COPD ( not on O2 at home), current smoker with 60 ppy hx, hypothyroidism, spinal stenosis urinary incontinence, presenting with left-sided lower back and hip pain after fall 2 weeks ago. Approximately 1-2 weeks ago the patient fell while walking and her leg "gave out" and since then she has had left-sided leg pain.  Patient reports she is able to walk with her cane/walker but feels very weak and unbalanced and has since fallen 2 more times. Pt also complains of SOB. Patient reporting chest pain since the fall today after her cane hit her chest. Patient denies any headache, dizziness, cough, congestion, fever, chills, N,V,D,Abd pain, numbness or tingling. In the ED, pt is comfortable, but complains of pain, NAD, pt is wheezing b/l with R sided crackles, pt was found to be hypoxic on RA to 80s saturating well on 1-2 L NC, hypertensive 175/105, wbc 9, Na 130. CTH and cervical spine shows No gross acute intracranial hemorrhage. No gross acute fracture cervical spine. CTC shows Loss of height of multiple thoracic and lumbar spine vertebral bodies which are of indeterminate age. Dominant cluster of nodular and patchy opacities within the superior segment of the right lower lobe suggestive of infection with endobronchial spread. Emphysema. Intrahepatic and extrahepatic biliary ductal dilatation is of unclear etiology. CT pelvis shows Acute proximal left femoral fracture,  Age-indeterminate severe compression fracture of the L5 vertebral body. Pt is admitted for L hip fracture/ SOB 2/2 PNA vs COPD exacerbation.    Off note: pt states that she lost about 30 lbs in the last 2 months. She is undergoing colonoscopy o/p to evaluate for weight loss. pt denies any blood in the stool. However, she has family history of colon ca in her father.  (09 Apr 2024 19:22)      PAST MEDICAL & SURGICAL HISTORY:  HTN (hypertension)      Hypothyroidism      COPD (chronic obstructive pulmonary disease)      Spinal stenosis      Current smoker      H/O foot surgery          PREVIOUS DIAGNOSTIC TESTING:      ECHO  FINDINGS:    STRESS  FINDINGS:    CATHETERIZATION  FINDINGS:    MEDICATIONS  (STANDING):  albuterol/ipratropium for Nebulization 3 milliLiter(s) Nebulizer every 6 hours  budesonide 160 MICROgram(s)/formoterol 4.5 MICROgram(s) Inhaler 2 Puff(s) Inhalation two times a day  chlorhexidine 2% Cloths 1 Application(s) Topical <User Schedule>  gabapentin 100 milliGRAM(s) Oral every 8 hours  heparin   Injectable 5000 Unit(s) SubCutaneous every 8 hours  levothyroxine 112 MICROGram(s) Oral daily  lidocaine   4% Patch 2 Patch Transdermal daily  losartan 50 milliGRAM(s) Oral daily  morphine ER Tablet 15 milliGRAM(s) Oral every 12 hours  naloxone Injectable 0.4 milliGRAM(s) IV Push once  nicotine -   7 mG/24Hr(s) Patch 1 Patch Transdermal daily  piperacillin/tazobactam IVPB.. 3.375 Gram(s) IV Intermittent every 8 hours  polyethylene glycol 3350 17 Gram(s) Oral daily  predniSONE   Tablet 20 milliGRAM(s) Oral daily  senna 2 Tablet(s) Oral at bedtime    MEDICATIONS  (PRN):  acetaminophen     Tablet .. 1000 milliGRAM(s) Oral every 8 hours PRN Mild Pain (1 - 3)  albuterol    90 MICROgram(s) HFA Inhaler 2 Puff(s) Inhalation every 6 hours PRN Bronchospasm  ALPRAZolam 1 milliGRAM(s) Oral every 12 hours PRN Anxiety  benzonatate 100 milliGRAM(s) Oral every 8 hours PRN Cough  bisacodyl 5 milliGRAM(s) Oral daily PRN Constipation  cyclobenzaprine 5 milliGRAM(s) Oral every 8 hours PRN Spasm  oxyCODONE    IR 5 milliGRAM(s) Oral every 4 hours PRN Moderate Pain (4 - 6)  oxyCODONE    IR 10 milliGRAM(s) Oral every 4 hours PRN Severe Pain (7 - 10)      FAMILY HISTORY:  FH: colon cancer (Father)        SOCIAL HISTORY:    CIGARETTES:    ALCOHOL:    REVIEW OF SYSTEMS:  CONSTITUTIONAL: No fever, weight loss, or fatigue  EYES: No eye pain, visual disturbances, or discharge  ENMT:  No difficulty hearing, tinnitus, vertigo; No sinus or throat pain  NECK: No pain or stiffness  RESPIRATORY: No cough, wheezing, chills or hemoptysis; No shortness of breath  CARDIOVASCULAR: No chest pain, palpitations, dizziness, or leg swelling  GASTROINTESTINAL: No abdominal or epigastric pain. No nausea, vomiting, or hematemesis; No diarrhea or constipation. No melena or hematochezia.  GENITOURINARY: No dysuria, frequency, hematuria, or incontinence  NEUROLOGICAL: No headaches, memory loss, loss of strength, numbness, or tremors  SKIN: No itching, burning, rashes, or lesions   LYMPH NODES: No enlarged glands  ENDOCRINE: No heat or cold intolerance; No hair loss  MUSCULOSKELETAL: No joint pain or swelling; No muscle, back, or extremity pain  PSYCHIATRIC: No depression, anxiety, mood swings, or difficulty sleeping  HEME/LYMPH: No easy bruising, or bleeding gums  ALLERY AND IMMUNOLOGIC: No hives or eczema    Vital Signs Last 24 Hrs  T(C): 36.4 (15 Apr 2024 20:55), Max: 37.1 (15 Apr 2024 13:10)  T(F): 97.6 (15 Apr 2024 20:55), Max: 98.8 (15 Apr 2024 13:10)  HR: 90 (15 Apr 2024 20:55) (85 - 90)  BP: 165/90 (15 Apr 2024 20:55) (142/89 - 165/90)  BP(mean): --  RR: 18 (15 Apr 2024 20:55) (18 - 18)  SpO2: 93% (15 Apr 2024 20:55) (93% - 95%)    Parameters below as of 15 Apr 2024 20:55  Patient On (Oxygen Delivery Method): nasal cannula  O2 Flow (L/min): 2        PHYSICAL EXAM:  GENERAL: NAD, well-groomed, well-developed  HEAD:  Atraumatic, Normocephalic  EYES: EOMI, PERRLA, conjunctiva and sclera clear  ENMT: No tonsillar erythema, exudates, or enlargement; Moist mucous membranes, Good dentition, No lesions  NECK: Supple, No JVD, Normal thyroid  NERVOUS SYSTEM:  Alert & Oriented X3, Good concentration; Motor Strength 5/5 B/L upper and lower extremities; DTRs 2+ intact and symmetric  CHEST/LUNG: Clear to percussion bilaterally; No rales, rhonchi, wheezing, or rubs  HEART: Regular rate and rhythm; No murmurs, rubs, or gallops  ABDOMEN: Soft, Nontender, Nondistended; Bowel sounds present  EXTREMITIES:  2+ Peripheral Pulses, No clubbing, cyanosis, or edema  LYMPH: No lymphadenopathy noted  SKIN: No rashes or lesions      INTERPRETATION OF TELEMETRY:    ECG:    DEIRDRE:     LABS:                        12.1   10.01 )-----------( 282      ( 15 Apr 2024 07:00 )             37.0     04-15    136  |  97  |  12  ----------------------------<  115<H>  3.5   |  32<H>  |  0.44<L>    Ca    8.6      15 Apr 2024 07:00  Phos  3.4     04-15  Mg     2.5     04-15    TPro  6.5  /  Alb  2.7<L>  /  TBili  0.5  /  DBili  x   /  AST  16  /  ALT  19  /  AlkPhos  113  04-15          Urinalysis Basic - ( 15 Apr 2024 07:00 )    Color: x / Appearance: x / SG: x / pH: x  Gluc: 115 mg/dL / Ketone: x  / Bili: x / Urobili: x   Blood: x / Protein: x / Nitrite: x   Leuk Esterase: x / RBC: x / WBC x   Sq Epi: x / Non Sq Epi: x / Bacteria: x      Lipid Panel:   I&O's Summary    14 Apr 2024 07:01  -  15 Apr 2024 07:00  --------------------------------------------------------  IN: 0 mL / OUT: 1600 mL / NET: -1600 mL    15 Apr 2024 07:01  -  15 Apr 2024 22:21  --------------------------------------------------------  IN: 0 mL / OUT: 1100 mL / NET: -1100 mL        RADIOLOGY & ADDITIONAL STUDIES:

## 2024-04-15 NOTE — PROGRESS NOTE ADULT - PROBLEM SELECTOR PLAN 8
[facility] -hx of spinal stenosis takes morphine and percocet at home  -cont  pain regimen  -Pain mgmt following

## 2024-04-15 NOTE — PROGRESS NOTE ADULT - PROBLEM SELECTOR PLAN 9
-pt is current smoker, smokes 3 cigarettes a day, with hx of 60 + ppy  -cont nicotine patches  -cessation counseling

## 2024-04-15 NOTE — PROGRESS NOTE ADULT - SUBJECTIVE AND OBJECTIVE BOX
Patient is a 73y old  Female who presents with a chief complaint of L hip fracture (15 Apr 2024 14:20)    OVERNIGHT EVENTS: pt reports feeling weak, undecided about the surgery     REVIEW OF SYSTEMS:  CONSTITUTIONAL: +ve weakness   RESPIRATORY: No cough, SOB  CARDIOVASCULAR: No chest pain, palpitations  GASTROINTESTINAL: No abdominal pain  GENITOURINARY: No dysuria  NEUROLOGICAL: No HA  SKIN: No itching, burning, rashes, or lesions   MUSCULOSKELETAL: +ve left hip pain     T(C): 37.1 (04-15-24 @ 13:10), Max: 37.3 (04-14-24 @ 20:51)  HR: 87 (04-15-24 @ 13:10) (85 - 97)  BP: 152/96 (04-15-24 @ 13:10) (137/73 - 152/96)  RR: 18 (04-15-24 @ 13:10) (18 - 18)  SpO2: 95% (04-15-24 @ 13:10) (91% - 95%)  Wt(kg): --Vital Signs Last 24 Hrs  T(C): 37.1 (15 Apr 2024 13:10), Max: 37.3 (14 Apr 2024 20:51)  T(F): 98.8 (15 Apr 2024 13:10), Max: 99.1 (14 Apr 2024 20:51)  HR: 87 (15 Apr 2024 13:10) (85 - 97)  BP: 152/96 (15 Apr 2024 13:10) (137/73 - 152/96)  BP(mean): --  RR: 18 (15 Apr 2024 13:10) (18 - 18)  SpO2: 95% (15 Apr 2024 13:10) (91% - 95%)    Parameters below as of 15 Apr 2024 13:10  Patient On (Oxygen Delivery Method): nasal cannula  O2 Flow (L/min): 2    MEDICATIONS  (STANDING):  albuterol/ipratropium for Nebulization 3 milliLiter(s) Nebulizer every 6 hours  budesonide 160 MICROgram(s)/formoterol 4.5 MICROgram(s) Inhaler 2 Puff(s) Inhalation two times a day  chlorhexidine 2% Cloths 1 Application(s) Topical <User Schedule>  gabapentin 100 milliGRAM(s) Oral every 8 hours  heparin   Injectable 5000 Unit(s) SubCutaneous every 8 hours  levothyroxine 112 MICROGram(s) Oral daily  lidocaine   4% Patch 2 Patch Transdermal daily  losartan 50 milliGRAM(s) Oral daily  morphine ER Tablet 15 milliGRAM(s) Oral every 12 hours  naloxone Injectable 0.4 milliGRAM(s) IV Push once  nicotine -   7 mG/24Hr(s) Patch 1 Patch Transdermal daily  piperacillin/tazobactam IVPB.. 3.375 Gram(s) IV Intermittent every 8 hours  polyethylene glycol 3350 17 Gram(s) Oral daily  senna 2 Tablet(s) Oral at bedtime    MEDICATIONS  (PRN):  acetaminophen     Tablet .. 1000 milliGRAM(s) Oral every 8 hours PRN Mild Pain (1 - 3)  albuterol    90 MICROgram(s) HFA Inhaler 2 Puff(s) Inhalation every 6 hours PRN Bronchospasm  ALPRAZolam 1 milliGRAM(s) Oral every 12 hours PRN Anxiety  benzonatate 100 milliGRAM(s) Oral every 8 hours PRN Cough  bisacodyl 5 milliGRAM(s) Oral daily PRN Constipation  cyclobenzaprine 5 milliGRAM(s) Oral every 8 hours PRN Spasm  oxyCODONE    IR 5 milliGRAM(s) Oral every 4 hours PRN Moderate Pain (4 - 6)  oxyCODONE    IR 10 milliGRAM(s) Oral every 4 hours PRN Severe Pain (7 - 10)    PHYSICAL EXAM:  GENERAL: NAD  EYES: clear conjunctiva  ENMT: Moist mucous membranes  NECK: Supple, No JVD  CHEST/LUNG: Clear to auscultation bilaterally; No rales, rhonchi, wheezing, or rubs  HEART: S1, S2, Regular rate and rhythm  ABDOMEN: Soft, Nontender, Nondistended; Bowel sounds present  NEURO: Alert & Oriented X3  EXTREMITIES: No LE edema, nleft leg with limited ROM due to pain   SKIN: No rashes or lesions    Consultant(s) Notes Reviewed:  [x ] YES  [ ] NO  Care Discussed with Consultants/Other Providers [ x] YES  [ ] NO    LABS:                        12.1   10.01 )-----------( 282      ( 15 Apr 2024 07:00 )             37.0     04-15    136  |  97  |  12  ----------------------------<  115<H>  3.5   |  32<H>  |  0.44<L>    Ca    8.6      15 Apr 2024 07:00  Phos  3.4     04-15  Mg     2.5     04-15    TPro  6.5  /  Alb  2.7<L>  /  TBili  0.5  /  DBili  x   /  AST  16  /  ALT  19  /  AlkPhos  113  04-15    CAPILLARY BLOOD GLUCOSE  Urinalysis Basic - ( 15 Apr 2024 07:00 )  Color: x / Appearance: x / SG: x / pH: x  Gluc: 115 mg/dL / Ketone: x  / Bili: x / Urobili: x   Blood: x / Protein: x / Nitrite: x   Leuk Esterase: x / RBC: x / WBC x   Sq Epi: x / Non Sq Epi: x / Bacteria: x    RADIOLOGY & ADDITIONAL TESTS:  < from: MR Thoracic Spine No Cont (04.11.24 @ 19:15) >    IMPRESSION:    1. BRAIN:   Basal ganglia lacunar infarctions. Ischemic white matter   disease and atrophy typical for age    2. CERVICAL SPINE:   Grade 1 anterolisthesis C4 on C5. Low-grade cervical   degenerative disc disease. Cervical cord appears intact.    3. THORACIC SPINE:   Exaggeration in mid thoracic kyphosis with multiple   thoracic endplate deformities that are age indeterminate but felt likely   to be long-standing. Thoracic cord appears intact.      < end of copied text >  < from: CT Pelvis Bony Only No Cont (04.09.24 @ 17:07) >    ACC: [id number] EXAM:  CT PELVIS BONY ONLY   ORDERED BY: SAMEER KLEIN     PROCEDURE DATE:  04/09/2024          INTERPRETATION:  INDICATION:  left hip pain s/p fall    TECHNIQUE: CT of the pelvis without contrast with axial, sagittal, and   coronal multiplanar reformats.    Comparison:None available    FINDINGS:    Bones are diffusely demineralized.    Acute displaced, slightly comminuted fracture of the intertrochanteric   region of the proximal left femur with extension into the basicervical   and transcervical region of the left femoral neck superolaterally. Left   femoral head remains seated within the acetabulum. Bilateral hip joint   spaces are relatively preserved.    Age-indeterminate severe compression fracture of L5.    Degenerative changes within the visualized lower lumbar spine and   sacroiliac joints.    Soft tissue swelling/hematoma about the left hip fracture site. Stool   throughout the colon. Atherosclerotic calcifications are noted.    IMPRESSION:    Acute proximal left femoral fracture, as above.    Age-indeterminate severe compression fracture of the L5 vertebral body.    < end of copied text >

## 2024-04-16 LAB
ACRM BINDING ANTIBODY: 1.72 NMOL/L — HIGH (ref 0–0.24)
ALBUMIN SERPL ELPH-MCNC: 2.9 G/DL — LOW (ref 3.5–5)
ALP SERPL-CCNC: 121 U/L — HIGH (ref 40–120)
ALT FLD-CCNC: 18 U/L DA — SIGNIFICANT CHANGE UP (ref 10–60)
ANION GAP SERPL CALC-SCNC: 6 MMOL/L — SIGNIFICANT CHANGE UP (ref 5–17)
ANION GAP SERPL CALC-SCNC: 7 MMOL/L — SIGNIFICANT CHANGE UP (ref 5–17)
AST SERPL-CCNC: 13 U/L — SIGNIFICANT CHANGE UP (ref 10–40)
BASOPHILS # BLD AUTO: 0.02 K/UL — SIGNIFICANT CHANGE UP (ref 0–0.2)
BASOPHILS # BLD AUTO: 0.04 K/UL — SIGNIFICANT CHANGE UP (ref 0–0.2)
BASOPHILS NFR BLD AUTO: 0.2 % — SIGNIFICANT CHANGE UP (ref 0–2)
BASOPHILS NFR BLD AUTO: 0.5 % — SIGNIFICANT CHANGE UP (ref 0–2)
BILIRUB SERPL-MCNC: 0.4 MG/DL — SIGNIFICANT CHANGE UP (ref 0.2–1.2)
BUN SERPL-MCNC: 12 MG/DL — SIGNIFICANT CHANGE UP (ref 7–18)
BUN SERPL-MCNC: 13 MG/DL — SIGNIFICANT CHANGE UP (ref 7–18)
CALCIUM SERPL-MCNC: 8.2 MG/DL — LOW (ref 8.4–10.5)
CALCIUM SERPL-MCNC: 8.8 MG/DL — SIGNIFICANT CHANGE UP (ref 8.4–10.5)
CCP IGG SERPL-ACNC: <8 UNITS — SIGNIFICANT CHANGE UP
CHLORIDE SERPL-SCNC: 101 MMOL/L — SIGNIFICANT CHANGE UP (ref 96–108)
CHLORIDE SERPL-SCNC: 98 MMOL/L — SIGNIFICANT CHANGE UP (ref 96–108)
CO2 SERPL-SCNC: 30 MMOL/L — SIGNIFICANT CHANGE UP (ref 22–31)
CO2 SERPL-SCNC: 32 MMOL/L — HIGH (ref 22–31)
CREAT SERPL-MCNC: 0.44 MG/DL — LOW (ref 0.5–1.3)
CREAT SERPL-MCNC: 0.65 MG/DL — SIGNIFICANT CHANGE UP (ref 0.5–1.3)
EGFR: 101 ML/MIN/1.73M2 — SIGNIFICANT CHANGE UP
EGFR: 92 ML/MIN/1.73M2 — SIGNIFICANT CHANGE UP
EOSINOPHIL # BLD AUTO: 0.04 K/UL — SIGNIFICANT CHANGE UP (ref 0–0.5)
EOSINOPHIL # BLD AUTO: 0.15 K/UL — SIGNIFICANT CHANGE UP (ref 0–0.5)
EOSINOPHIL NFR BLD AUTO: 0.4 % — SIGNIFICANT CHANGE UP (ref 0–6)
EOSINOPHIL NFR BLD AUTO: 1.7 % — SIGNIFICANT CHANGE UP (ref 0–6)
GLUCOSE SERPL-MCNC: 109 MG/DL — HIGH (ref 70–99)
GLUCOSE SERPL-MCNC: 113 MG/DL — HIGH (ref 70–99)
HADV DNA SPEC QL NAA+PROBE: DETECTED
HCT VFR BLD CALC: 37.2 % — SIGNIFICANT CHANGE UP (ref 34.5–45)
HCT VFR BLD CALC: 37.8 % — SIGNIFICANT CHANGE UP (ref 34.5–45)
HGB BLD-MCNC: 11.9 G/DL — SIGNIFICANT CHANGE UP (ref 11.5–15.5)
HGB BLD-MCNC: 12.4 G/DL — SIGNIFICANT CHANGE UP (ref 11.5–15.5)
HOMOCYSTEINE LEVEL: 11.2 UMOL/L — SIGNIFICANT CHANGE UP (ref 0–19.2)
IMM GRANULOCYTES NFR BLD AUTO: 0.3 % — SIGNIFICANT CHANGE UP (ref 0–0.9)
IMM GRANULOCYTES NFR BLD AUTO: 0.7 % — SIGNIFICANT CHANGE UP (ref 0–0.9)
LYMPHOCYTES # BLD AUTO: 1.37 K/UL — SIGNIFICANT CHANGE UP (ref 1–3.3)
LYMPHOCYTES # BLD AUTO: 1.68 K/UL — SIGNIFICANT CHANGE UP (ref 1–3.3)
LYMPHOCYTES # BLD AUTO: 15.6 % — SIGNIFICANT CHANGE UP (ref 13–44)
LYMPHOCYTES # BLD AUTO: 18.1 % — SIGNIFICANT CHANGE UP (ref 13–44)
MAGNESIUM SERPL-MCNC: 2.5 MG/DL — SIGNIFICANT CHANGE UP (ref 1.6–2.6)
MCHC RBC-ENTMCNC: 31.5 PG — SIGNIFICANT CHANGE UP (ref 27–34)
MCHC RBC-ENTMCNC: 31.6 PG — SIGNIFICANT CHANGE UP (ref 27–34)
MCHC RBC-ENTMCNC: 32 GM/DL — SIGNIFICANT CHANGE UP (ref 32–36)
MCHC RBC-ENTMCNC: 32.8 GM/DL — SIGNIFICANT CHANGE UP (ref 32–36)
MCV RBC AUTO: 96.2 FL — SIGNIFICANT CHANGE UP (ref 80–100)
MCV RBC AUTO: 98.4 FL — SIGNIFICANT CHANGE UP (ref 80–100)
METHYLMALONATE SERPL-SCNC: 219 NMOL/L — SIGNIFICANT CHANGE UP (ref 0–378)
MONOCYTES # BLD AUTO: 0.95 K/UL — HIGH (ref 0–0.9)
MONOCYTES # BLD AUTO: 1.03 K/UL — HIGH (ref 0–0.9)
MONOCYTES NFR BLD AUTO: 10.2 % — SIGNIFICANT CHANGE UP (ref 2–14)
MONOCYTES NFR BLD AUTO: 11.8 % — SIGNIFICANT CHANGE UP (ref 2–14)
NEUTROPHILS # BLD AUTO: 6.11 K/UL — SIGNIFICANT CHANGE UP (ref 1.8–7.4)
NEUTROPHILS # BLD AUTO: 6.57 K/UL — SIGNIFICANT CHANGE UP (ref 1.8–7.4)
NEUTROPHILS NFR BLD AUTO: 69.7 % — SIGNIFICANT CHANGE UP (ref 43–77)
NEUTROPHILS NFR BLD AUTO: 70.8 % — SIGNIFICANT CHANGE UP (ref 43–77)
NRBC # BLD: 0 /100 WBCS — SIGNIFICANT CHANGE UP (ref 0–0)
NRBC # BLD: 0 /100 WBCS — SIGNIFICANT CHANGE UP (ref 0–0)
PHOSPHATE SERPL-MCNC: 3.2 MG/DL — SIGNIFICANT CHANGE UP (ref 2.5–4.5)
PLATELET # BLD AUTO: 320 K/UL — SIGNIFICANT CHANGE UP (ref 150–400)
PLATELET # BLD AUTO: 346 K/UL — SIGNIFICANT CHANGE UP (ref 150–400)
POTASSIUM SERPL-MCNC: 3.3 MMOL/L — LOW (ref 3.5–5.3)
POTASSIUM SERPL-MCNC: 3.7 MMOL/L — SIGNIFICANT CHANGE UP (ref 3.5–5.3)
POTASSIUM SERPL-SCNC: 3.3 MMOL/L — LOW (ref 3.5–5.3)
POTASSIUM SERPL-SCNC: 3.7 MMOL/L — SIGNIFICANT CHANGE UP (ref 3.5–5.3)
PROCALCITONIN SERPL-MCNC: 0.08 NG/ML — SIGNIFICANT CHANGE UP (ref 0.02–0.1)
PROT SERPL-MCNC: 6.9 G/DL — SIGNIFICANT CHANGE UP (ref 6–8.3)
RAPID RVP RESULT: DETECTED
RBC # BLD: 3.78 M/UL — LOW (ref 3.8–5.2)
RBC # BLD: 3.93 M/UL — SIGNIFICANT CHANGE UP (ref 3.8–5.2)
RBC # FLD: 12.9 % — SIGNIFICANT CHANGE UP (ref 10.3–14.5)
RBC # FLD: 13.2 % — SIGNIFICANT CHANGE UP (ref 10.3–14.5)
RF+CCP IGG SER-IMP: NEGATIVE — SIGNIFICANT CHANGE UP
SARS-COV-2 RNA SPEC QL NAA+PROBE: SIGNIFICANT CHANGE UP
SODIUM SERPL-SCNC: 137 MMOL/L — SIGNIFICANT CHANGE UP (ref 135–145)
SODIUM SERPL-SCNC: 137 MMOL/L — SIGNIFICANT CHANGE UP (ref 135–145)
WBC # BLD: 8.76 K/UL — SIGNIFICANT CHANGE UP (ref 3.8–10.5)
WBC # BLD: 9.29 K/UL — SIGNIFICANT CHANGE UP (ref 3.8–10.5)
WBC # FLD AUTO: 8.76 K/UL — SIGNIFICANT CHANGE UP (ref 3.8–10.5)
WBC # FLD AUTO: 9.29 K/UL — SIGNIFICANT CHANGE UP (ref 3.8–10.5)

## 2024-04-16 PROCEDURE — 99232 SBSQ HOSP IP/OBS MODERATE 35: CPT

## 2024-04-16 DEVICE — GUIDEWIRE BALL TIP 3MM X 1000MM FOR T2 R1.5 FEMORAL NAILING SYSTEM: Type: IMPLANTABLE DEVICE | Site: LEFT | Status: FUNCTIONAL

## 2024-04-16 DEVICE — NAIL INTRAMED TROCHANTER 125 DEG 10X170MM: Type: IMPLANTABLE DEVICE | Site: LEFT | Status: FUNCTIONAL

## 2024-04-16 DEVICE — SCREW GAMMA LAG 10.5X80MM STRL: Type: IMPLANTABLE DEVICE | Site: LEFT | Status: FUNCTIONAL

## 2024-04-16 DEVICE — SCREW LOKG 5X35MM STRL: Type: IMPLANTABLE DEVICE | Site: LEFT | Status: FUNCTIONAL

## 2024-04-16 RX ORDER — SODIUM CHLORIDE 9 MG/ML
1000 INJECTION INTRAMUSCULAR; INTRAVENOUS; SUBCUTANEOUS
Refills: 0 | Status: DISCONTINUED | OUTPATIENT
Start: 2024-04-16 | End: 2024-04-16

## 2024-04-16 RX ORDER — LANOLIN ALCOHOL/MO/W.PET/CERES
5 CREAM (GRAM) TOPICAL ONCE
Refills: 0 | Status: COMPLETED | OUTPATIENT
Start: 2024-04-16 | End: 2024-04-16

## 2024-04-16 RX ORDER — POTASSIUM CHLORIDE 20 MEQ
40 PACKET (EA) ORAL ONCE
Refills: 0 | Status: COMPLETED | OUTPATIENT
Start: 2024-04-16 | End: 2024-04-16

## 2024-04-16 RX ORDER — PIPERACILLIN AND TAZOBACTAM 4; .5 G/20ML; G/20ML
3.38 INJECTION, POWDER, LYOPHILIZED, FOR SOLUTION INTRAVENOUS EVERY 8 HOURS
Refills: 0 | Status: DISCONTINUED | OUTPATIENT
Start: 2024-04-16 | End: 2024-04-18

## 2024-04-16 RX ORDER — LOSARTAN POTASSIUM 100 MG/1
50 TABLET, FILM COATED ORAL DAILY
Refills: 0 | Status: DISCONTINUED | OUTPATIENT
Start: 2024-04-16 | End: 2024-04-19

## 2024-04-16 RX ORDER — ENOXAPARIN SODIUM 100 MG/ML
40 INJECTION SUBCUTANEOUS EVERY 24 HOURS
Refills: 0 | Status: DISCONTINUED | OUTPATIENT
Start: 2024-04-17 | End: 2024-04-19

## 2024-04-16 RX ORDER — OXYCODONE HYDROCHLORIDE 5 MG/1
5 TABLET ORAL EVERY 4 HOURS
Refills: 0 | Status: DISCONTINUED | OUTPATIENT
Start: 2024-04-16 | End: 2024-04-19

## 2024-04-16 RX ORDER — LIDOCAINE 4 G/100G
2 CREAM TOPICAL DAILY
Refills: 0 | Status: DISCONTINUED | OUTPATIENT
Start: 2024-04-16 | End: 2024-04-19

## 2024-04-16 RX ORDER — CEFAZOLIN SODIUM 1 G
2000 VIAL (EA) INJECTION EVERY 8 HOURS
Refills: 0 | Status: COMPLETED | OUTPATIENT
Start: 2024-04-17 | End: 2024-04-17

## 2024-04-16 RX ORDER — OXYCODONE HYDROCHLORIDE 5 MG/1
10 TABLET ORAL EVERY 4 HOURS
Refills: 0 | Status: DISCONTINUED | OUTPATIENT
Start: 2024-04-16 | End: 2024-04-19

## 2024-04-16 RX ORDER — POLYETHYLENE GLYCOL 3350 17 G/17G
17 POWDER, FOR SOLUTION ORAL DAILY
Refills: 0 | Status: DISCONTINUED | OUTPATIENT
Start: 2024-04-16 | End: 2024-04-19

## 2024-04-16 RX ORDER — ONDANSETRON 8 MG/1
4 TABLET, FILM COATED ORAL ONCE
Refills: 0 | Status: DISCONTINUED | OUTPATIENT
Start: 2024-04-16 | End: 2024-04-16

## 2024-04-16 RX ORDER — FLUTICASONE PROPIONATE AND SALMETEROL 50; 250 UG/1; UG/1
1 POWDER ORAL; RESPIRATORY (INHALATION)
Refills: 0 | Status: DISCONTINUED | OUTPATIENT
Start: 2024-04-16 | End: 2024-04-16

## 2024-04-16 RX ORDER — POTASSIUM CHLORIDE 20 MEQ
10 PACKET (EA) ORAL
Refills: 0 | Status: COMPLETED | OUTPATIENT
Start: 2024-04-16 | End: 2024-04-16

## 2024-04-16 RX ORDER — NALOXONE HYDROCHLORIDE 4 MG/.1ML
0.4 SPRAY NASAL ONCE
Refills: 0 | Status: DISCONTINUED | OUTPATIENT
Start: 2024-04-16 | End: 2024-04-19

## 2024-04-16 RX ORDER — HYDROMORPHONE HYDROCHLORIDE 2 MG/ML
0.5 INJECTION INTRAMUSCULAR; INTRAVENOUS; SUBCUTANEOUS
Refills: 0 | Status: DISCONTINUED | OUTPATIENT
Start: 2024-04-16 | End: 2024-04-16

## 2024-04-16 RX ORDER — ALPRAZOLAM 0.25 MG
1 TABLET ORAL EVERY 12 HOURS
Refills: 0 | Status: DISCONTINUED | OUTPATIENT
Start: 2024-04-16 | End: 2024-04-19

## 2024-04-16 RX ORDER — FENTANYL CITRATE 50 UG/ML
25 INJECTION INTRAVENOUS
Refills: 0 | Status: DISCONTINUED | OUTPATIENT
Start: 2024-04-16 | End: 2024-04-16

## 2024-04-16 RX ORDER — SODIUM CHLORIDE 9 MG/ML
1000 INJECTION INTRAMUSCULAR; INTRAVENOUS; SUBCUTANEOUS
Refills: 0 | Status: DISCONTINUED | OUTPATIENT
Start: 2024-04-16 | End: 2024-04-17

## 2024-04-16 RX ORDER — SENNA PLUS 8.6 MG/1
2 TABLET ORAL AT BEDTIME
Refills: 0 | Status: DISCONTINUED | OUTPATIENT
Start: 2024-04-16 | End: 2024-04-19

## 2024-04-16 RX ORDER — MORPHINE SULFATE 50 MG/1
15 CAPSULE, EXTENDED RELEASE ORAL EVERY 12 HOURS
Refills: 0 | Status: DISCONTINUED | OUTPATIENT
Start: 2024-04-16 | End: 2024-04-19

## 2024-04-16 RX ORDER — ACETAMINOPHEN 500 MG
1000 TABLET ORAL EVERY 8 HOURS
Refills: 0 | Status: DISCONTINUED | OUTPATIENT
Start: 2024-04-16 | End: 2024-04-19

## 2024-04-16 RX ORDER — NICOTINE POLACRILEX 2 MG
1 GUM BUCCAL DAILY
Refills: 0 | Status: DISCONTINUED | OUTPATIENT
Start: 2024-04-16 | End: 2024-04-19

## 2024-04-16 RX ORDER — ALBUTEROL 90 UG/1
2 AEROSOL, METERED ORAL EVERY 6 HOURS
Refills: 0 | Status: DISCONTINUED | OUTPATIENT
Start: 2024-04-16 | End: 2024-04-19

## 2024-04-16 RX ORDER — CYCLOBENZAPRINE HYDROCHLORIDE 10 MG/1
5 TABLET, FILM COATED ORAL EVERY 8 HOURS
Refills: 0 | Status: DISCONTINUED | OUTPATIENT
Start: 2024-04-16 | End: 2024-04-19

## 2024-04-16 RX ORDER — LEVOTHYROXINE SODIUM 125 MCG
112 TABLET ORAL DAILY
Refills: 0 | Status: DISCONTINUED | OUTPATIENT
Start: 2024-04-16 | End: 2024-04-19

## 2024-04-16 RX ADMIN — Medication 1 PATCH: at 11:27

## 2024-04-16 RX ADMIN — OXYCODONE HYDROCHLORIDE 5 MILLIGRAM(S): 5 TABLET ORAL at 12:30

## 2024-04-16 RX ADMIN — Medication 5 MILLIGRAM(S): at 05:32

## 2024-04-16 RX ADMIN — Medication 3 MILLILITER(S): at 02:34

## 2024-04-16 RX ADMIN — OXYCODONE HYDROCHLORIDE 10 MILLIGRAM(S): 5 TABLET ORAL at 23:27

## 2024-04-16 RX ADMIN — OXYCODONE HYDROCHLORIDE 10 MILLIGRAM(S): 5 TABLET ORAL at 05:33

## 2024-04-16 RX ADMIN — PIPERACILLIN AND TAZOBACTAM 25 GRAM(S): 4; .5 INJECTION, POWDER, LYOPHILIZED, FOR SOLUTION INTRAVENOUS at 15:08

## 2024-04-16 RX ADMIN — SODIUM CHLORIDE 60 MILLILITER(S): 9 INJECTION INTRAMUSCULAR; INTRAVENOUS; SUBCUTANEOUS at 10:47

## 2024-04-16 RX ADMIN — OXYCODONE HYDROCHLORIDE 10 MILLIGRAM(S): 5 TABLET ORAL at 18:30

## 2024-04-16 RX ADMIN — Medication 3 MILLILITER(S): at 14:07

## 2024-04-16 RX ADMIN — OXYCODONE HYDROCHLORIDE 10 MILLIGRAM(S): 5 TABLET ORAL at 07:00

## 2024-04-16 RX ADMIN — Medication 100 MILLIEQUIVALENT(S): at 10:11

## 2024-04-16 RX ADMIN — Medication 1 MILLIGRAM(S): at 13:00

## 2024-04-16 RX ADMIN — Medication 1 PATCH: at 07:57

## 2024-04-16 RX ADMIN — OXYCODONE HYDROCHLORIDE 10 MILLIGRAM(S): 5 TABLET ORAL at 09:14

## 2024-04-16 RX ADMIN — Medication 1000 MILLIGRAM(S): at 23:02

## 2024-04-16 RX ADMIN — OXYCODONE HYDROCHLORIDE 10 MILLIGRAM(S): 5 TABLET ORAL at 17:49

## 2024-04-16 RX ADMIN — OXYCODONE HYDROCHLORIDE 10 MILLIGRAM(S): 5 TABLET ORAL at 00:20

## 2024-04-16 RX ADMIN — Medication 1 PATCH: at 11:28

## 2024-04-16 RX ADMIN — Medication 112 MICROGRAM(S): at 04:49

## 2024-04-16 RX ADMIN — OXYCODONE HYDROCHLORIDE 10 MILLIGRAM(S): 5 TABLET ORAL at 14:00

## 2024-04-16 RX ADMIN — LIDOCAINE 2 PATCH: 4 CREAM TOPICAL at 23:26

## 2024-04-16 RX ADMIN — Medication 100 MILLIEQUIVALENT(S): at 13:15

## 2024-04-16 RX ADMIN — Medication 5 MILLIGRAM(S): at 03:02

## 2024-04-16 RX ADMIN — CHLORHEXIDINE GLUCONATE 1 APPLICATION(S): 213 SOLUTION TOPICAL at 07:01

## 2024-04-16 RX ADMIN — OXYCODONE HYDROCHLORIDE 10 MILLIGRAM(S): 5 TABLET ORAL at 10:00

## 2024-04-16 RX ADMIN — HEPARIN SODIUM 5000 UNIT(S): 5000 INJECTION INTRAVENOUS; SUBCUTANEOUS at 05:35

## 2024-04-16 RX ADMIN — Medication 100 MILLIEQUIVALENT(S): at 11:28

## 2024-04-16 RX ADMIN — LOSARTAN POTASSIUM 50 MILLIGRAM(S): 100 TABLET, FILM COATED ORAL at 05:32

## 2024-04-16 RX ADMIN — PIPERACILLIN AND TAZOBACTAM 25 GRAM(S): 4; .5 INJECTION, POWDER, LYOPHILIZED, FOR SOLUTION INTRAVENOUS at 05:36

## 2024-04-16 RX ADMIN — Medication 1 PATCH: at 19:59

## 2024-04-16 RX ADMIN — OXYCODONE HYDROCHLORIDE 5 MILLIGRAM(S): 5 TABLET ORAL at 11:57

## 2024-04-16 RX ADMIN — OXYCODONE HYDROCHLORIDE 10 MILLIGRAM(S): 5 TABLET ORAL at 13:00

## 2024-04-16 NOTE — PROGRESS NOTE ADULT - SUBJECTIVE AND OBJECTIVE BOX
Patient is a 73y old  Female who presents with a chief complaint of L hip fracture (4/16/24      INTERVAL HPI/OVERNIGHT EVENTS: continues with complaint of L HIP pain. No chest pain, dizziness, SOB or palpitations. Patient has decided to have HIP sx today.     MEDICATIONS  (STANDING):  albuterol/ipratropium for Nebulization 3 milliLiter(s) Nebulizer every 6 hours  chlorhexidine 2% Cloths 1 Application(s) Topical <User Schedule>  gabapentin 100 milliGRAM(s) Oral every 8 hours  heparin   Injectable 5000 Unit(s) SubCutaneous every 8 hours  levothyroxine 112 MICROGram(s) Oral daily  lidocaine   4% Patch 2 Patch Transdermal daily  losartan 50 milliGRAM(s) Oral daily  morphine ER Tablet 15 milliGRAM(s) Oral every 12 hours  naloxone Injectable 0.4 milliGRAM(s) IV Push once  nicotine -   7 mG/24Hr(s) Patch 1 Patch Transdermal daily  piperacillin/tazobactam IVPB.. 3.375 Gram(s) IV Intermittent every 8 hours  polyethylene glycol 3350 17 Gram(s) Oral daily  potassium chloride  10 mEq/100 mL IVPB 10 milliEquivalent(s) IV Intermittent every 1 hour  predniSONE   Tablet 20 milliGRAM(s) Oral daily  senna 2 Tablet(s) Oral at bedtime  sodium chloride 0.9%. 1000 milliLiter(s) (60 mL/Hr) IV Continuous <Continuous>    MEDICATIONS  (PRN):  acetaminophen     Tablet .. 1000 milliGRAM(s) Oral every 8 hours PRN Mild Pain (1 - 3)  albuterol    90 MICROgram(s) HFA Inhaler 2 Puff(s) Inhalation every 6 hours PRN Bronchospasm  ALPRAZolam 1 milliGRAM(s) Oral every 12 hours PRN Anxiety  benzonatate 100 milliGRAM(s) Oral every 8 hours PRN Cough  bisacodyl 5 milliGRAM(s) Oral daily PRN Constipation  cyclobenzaprine 5 milliGRAM(s) Oral every 8 hours PRN Spasm  oxyCODONE    IR 10 milliGRAM(s) Oral every 4 hours PRN Severe Pain (7 - 10)  oxyCODONE    IR 5 milliGRAM(s) Oral every 4 hours PRN Moderate Pain (4 - 6)      __________________________________________________  REVIEW OF SYSTEMS:    CONSTITUTIONAL: No fever,   EYES: no acute visual disturbances  NECK: No pain or stiffness  RESPIRATORY: No cough; No shortness of breath  CARDIOVASCULAR: No chest pain, no palpitations  GASTROINTESTINAL: No pain. No nausea or vomiting; No diarrhea   NEUROLOGICAL: No headache or numbness, no tremors  MUSCULOSKELETAL: No joint pain, no muscle pain  GENITOURINARY: no dysuria, no frequency, no hesitancy  PSYCHIATRY: no depression , no anxiety  ALL OTHER  ROS negative        Vital Signs Last 24 Hrs  T(C): 36.8 (16 Apr 2024 05:12), Max: 37.1 (15 Apr 2024 13:10)  T(F): 98.2 (16 Apr 2024 05:12), Max: 98.8 (15 Apr 2024 13:10)  HR: 98 (16 Apr 2024 05:12) (87 - 98)  BP: 150/93 (16 Apr 2024 05:12) (134/86 - 165/90)  BP(mean): --  RR: 19 (16 Apr 2024 05:12) (18 - 19)  SpO2: 97% (16 Apr 2024 05:12) (93% - 97%)    Parameters below as of 16 Apr 2024 05:12  Patient On (Oxygen Delivery Method): nasal cannula  O2 Flow (L/min): 2      ________________________________________________  PHYSICAL EXAM:  GENERAL: NAD  HEENT: Normocephalic;  conjunctivae and sclerae clear; moist mucous membranes;   NECK : supple  CHEST/LUNG: dec breath sounds atbases  HEART: S1 S2  regular; no murmurs, gallops or rubs  ABDOMEN: Soft, Nontender, Nondistended; Bowel sounds present  EXTREMITIES: no cyanosis; no edema; no calf tenderness  SKIN: warm and dry; no rash  NERVOUS SYSTEM:  Awake and alert;                12.4   9.29  )-----------( 346      ( 16 Apr 2024 07:06 )             37.8     04-16    137  |  98  |  12  ----------------------------<  109<H>  3.3<L>   |  32<H>  |  0.44<L>    Ca    8.8      16 Apr 2024 07:06  Phos  3.2     04-16  Mg     2.5     04-16    TPro  6.9  /  Alb  2.9<L>  /  TBili  0.4  /  DBili  x   /  AST  13  /  ALT  18  /  AlkPhos  121<H>  04-16      Urinalysis Basic - ( 16 Apr 2024 07:06 )    Color: x / Appearance: x / SG: x / pH: x  Gluc: 109 mg/dL / Ketone: x  / Bili: x / Urobili: x   Blood: x / Protein: x / Nitrite: x   Leuk Esterase: x / RBC: x / WBC x   Sq Epi: x / Non Sq Epi: x / Bacteria: x      CAPILLARY BLOOD GLUCOSE            RADIOLOGY & ADDITIONAL TESTS:    Imaging Personally Reviewed:  YES/NO    Consultant(s) Notes Reviewed:   YES/ No    Care Discussed with Consultants :     Plan of care was discussed with patient and /or primary care giver; all questions and concerns were addressed and care was aligned with patient's wishes.

## 2024-04-16 NOTE — PROGRESS NOTE ADULT - SUBJECTIVE AND OBJECTIVE BOX
MR#[id number]  PATIENT NAME:ROXIE FAN    DATE OF SERVICE: 04-16-24 @ 13:44  Patient was seen and examined by Deric Salinas MD on    04-16-24 @ 13:44 .  Interim events noted.Consultant notes ,Labs,Telemetry reviewed by me       HOSPITAL COURSE: HPI:  73-year-old female from home ambulates with a walker at baseline with PMH of HTN, COPD ( not on O2 at home), current smoker with 60 ppy hx, hypothyroidism, spinal stenosis urinary incontinence, presenting with left-sided lower back and hip pain after fall 2 weeks ago. Approximately 1-2 weeks ago the patient fell while walking and her leg "gave out" and since then she has had left-sided leg pain.  Patient reports she is able to walk with her cane/walker but feels very weak and unbalanced and has since fallen 2 more times. Pt also complains of SOB. Patient reporting chest pain since the fall today after her cane hit her chest. Patient denies any headache, dizziness, cough, congestion, fever, chills, N,V,D,Abd pain, numbness or tingling. In the ED, pt is comfortable, but complains of pain, NAD, pt is wheezing b/l with R sided crackles, pt was found to be hypoxic on RA to 80s saturating well on 1-2 L NC, hypertensive 175/105, wbc 9, Na 130. CTH and cervical spine shows No gross acute intracranial hemorrhage. No gross acute fracture cervical spine. CTC shows Loss of height of multiple thoracic and lumbar spine vertebral bodies which are of indeterminate age. Dominant cluster of nodular and patchy opacities within the superior segment of the right lower lobe suggestive of infection with endobronchial spread. Emphysema. Intrahepatic and extrahepatic biliary ductal dilatation is of unclear etiology. CT pelvis shows Acute proximal left femoral fracture,  Age-indeterminate severe compression fracture of the L5 vertebral body. Pt is admitted for L hip fracture/ SOB 2/2 PNA vs COPD exacerbation.    Off note: pt states that she lost about 30 lbs in the last 2 months. She is undergoing colonoscopy o/p to evaluate for weight loss. pt denies any blood in the stool. However, she has family history of colon ca in her father.  (09 Apr 2024 19:22)      INTERIM EVENTS:Patient seen at bedside ,interim events noted.Patient agreeable for surgery-stsble respiratoty stsus no chest pain      PMH -reviewed admission note, no change since admission  HEART FAILURE: Acute[ ]Chronic[ ] Systolic[ ] Diastolic[ ] Combined Systolic and Diastolic[ ]  CAD[ ] CABG[ ] PCI[ ]  DEVICES[ ] PPM[ ] ICD[ ] ILR[ ]  ATRIAL FIBRILLATION[ ] Paroxysmal[ ] Permanent[ ] CHADS2-[  ]  NIKKI[ ] CKD1[ ] CKD2[ ] CKD3[ ] CKD4[ ] ESRD[ ]  COPD[ ] HTN[ ]   DM[ ] Type1[ ] Type 2[ ]   CVA[ ] Paresis[ ]    AMBULATION: Assisted[ ] Cane/walker[ ] Independent[ ]    MEDICATIONS  (STANDING):  albuterol/ipratropium for Nebulization 3 milliLiter(s) Nebulizer every 6 hours  chlorhexidine 2% Cloths 1 Application(s) Topical <User Schedule>  levothyroxine 112 MICROGram(s) Oral daily  lidocaine   4% Patch 2 Patch Transdermal daily  losartan 50 milliGRAM(s) Oral daily  morphine ER Tablet 15 milliGRAM(s) Oral every 12 hours  naloxone Injectable 0.4 milliGRAM(s) IV Push once  nicotine -   7 mG/24Hr(s) Patch 1 Patch Transdermal daily  piperacillin/tazobactam IVPB.. 3.375 Gram(s) IV Intermittent every 8 hours  polyethylene glycol 3350 17 Gram(s) Oral daily  predniSONE   Tablet 20 milliGRAM(s) Oral daily  senna 2 Tablet(s) Oral at bedtime  sodium chloride 0.9%. 1000 milliLiter(s) (60 mL/Hr) IV Continuous <Continuous>    MEDICATIONS  (PRN):  acetaminophen     Tablet .. 1000 milliGRAM(s) Oral every 8 hours PRN Mild Pain (1 - 3)  albuterol    90 MICROgram(s) HFA Inhaler 2 Puff(s) Inhalation every 6 hours PRN Bronchospasm  ALPRAZolam 1 milliGRAM(s) Oral every 12 hours PRN Anxiety  benzonatate 100 milliGRAM(s) Oral every 8 hours PRN Cough  bisacodyl 5 milliGRAM(s) Oral daily PRN Constipation  cyclobenzaprine 5 milliGRAM(s) Oral every 8 hours PRN Spasm  oxyCODONE    IR 5 milliGRAM(s) Oral every 4 hours PRN Moderate Pain (4 - 6)  oxyCODONE    IR 10 milliGRAM(s) Oral every 4 hours PRN Severe Pain (7 - 10)            REVIEW OF SYSTEMS:  Constitutional: [ ] fever, [ ]weight loss,  [ ]fatigue [ ]weight gain  Eyes: [ ] visual changes  Respiratory: [ ]shortness of breath;  [ ] cough, [ ]wheezing, [ ]chills, [ ]hemoptysis  Cardiovascular: [ ] chest pain, [ ]palpitations, [ ]dizziness,  [ ]leg swelling[ ]orthopnea[ ]PND  Gastrointestinal: [ ] abdominal pain, [ ]nausea, [ ]vomiting,  [ ]diarrhea [ ]Constipation [ ]Melena  Genitourinary: [ ] dysuria, [ ] hematuria [ ]Eli  Neurologic: [ ] headaches [ ] tremors[ ]weakness [ ]Paralysis Right[ ] Left[ ]  Skin: [ ] itching, [ ]burning, [ ] rashes  Endocrine: [ ] heat or cold intolerance  Musculoskeletal: [ ] joint pain or swelling; [ ] muscle, back, or extremity pain  Psychiatric: [ ] depression, [ ]anxiety, [ ]mood swings, or [ ]difficulty sleeping  Hematologic: [ ] easy bruising, [ ] bleeding gums    [ ] All remaining systems negative except as per above.   [ ]Unable to obtain.  [x] No change in ROS since admission      Vital Signs Last 24 Hrs  T(C): 36.8 (16 Apr 2024 05:12), Max: 36.8 (16 Apr 2024 05:12)  T(F): 98.2 (16 Apr 2024 05:12), Max: 98.2 (16 Apr 2024 05:12)  HR: 98 (16 Apr 2024 05:12) (87 - 98)  BP: 150/93 (16 Apr 2024 05:12) (134/86 - 165/90)  BP(mean): --  RR: 19 (16 Apr 2024 05:12) (18 - 19)  SpO2: 97% (16 Apr 2024 05:12) (93% - 97%)    Parameters below as of 16 Apr 2024 05:12  Patient On (Oxygen Delivery Method): nasal cannula  O2 Flow (L/min): 2    I&O's Summary    15 Apr 2024 07:01  -  16 Apr 2024 07:00  --------------------------------------------------------  IN: 0 mL / OUT: 1700 mL / NET: -1700 mL        PHYSICAL EXAM:  General: No acute distress BMI-  HEENT: EOMI, PERRL  Neck: Supple, [ ] JVD  Lungs: Equal air entry bilaterally; [ ] rales [ ] wheezing [ ] rhonchi  Heart: Regular rate and rhythm; [x ] murmur   2/6 [ x] systolic [ ] diastolic [ ] radiation[ ] rubs [ ]  gallops  Abdomen: Nontender, bowel sounds present  Extremities: No clubbing, cyanosis, [ ] edema [ ]Pulses  equal and intact  Nervous system:  Alert & Oriented X3, no focal deficits  Psychiatric: Normal affect  Skin: No rashes or lesions    LABS:  04-16    137  |  98  |  12  ----------------------------<  109<H>  3.3<L>   |  32<H>  |  0.44<L>    Ca    8.8      16 Apr 2024 07:06  Phos  3.2     04-16  Mg     2.5     04-16    TPro  6.9  /  Alb  2.9<L>  /  TBili  0.4  /  DBili  x   /  AST  13  /  ALT  18  /  AlkPhos  121<H>  04-16    Creatinine Trend: 0.44<--, 0.44<--, 0.45<--, 0.42<--, 0.40<--, 0.46<--                        12.4   9.29  )-----------( 346      ( 16 Apr 2024 07:06 )             37.8                      MR#[id number]  PATIENT NAME:ROXIE FAN    DATE OF SERVICE: 04-16-24 @ 13:44  Patient was seen and examined by Deric Salinas MD on    04-16-24 @ 13:44 .  Interim events noted.Consultant notes ,Labs,Telemetry reviewed by me       HOSPITAL COURSE: HPI:  73-year-old female from home ambulates with a walker at baseline with PMH of HTN, COPD ( not on O2 at home), current smoker with 60 ppy hx, hypothyroidism, spinal stenosis urinary incontinence, presenting with left-sided lower back and hip pain after fall 2 weeks ago. Approximately 1-2 weeks ago the patient fell while walking and her leg "gave out" and since then she has had left-sided leg pain.  Patient reports she is able to walk with her cane/walker but feels very weak and unbalanced and has since fallen 2 more times. Pt also complains of SOB. Patient reporting chest pain since the fall today after her cane hit her chest. Patient denies any headache, dizziness, cough, congestion, fever, chills, N,V,D,Abd pain, numbness or tingling. In the ED, pt is comfortable, but complains of pain, NAD, pt is wheezing b/l with R sided crackles, pt was found to be hypoxic on RA to 80s saturating well on 1-2 L NC, hypertensive 175/105, wbc 9, Na 130. CTH and cervical spine shows No gross acute intracranial hemorrhage. No gross acute fracture cervical spine. CTC shows Loss of height of multiple thoracic and lumbar spine vertebral bodies which are of indeterminate age. Dominant cluster of nodular and patchy opacities within the superior segment of the right lower lobe suggestive of infection with endobronchial spread. Emphysema. Intrahepatic and extrahepatic biliary ductal dilatation is of unclear etiology. CT pelvis shows Acute proximal left femoral fracture,  Age-indeterminate severe compression fracture of the L5 vertebral body. Pt is admitted for L hip fracture/ SOB 2/2 PNA vs COPD exacerbation.    Off note: pt states that she lost about 30 lbs in the last 2 months. She is undergoing colonoscopy o/p to evaluate for weight loss. pt denies any blood in the stool. However, she has family history of colon ca in her father.  (09 Apr 2024 19:22)      INTERIM EVENTS:Patient seen at bedside ,interim events noted.Patient agreeable for surgery-stsble respiratoty stsus no chest pain      PMH -reviewed admission note, no change since admission  HEART FAILURE: Acute[ ]Chronic[ ] Systolic[ ] Diastolic[ ] Combined Systolic and Diastolic[ ]  CAD[ ] CABG[ ] PCI[ ]  DEVICES[ ] PPM[ ] ICD[ ] ILR[ ]  ATRIAL FIBRILLATION[ ] Paroxysmal[ ] Permanent[ ] CHADS2-[  ]  NIKKI[ ] CKD1[ ] CKD2[ ] CKD3[ ] CKD4[ ] ESRD[ ]  COPD[ ] HTN[ ]   DM[ ] Type1[ ] Type 2[ ]   CVA[ ] Paresis[ ]    AMBULATION: Assisted[ ] Cane/walker[ ] Independent[ ]    MEDICATIONS  (STANDING):  albuterol/ipratropium for Nebulization 3 milliLiter(s) Nebulizer every 6 hours  chlorhexidine 2% Cloths 1 Application(s) Topical <User Schedule>  levothyroxine 112 MICROGram(s) Oral daily  lidocaine   4% Patch 2 Patch Transdermal daily  losartan 50 milliGRAM(s) Oral daily  morphine ER Tablet 15 milliGRAM(s) Oral every 12 hours  naloxone Injectable 0.4 milliGRAM(s) IV Push once  nicotine -   7 mG/24Hr(s) Patch 1 Patch Transdermal daily  piperacillin/tazobactam IVPB.. 3.375 Gram(s) IV Intermittent every 8 hours  polyethylene glycol 3350 17 Gram(s) Oral daily  predniSONE   Tablet 20 milliGRAM(s) Oral daily  senna 2 Tablet(s) Oral at bedtime  sodium chloride 0.9%. 1000 milliLiter(s) (60 mL/Hr) IV Continuous <Continuous>    MEDICATIONS  (PRN):  acetaminophen     Tablet .. 1000 milliGRAM(s) Oral every 8 hours PRN Mild Pain (1 - 3)  albuterol    90 MICROgram(s) HFA Inhaler 2 Puff(s) Inhalation every 6 hours PRN Bronchospasm  ALPRAZolam 1 milliGRAM(s) Oral every 12 hours PRN Anxiety  benzonatate 100 milliGRAM(s) Oral every 8 hours PRN Cough  bisacodyl 5 milliGRAM(s) Oral daily PRN Constipation  cyclobenzaprine 5 milliGRAM(s) Oral every 8 hours PRN Spasm  oxyCODONE    IR 5 milliGRAM(s) Oral every 4 hours PRN Moderate Pain (4 - 6)  oxyCODONE    IR 10 milliGRAM(s) Oral every 4 hours PRN Severe Pain (7 - 10)            REVIEW OF SYSTEMS:  Constitutional: [ ] fever, [ ]weight loss,  [ ]fatigue [ ]weight gain  Eyes: [ ] visual changes  Respiratory: [ ]shortness of breath;  [ ] cough, [ ]wheezing, [ ]chills, [ ]hemoptysis  Cardiovascular: [ ] chest pain, [ ]palpitations, [ ]dizziness,  [ ]leg swelling[ ]orthopnea[ ]PND  Gastrointestinal: [ ] abdominal pain, [ ]nausea, [ ]vomiting,  [ ]diarrhea [ ]Constipation [ ]Melena  Genitourinary: [ ] dysuria, [ ] hematuria [ ]Eli  Neurologic: [ ] headaches [ ] tremors[ ]weakness [ ]Paralysis Right[ ] Left[ ]  Skin: [ ] itching, [ ]burning, [ ] rashes  Endocrine: [ ] heat or cold intolerance  Musculoskeletal: [ ] joint pain or swelling; [ ] muscle, back, or extremity pain  Psychiatric: [ ] depression, [ ]anxiety, [ ]mood swings, or [ ]difficulty sleeping  Hematologic: [ ] easy bruising, [ ] bleeding gums    [ ] All remaining systems negative except as per above.   [ ]Unable to obtain.  [x] No change in ROS since admission      Vital Signs Last 24 Hrs  T(C): 36.8 (16 Apr 2024 05:12), Max: 36.8 (16 Apr 2024 05:12)  T(F): 98.2 (16 Apr 2024 05:12), Max: 98.2 (16 Apr 2024 05:12)  HR: 98 (16 Apr 2024 05:12) (87 - 98)  BP: 150/93 (16 Apr 2024 05:12) (134/86 - 165/90)  BP(mean): --  RR: 19 (16 Apr 2024 05:12) (18 - 19)  SpO2: 97% (16 Apr 2024 05:12) (93% - 97%)    Parameters below as of 16 Apr 2024 05:12  Patient On (Oxygen Delivery Method): nasal cannula  O2 Flow (L/min): 2    I&O's Summary    15 Apr 2024 07:01  -  16 Apr 2024 07:00  --------------------------------------------------------  IN: 0 mL / OUT: 1700 mL / NET: -1700 mL        PHYSICAL EXAM:  General: No acute distress BMI-  HEENT: EOMI, PERRL  Neck: Supple, [ ] JVD  Lungs: Equal air entry bilaterally; [ ] rales [ ] wheezing [ ] rhonchi  Heart: Regular rate and rhythm; [x ] murmur   2/6 [ x] systolic [ ] diastolic [ ] radiation[ ] rubs [ ]  gallops  Abdomen: Nontender, bowel sounds present  Extremities: No clubbing, cyanosis, [ ] edema [ ]Pulses  equal and intact  Nervous system:  Alert & Oriented X3, no focal deficits  Psychiatric: Normal affect  Skin: No rashes or lesions    LABS:  04-16    137  |  98  |  12  ----------------------------<  109<H>  3.3<L>   |  32<H>  |  0.44<L>    Ca    8.8      16 Apr 2024 07:06  Phos  3.2     04-16  Mg     2.5     04-16    TPro  6.9  /  Alb  2.9<L>  /  TBili  0.4  /  DBili  x   /  AST  13  /  ALT  18  /  AlkPhos  121<H>  04-16    Creatinine Trend: 0.44<--, 0.44<--, 0.45<--, 0.42<--, 0.40<--, 0.46<--                        12.4   9.29  )-----------( 346      ( 16 Apr 2024 07:06 )             37.8                TTE W or WO Ultrasound Enhancing Agent (04.15.24 @ 16:12) >  CONCLUSIONS:      1. Left ventricular cavity is normal in size. Left ventricular systolic function is hyperdynamic. There are no regional wall motion abnormalities seen.   2. There is mild (grade 1) left ventricular diastolic dysfunction.   3. Normal right ventricular cavity size, with normal wall thickness, and normal systolic function. Tricuspid annular plane systolic excursion (TAPSE) is 2.4 cm (normal >=1.7 cm).   4. The left atrium is mildly dilated.  5. The right atrium is normal in size.   6. Estimated pulmonary artery systolic pressure is 29 mmHg, consistent with normal pulmonary artery pressure.   7. Mild left ventricular hypertrophy.   8. Technically difficult image quality.   9. There is calcification of the mitral valve annulus.  10. There is increased LV mass and concentric hypertrophy.  11. No pericardial effusion seen.  12. No prior echocardiogram is available for comparison.    ___________________________________________________________________

## 2024-04-16 NOTE — PROGRESS NOTE ADULT - SUBJECTIVE AND OBJECTIVE BOX
Source of information: [facility], Chart review  Patient language: English  : n/a    HPI:  73-year-old female from home ambulates with a walker at baseline with PMH of HTN, COPD ( not on O2 at home), current smoker with 60 ppy hx, hypothyroidism, spinal stenosis urinary incontinence, presenting with left-sided lower back and hip pain after fall 2 weeks ago. Approximately 1-2 weeks ago the patient fell while walking and her leg "gave out" and since then she has had left-sided leg pain.  Patient reports she is able to walk with her cane/walker but feels very weak and unbalanced and has since fallen 2 more times. Pt also complains of SOB. Patient reporting chest pain since the fall today after her cane hit her chest. Patient denies any headache, dizziness, cough, congestion, fever, chills, N,V,D,Abd pain, numbness or tingling. In the ED, pt is comfortable, but complains of pain, NAD, pt is wheezing b/l with R sided crackles, pt was found to be hypoxic on RA to 80s saturating well on 1-2 L NC, hypertensive 175/105, wbc 9, Na 130. CTH and cervical spine shows No gross acute intracranial hemorrhage. No gross acute fracture cervical spine. CTC shows Loss of height of multiple thoracic and lumbar spine vertebral bodies which are of indeterminate age. Dominant cluster of nodular and patchy opacities within the superior segment of the right lower lobe suggestive of infection with endobronchial spread. Emphysema. Intrahepatic and extrahepatic biliary ductal dilatation is of unclear etiology. CT pelvis shows Acute proximal left femoral fracture,  Age-indeterminate severe compression fracture of the L5 vertebral body. Pt is admitted for L hip fracture/ SOB 2/2 PNA vs COPD exacerbation.  Off note: pt states that she lost about 30 lbs in the last 2 months. She is undergoing colonoscopy o/p to evaluate for weight loss. pt denies any blood in the stool. However, she has family history of colon ca in her father.  (09 Apr 2024 19:22)  This is a Patient is a 73y old  Female who presents with a chief complaint of L hip fracture (13 Apr 2024 15:04)  X-ray of pelvis on 4/9 demonstrated diffuse bony demineralization with a fracture along the intertrochanteric line of the proximal left femur, as detailed above. No dislocation of the hips.  CTAP 4/11 demonstrated osteoporosis, moderate to severe L1, L2, L4 and L5 compression fractures. No osseous retropulsion.  CT Pelvis 4/9 demonstrated acute proximal left femoral fracture, as above. Age-indeterminate severe compression fracture of the L5 vertebral body.    Pt is admitted for s/p fall, left hip pain. CT pelvis shows cute proximal left femoral fracture, Age-indeterminate severe compression fracture of the L5 vertebral body. CTAP 4/11 demonstrated moderate to severe L1, L2, L4 and L5 compression fractures. Pain consulted for left hip pain 4/11. Pt with Hx of chronic opioid dependence.  Pt seen and examined at bedside this afternoon in the presence of her  Jimbo. At time of assessment, patient very anxious and upset stating, "I didn't sleep all last night due to my pain!" Pt reports pain score "20/10" to left hip and low back SALE USED: (1-10 VNRS). Pt describes pain as localized to low back and left hip, constant, throbbing, sharp in quality with accompanied numbness and tingling to the LLE. The pain is exacerbated by movement and not alleviated with current regimen. Pt tolerating PO diet. Denies lethargy, nausea, vomiting, constipation, itchiness. Reports last BM 4/16. Patient stated goal for pain control: to be able to take deep breaths, get out of bed to chair and ambulate with tolerable pain control. Pt reports walking with cane and walker baseline. Pt being followed by Ortho. I-Stop reviewed. Pt on Morphine 60 mg ER PO BID, last refill on 3/2024, oxycodone and Xanax.    PAST MEDICAL & SURGICAL HISTORY:  HTN (hypertension)    Hypothyroidism    COPD (chronic obstructive pulmonary disease)    Spinal stenosis    Current smoker    H/O foot surgery    FAMILY HISTORY:  FH: colon cancer (Father)    Social History:  lives with  ambulates with a cane or walker (09 Apr 2024 19:22)   [x]Denies ETOH use, illicit drug use  + smoking    Allergies    contrast media (iodine-based) (Unknown)    Intolerances    MEDICATIONS  (STANDING):  albuterol/ipratropium for Nebulization 3 milliLiter(s) Nebulizer every 6 hours  chlorhexidine 2% Cloths 1 Application(s) Topical <User Schedule>  levothyroxine 112 MICROGram(s) Oral daily  lidocaine   4% Patch 2 Patch Transdermal daily  losartan 50 milliGRAM(s) Oral daily  morphine ER Tablet 15 milliGRAM(s) Oral every 12 hours  naloxone Injectable 0.4 milliGRAM(s) IV Push once  nicotine -   7 mG/24Hr(s) Patch 1 Patch Transdermal daily  piperacillin/tazobactam IVPB.. 3.375 Gram(s) IV Intermittent every 8 hours  polyethylene glycol 3350 17 Gram(s) Oral daily  potassium chloride  10 mEq/100 mL IVPB 10 milliEquivalent(s) IV Intermittent every 1 hour  predniSONE   Tablet 20 milliGRAM(s) Oral daily  senna 2 Tablet(s) Oral at bedtime  sodium chloride 0.9%. 1000 milliLiter(s) (60 mL/Hr) IV Continuous <Continuous>    MEDICATIONS  (PRN):  acetaminophen     Tablet .. 1000 milliGRAM(s) Oral every 8 hours PRN Mild Pain (1 - 3)  albuterol    90 MICROgram(s) HFA Inhaler 2 Puff(s) Inhalation every 6 hours PRN Bronchospasm  ALPRAZolam 1 milliGRAM(s) Oral every 12 hours PRN Anxiety  benzonatate 100 milliGRAM(s) Oral every 8 hours PRN Cough  bisacodyl 5 milliGRAM(s) Oral daily PRN Constipation  cyclobenzaprine 5 milliGRAM(s) Oral every 8 hours PRN Spasm  oxyCODONE    IR 5 milliGRAM(s) Oral every 4 hours PRN Moderate Pain (4 - 6)  oxyCODONE    IR 10 milliGRAM(s) Oral every 4 hours PRN Severe Pain (7 - 10)    Vital Signs Last 24 Hrs  T(C): 36.8 (16 Apr 2024 05:12), Max: 37.1 (15 Apr 2024 13:10)  T(F): 98.2 (16 Apr 2024 05:12), Max: 98.8 (15 Apr 2024 13:10)  HR: 98 (16 Apr 2024 05:12) (87 - 98)  BP: 150/93 (16 Apr 2024 05:12) (134/86 - 165/90)  BP(mean): --  RR: 19 (16 Apr 2024 05:12) (18 - 19)  SpO2: 97% (16 Apr 2024 05:12) (93% - 97%)    Parameters below as of 16 Apr 2024 05:12  Patient On (Oxygen Delivery Method): nasal cannula  O2 Flow (L/min): 2    SARS-CoV-2: NotDetec (09 Apr 2024 21:24)    LABS: Reviewed                          12.4   9.29  )-----------( 346      ( 16 Apr 2024 07:06 )             37.8     04-16    137  |  98  |  12  ----------------------------<  109<H>  3.3<L>   |  32<H>  |  0.44<L>    Ca    8.8      16 Apr 2024 07:06  Phos  3.2     04-16  Mg     2.5     04-16    TPro  6.9  /  Alb  2.9<L>  /  TBili  0.4  /  DBili  x   /  AST  13  /  ALT  18  /  AlkPhos  121<H>  04-16    LIVER FUNCTIONS - ( 16 Apr 2024 07:06 )  Alb: 2.9 g/dL / Pro: 6.9 g/dL / ALK PHOS: 121 U/L / ALT: 18 U/L DA / AST: 13 U/L / GGT: x           Urinalysis Basic - ( 16 Apr 2024 07:06 )    Color: x / Appearance: x / SG: x / pH: x  Gluc: 109 mg/dL / Ketone: x  / Bili: x / Urobili: x   Blood: x / Protein: x / Nitrite: x   Leuk Esterase: x / RBC: x / WBC x   Sq Epi: x / Non Sq Epi: x / Bacteria: x    CAPILLARY BLOOD GLUCOSE    SARS-CoV-2: NotDetec (09 Apr 2024 21:24)    Radiology: Reviewed  ACC: [id number] EXAM:  MR BRAIN   ORDERED BY: TAMMI CHRISTIE     ACC: [id number] EXAM:  MR SPINE THORACIC   ORDERED BY: TAMMI CHRISTIE     ACC: [id number] EXAM:  MR SPINE CERVICAL   ORDERED BY: TAMMI CHRISTIE     PROCEDURE DATE:  04/11/2024      INTERPRETATION:  Three examinations were performed in this patient:  1.  MR of the brain without gadolinium contrast  2.  MR cervical spine without gadolinium contrast  3.  MR thoracic spine without gadolinium contrast    CLINICAL INFORMATION: Post fall trauma cord compression  HMR  Admitting   Dxs: S72.009A FRACTURE OF UNSP PART OF NECK OF UNSP FEMUR, INIT    TECHNIQUE:  1. MR brain:  Sagittal and axial T1-weighted images, axial FLAIR images,   axial susceptibility weighted images, axial T2-weighted images and axial   diffusion weighted images of the brain were obtained.  2.  MR cervical spine:   Sagittal T2-weighted images were obtained.  3.  MR thoracic spine:   Sagittal T2-weighted images were obtained.  The remainder these examinations could not be completed, not tolerated by   the patient. The images that could be obtained are degraded by patient   motion.  CONTRAST:    None    COMPARISON:   CT head 4/10/2023 and CT cervical and CT chest 4/9/2023    FINDINGS:    BRAIN    BRAIN:   The brain demonstrates small focal lesions of remote deep   infarction. The most conspicuous are noted in the external capsules. No   cerebral cortical lesion is recognized.   No diffusion restriction is   found in the brain.  No acute cerebral cortical infarct is found.   No   intracranial hemorrhage is recognized.  No mass effect is found in the   brain.   The brain also demonstrates patchy indistinct lesions within the   deep cerebral hemispheric white matter typical for ischemic white matter   disease.    CSF SPACES:   The ventricles, sulci and basal cisterns appear moderately   dilated reflecting diffuse brain volume loss.    VESSELS:   The vertebral and internal carotid arteries demonstrate   expected flow voids indicating their patency.    HEAD AND NECK STRUCTURES:   The orbits are unremarkable.  Paranasal   sinuses are clear.  The nasal cavity demonstrates left-to-right septal   deviation. Left middle turbinate tristen bullosa is present..  The central   skull base appears intact.  The nasopharynx is symmetric.  The temporal   bones appear clear of disease.  The calvarium appears unremarkable.    CERVICAL SPINE    Cervical vertebral alignment is significant for grade 1 anterior   listhesis C4 on C5, also slight retrolisthesis C5 on C6.   Facet joints   appear aligned.   Cervical vertebral body heights are maintained.   Cervical vertebral marrow signal intensity mildly heterogeneous from   degenerative endplate changes. These include small marginal osteophytes   most prominently at C5-C6.  No osseous expansion or epidural disease is   found.  No destructive bone lesion is found.   There are osteoarthritic   changes at the articulation of the anterior ring of C1 and the odontoid   process of C2.  This arthropathy includes marginal osteophytes,   subchondral sclerosis and joint space narrowing.    Cervical intervertebral disc spaces demonstrate a generalized pattern of   degeneration characterized by signal intensity loss on the long TR   images. Degenerative disc heightloss appears greatest at C5-C6.   Evaluation of degenerative disc disease is limited in the absence of   axial images.   However, no degenerative high-grade central canal   compromise is recognized.  Neural foramina appear narrowed by disc   materialand uncovertebral joint osteophytes most prominently at the   lower cervical intervertebral disc levels..    Cervical cord morphology Is preserved.  The cervical cord maintains   intact signal intensity   No focal intrinsic cord lesion is identified.   No cord expansion or cord volume loss is recognized.    The visualized adjacent neck structures appear intact.  No neck mass is   recognized.  Paraspinal soft tissues appear intact.  Visualized lymph   nodes appear to be within physiologic size limits.    THORACIC SPINE    Thoracic vertebral alignment demonstrates exaggeration of the mid   thoracic kyphosis. This vertebral malalignment has an apex at the T7   inferior endplate compression fracture. Approximately 70% height loss   results at this level. More limited compression fractures involve the T6   inferior endplate and the T9 superior endplate. Schmorl's nodes result.   More limited superior endplate deformity of T5 and T11. The remaining   thoracic vertebral body heights are maintained. Superior endplate   deformities are also present at L1-L2 and L4. At the L2 vertebral body   burst fracture pattern is present with impression upon the ventral thecal   sac but does not appear to compromise the cauda equina. At the thoracic   vertebral fracture is no high-grade central canal compromise is   recognized. Thoracic vertebral marrow signal intensity relatively   homogeneous without recognition of significant marrow edema.  No   destructive bone lesion, osseous expansion or epidural disease is found.    The paraspinal soft tissues appear intact.    Thoracic intervertebral disc spaces demonstrate a generalized pattern of   at least mild degeneration with signal intensity loss on the long TR   images. Disc heights are largely maintained.   No high-grade degenerative   central canal compromise is recognized. Thoracic neural foramina appear   intact.    The thoracic cord morphology is preserved.  The thoracic cord maintains   intact signal intensity.    IMPRESSION:    1. BRAIN:   Basal ganglia lacunar infarctions. Ischemic white matter   disease and atrophy typical for age    2. CERVICAL SPINE:   Grade 1 anterolisthesis C4 on C5. Low-grade cervical   degenerative disc disease. Cervical cord appears intact.    3. THORACIC SPINE:   Exaggeration in mid thoracic kyphosis with multiple   thoracic endplate deformities that are age indeterminate but felt likely   to be long-standing. Thoracic cord appears intact.    4. Only limited incomplete examinations could be performed on this date.   Recommend completion scans once tolerated by the patient.    --- End of Report ---    GRAZYNA CRAWLEY MD; Attending Radiologist  This document has been electronically signed. Apr 11 2024  8:02PM    ORT Score -   Family Hx of substance abuse	Female	      Male  Alcohol 	                                           1                     3  Illegal drugs	                                   2                     3  Rx drugs                                           4 	                  4  Personal Hx of substance abuse		  Alcohol 	                                          3	                  3  Illegal drugs                                     4	                  4  Rx drugs                                            5 	                  5  Age between 16- 45 years	           1                     1  hx preadolescent sexual abuse	   3 	                  0  Psychological disease		  ADD, OCD, bipolar, schizophrenia   2	          2  Depression                                           1 	          1  Total: 5    a score of 3 or lower indicates low risk for opioid abuse		  a score of 4-7 indicates moderate risk for opioid abuse		  a score of 8 or higher indicates high risk for opioid abuse    REVIEW OF SYSTEMS:  CONSTITUTIONAL: No fever or fatigue  HEENT:  No difficulty hearing, no change in vision  NECK: No pain or stiffness  RESPIRATORY: + cough, no wheezing, chills or hemoptysis; + intermittent shortness of breath, +congested  CARDIOVASCULAR: No chest pain, palpitations, dizziness, or leg swelling  GASTROINTESTINAL: No loss of appetite, decreased PO intake. No abdominal or epigastric pain. No nausea, vomiting; No diarrhea or constipation.   GENITOURINARY: No dysuria, frequency, hematuria, retention, + incontinence  MUSCULOSKELETAL: + back pain; +left hip pain, + falls   NEURO: No headaches, No numbness/tingling b/l LE, No weakness  ENDOCRINE: No polyuria, polydipsia, heat or cold intolerance; No hair loss  PSYCHIATRIC: + history of anxiety    PHYSICAL EXAM:  GENERAL:  Alert & Oriented X 4, cooperative, NAD, Good concentration. Speech is clear.   RESPIRATORY: Respirations even and unlabored, BS decreased to the bases, +congested  CARDIOVASCULAR: Normal S1/S2, regular rate and rhythm  GENITOURINARY: Eli catheter in place with clear yellow urine.  GASTROINTESTINAL: Soft, Nontender, Nondistended; Bowel sounds present  PERIPHERAL VASCULAR:  Extremities warm without edema. 2+ Peripheral Pulses, No cyanosis, No calf tenderness  MUSCULOSKELETAL: Motor Strength 5/5 B/L upper and 3/5 lower extremities; moves all extremities equally against gravity; ROM decreased to LLE; + tenderness on palpation of left hip and low back.  SKIN: Warm, dry, intact. + bruise noted on right cheek noted.    Risk factors associated with adverse outcomes related to opioid treatment  [x]  Concurrent benzodiazepine use  [ ]  History/ Active substance use or alcohol use disorder  [x] Psychiatric co-morbidity  [ ] Sleep apnea  [x] COPD  [ ] BMI> 35  [ ] Liver dysfunction  [ ] Renal dysfunction  [ ] CHF  [x] Smoker  [x]  Age > 60 years    [x]  [place]  Reviewed and Copied to Chart. See below.    Plan of care and goal oriented pain management treatment options were discussed with patient and /or primary care giver; all questions and concerns were addressed and care was aligned with patient's wishes.    Educated patient on goal oriented pain management treatment options

## 2024-04-16 NOTE — BRIEF OPERATIVE NOTE - NSICDXBRIEFPROCEDURE_GEN_ALL_CORE_FT
PROCEDURES:  Insertion of locking intramedullary janneth into left hip 16-Apr-2024 21:03:33  Umberto Owens

## 2024-04-16 NOTE — PROGRESS NOTE ADULT - SUBJECTIVE AND OBJECTIVE BOX
NP Note discussed with  Primary Attending    Patient is a 73y old  Female who presents with a chief complaint of L hip fracture (15 Apr 2024 15:55)      INTERVAL HPI/OVERNIGHT EVENTS: continues with complaint of L HIP pain. No chest pain, dizziness, SOB or palpitations. Patient has decided to have HIP sx today.     MEDICATIONS  (STANDING):  albuterol/ipratropium for Nebulization 3 milliLiter(s) Nebulizer every 6 hours  chlorhexidine 2% Cloths 1 Application(s) Topical <User Schedule>  gabapentin 100 milliGRAM(s) Oral every 8 hours  heparin   Injectable 5000 Unit(s) SubCutaneous every 8 hours  levothyroxine 112 MICROGram(s) Oral daily  lidocaine   4% Patch 2 Patch Transdermal daily  losartan 50 milliGRAM(s) Oral daily  morphine ER Tablet 15 milliGRAM(s) Oral every 12 hours  naloxone Injectable 0.4 milliGRAM(s) IV Push once  nicotine -   7 mG/24Hr(s) Patch 1 Patch Transdermal daily  piperacillin/tazobactam IVPB.. 3.375 Gram(s) IV Intermittent every 8 hours  polyethylene glycol 3350 17 Gram(s) Oral daily  potassium chloride  10 mEq/100 mL IVPB 10 milliEquivalent(s) IV Intermittent every 1 hour  predniSONE   Tablet 20 milliGRAM(s) Oral daily  senna 2 Tablet(s) Oral at bedtime  sodium chloride 0.9%. 1000 milliLiter(s) (60 mL/Hr) IV Continuous <Continuous>    MEDICATIONS  (PRN):  acetaminophen     Tablet .. 1000 milliGRAM(s) Oral every 8 hours PRN Mild Pain (1 - 3)  albuterol    90 MICROgram(s) HFA Inhaler 2 Puff(s) Inhalation every 6 hours PRN Bronchospasm  ALPRAZolam 1 milliGRAM(s) Oral every 12 hours PRN Anxiety  benzonatate 100 milliGRAM(s) Oral every 8 hours PRN Cough  bisacodyl 5 milliGRAM(s) Oral daily PRN Constipation  cyclobenzaprine 5 milliGRAM(s) Oral every 8 hours PRN Spasm  oxyCODONE    IR 10 milliGRAM(s) Oral every 4 hours PRN Severe Pain (7 - 10)  oxyCODONE    IR 5 milliGRAM(s) Oral every 4 hours PRN Moderate Pain (4 - 6)      __________________________________________________  REVIEW OF SYSTEMS:    CONSTITUTIONAL: No fever,   EYES: no acute visual disturbances  NECK: No pain or stiffness  RESPIRATORY: No cough; No shortness of breath  CARDIOVASCULAR: No chest pain, no palpitations  GASTROINTESTINAL: No pain. No nausea or vomiting; No diarrhea   NEUROLOGICAL: No headache or numbness, no tremors  MUSCULOSKELETAL: No joint pain, no muscle pain  GENITOURINARY: no dysuria, no frequency, no hesitancy  PSYCHIATRY: no depression , no anxiety  ALL OTHER  ROS negative        Vital Signs Last 24 Hrs  T(C): 36.8 (16 Apr 2024 05:12), Max: 37.1 (15 Apr 2024 13:10)  T(F): 98.2 (16 Apr 2024 05:12), Max: 98.8 (15 Apr 2024 13:10)  HR: 98 (16 Apr 2024 05:12) (87 - 98)  BP: 150/93 (16 Apr 2024 05:12) (134/86 - 165/90)  BP(mean): --  RR: 19 (16 Apr 2024 05:12) (18 - 19)  SpO2: 97% (16 Apr 2024 05:12) (93% - 97%)    Parameters below as of 16 Apr 2024 05:12  Patient On (Oxygen Delivery Method): nasal cannula  O2 Flow (L/min): 2      ________________________________________________  PHYSICAL EXAM:  GENERAL: NAD  HEENT: Normocephalic;  conjunctivae and sclerae clear; moist mucous membranes;   NECK : supple  CHEST/LUNG: Clear to auscultation bilaterally with good air entry   HEART: S1 S2  regular; no murmurs, gallops or rubs  ABDOMEN: Soft, Nontender, Nondistended; Bowel sounds present  EXTREMITIES: no cyanosis; no edema; no calf tenderness  SKIN: warm and dry; no rash  NERVOUS SYSTEM:  Awake and alert; Oriented  to place, person and time ; no new deficits    _________________________________________________  LABS:                        12.4   9.29  )-----------( 346      ( 16 Apr 2024 07:06 )             37.8     04-16    137  |  98  |  12  ----------------------------<  109<H>  3.3<L>   |  32<H>  |  0.44<L>    Ca    8.8      16 Apr 2024 07:06  Phos  3.2     04-16  Mg     2.5     04-16    TPro  6.9  /  Alb  2.9<L>  /  TBili  0.4  /  DBili  x   /  AST  13  /  ALT  18  /  AlkPhos  121<H>  04-16      Urinalysis Basic - ( 16 Apr 2024 07:06 )    Color: x / Appearance: x / SG: x / pH: x  Gluc: 109 mg/dL / Ketone: x  / Bili: x / Urobili: x   Blood: x / Protein: x / Nitrite: x   Leuk Esterase: x / RBC: x / WBC x   Sq Epi: x / Non Sq Epi: x / Bacteria: x      CAPILLARY BLOOD GLUCOSE            RADIOLOGY & ADDITIONAL TESTS:    Imaging Personally Reviewed:  YES/NO    Consultant(s) Notes Reviewed:   YES/ No    Care Discussed with Consultants :     Plan of care was discussed with patient and /or primary care giver; all questions and concerns were addressed and care was aligned with patient's wishes.

## 2024-04-16 NOTE — PROGRESS NOTE ADULT - SUBJECTIVE AND OBJECTIVE BOX
Time of Visit:  Patient seen and examined.     MEDICATIONS  (STANDING):  albuterol/ipratropium for Nebulization 3 milliLiter(s) Nebulizer every 6 hours  chlorhexidine 2% Cloths 1 Application(s) Topical <User Schedule>  levothyroxine 112 MICROGram(s) Oral daily  lidocaine   4% Patch 2 Patch Transdermal daily  losartan 50 milliGRAM(s) Oral daily  morphine ER Tablet 15 milliGRAM(s) Oral every 12 hours  naloxone Injectable 0.4 milliGRAM(s) IV Push once  nicotine -   7 mG/24Hr(s) Patch 1 Patch Transdermal daily  piperacillin/tazobactam IVPB.. 3.375 Gram(s) IV Intermittent every 8 hours  polyethylene glycol 3350 17 Gram(s) Oral daily  predniSONE   Tablet 20 milliGRAM(s) Oral daily  senna 2 Tablet(s) Oral at bedtime  sodium chloride 0.9%. 1000 milliLiter(s) (60 mL/Hr) IV Continuous <Continuous>      MEDICATIONS  (PRN):  acetaminophen     Tablet .. 1000 milliGRAM(s) Oral every 8 hours PRN Mild Pain (1 - 3)  albuterol    90 MICROgram(s) HFA Inhaler 2 Puff(s) Inhalation every 6 hours PRN Bronchospasm  ALPRAZolam 1 milliGRAM(s) Oral every 12 hours PRN Anxiety  benzonatate 100 milliGRAM(s) Oral every 8 hours PRN Cough  bisacodyl 5 milliGRAM(s) Oral daily PRN Constipation  cyclobenzaprine 5 milliGRAM(s) Oral every 8 hours PRN Spasm  oxyCODONE    IR 5 milliGRAM(s) Oral every 4 hours PRN Moderate Pain (4 - 6)  oxyCODONE    IR 10 milliGRAM(s) Oral every 4 hours PRN Severe Pain (7 - 10)       Medications up to date at time of exam.      PHYSICAL EXAMINATION:  Patient has no new complaints.  GENERAL: The patient  in no apparent distress.     Vital Signs Last 24 Hrs  T(C): 36.7 (16 Apr 2024 14:43), Max: 36.8 (16 Apr 2024 05:12)  T(F): 98.1 (16 Apr 2024 14:43), Max: 98.2 (16 Apr 2024 05:12)  HR: 84 (16 Apr 2024 14:43) (84 - 98)  BP: 136/76 (16 Apr 2024 14:43) (134/86 - 165/90)  BP(mean): --  RR: 20 (16 Apr 2024 14:43) (18 - 20)  SpO2: 92% (16 Apr 2024 14:43) (92% - 97%)    Parameters below as of 16 Apr 2024 14:43  Patient On (Oxygen Delivery Method): nasal cannula  O2 Flow (L/min): 2     (if applicable)    Chest Tube (if applicable)    HEENT: Head is normocephalic and atraumatic. Extraocular muscles are intact. Mucous membranes are moist.     NECK: Supple, no palpable adenopathy.    LUNGS: Fair bilateral berath sounds  no wheezing, rales, or rhonchi.    HEART: Regular rate and rhythm without murmur.    ABDOMEN: Soft, nontender, and nondistended.  No hepatosplenomegaly is noted.    : No painful voiding, no pelvic pain    EXTREMITIES: Without any cyanosis, clubbing, rash, lesions or edema.    NEUROLOGIC: Awake, alert, oriented, grossly intact    SKIN: Warm, dry, good turgor.      LABS:                        12.4   9.29  )-----------( 346      ( 16 Apr 2024 07:06 )             37.8     04-16    137  |  98  |  12  ----------------------------<  109<H>  3.3<L>   |  32<H>  |  0.44<L>    Ca    8.8      16 Apr 2024 07:06  Phos  3.2     04-16  Mg     2.5     04-16    TPro  6.9  /  Alb  2.9<L>  /  TBili  0.4  /  DBili  x   /  AST  13  /  ALT  18  /  AlkPhos  121<H>  04-16      Urinalysis Basic - ( 16 Apr 2024 07:06 )    Color: x / Appearance: x / SG: x / pH: x  Gluc: 109 mg/dL / Ketone: x  / Bili: x / Urobili: x   Blood: x / Protein: x / Nitrite: x   Leuk Esterase: x / RBC: x / WBC x   Sq Epi: x / Non Sq Epi: x / Bacteria: x                  Procalcitonin, Serum: 0.08 ng/mL (04-16-24 @ 07:06)      MICROBIOLOGY: (if applicable)    RADIOLOGY & ADDITIONAL STUDIES:  EKG:   CXR:  ECHO:    IMPRESSION: 73y Female PAST MEDICAL & SURGICAL HISTORY:  HTN (hypertension)      Hypothyroidism      COPD (chronic obstructive pulmonary disease)      Spinal stenosis      Current smoker      COPD with pneumonia      H/O foot surgery       p/w         Impression: This is a 73 yr old woman  with s/p multiple Falls at Home and sustained Left Hip fracture . Presented with complains of episode of SOB , hypoxia in ED with o2 saturation 80s room air due to Acute hypoxic respiratory failure secondary to combination of Right lung Pneumonia showing on CT chest   and COPD / emphysema exacerbation , Active smoker . Negative swab for RVP, Covid. Negative UA for legionella .     Suggestion:  - Pat is moderate pulmonary risk for proposed hip surgery  - Pat is medically optimize from pulmonary point of view    - O2 saturation 94% room air at rest . So far no SOB , room air . Can have Oxygen supplementation 2L NC if needed  .  - Continue Duoneb via nebulization Q 6 hours . Do not give at the same time with PRN Albuterol 90 mcg 2 puffs Q 6 Hours.   - Continue Symbicort 160- 4.5 mcg 2 puffs Twice daily. using Wixela Twice daily outpatient .   - On Zosyn 3.375 Gm IVPB Q 8 Hours .  - Orthopedic following .   - Reinforced the importance of smoking cessation  ( is a smoker too ) , may not quit but willing to continue  the Nicotine patch   - On nicotine patch on x 12 hours.   - She has moderate perioperative risk with Ariscat pulmonary assessment .  - Consider ICU eval post op for air way monitoring   - ABG pre-op

## 2024-04-16 NOTE — PROGRESS NOTE ADULT - PROBLEM SELECTOR PLAN 2
hx of COPD not on home O2, and current smoker  -CT chest shows Loss of height of multiple thoracic and lumbar spine vertebral bodies which are of indeterminate age. Dominant cluster of nodular and patchy opacities within the superior segment of the right lower lobe suggestive of infection with endobronchial spread. Emphysema  -cont Prednisone taper   -cont Albuterol PRN, Duonebs and Wixela (patient's home med)  -supplemental oxygen PRN   -Pulm Dr Villatoro following - She has moderate perioperative risk with Ariscat pulmonary assessment, Post OP ICU eval recommended by Pulm

## 2024-04-16 NOTE — PROGRESS NOTE ADULT - SUBJECTIVE AND OBJECTIVE BOX
ROXY FAN  MRN-[id number]      Diagnosis:  L Hip basicervical Fracture     73yFemale    Patient was seen and evaluated at bedside. Patient with no acute complaints.   Pain is well controlled to the LLE. Pt now states she would like to have surgery to the left hip.   Denies CP/SOB, dyspnea, paresthesias, N/V/D, palpitations.     Vital Signs Last 24 Hrs  T(C): 36.8 (16 Apr 2024 05:12), Max: 37.1 (15 Apr 2024 13:10)  T(F): 98.2 (16 Apr 2024 05:12), Max: 98.8 (15 Apr 2024 13:10)  HR: 98 (16 Apr 2024 05:12) (87 - 98)  BP: 150/93 (16 Apr 2024 05:12) (134/86 - 165/90)  BP(mean): --  RR: 19 (16 Apr 2024 05:12) (18 - 19)  SpO2: 97% (16 Apr 2024 05:12) (93% - 97%)    Parameters below as of 16 Apr 2024 05:12  Patient On (Oxygen Delivery Method): nasal cannula  O2 Flow (L/min): 2    I&O's Detail    15 Apr 2024 07:01  -  16 Apr 2024 07:00  --------------------------------------------------------  IN:  Total IN: 0 mL    OUT:    Indwelling Catheter - Urethral (mL): 1700 mL  Total OUT: 1700 mL    Total NET: -1700 mL    Physical Exam:    General: AAOx3, NAD, resting comfortably in bed.    L Hip: Skin is pink and warm. Tender at the fracture site. Decreased ROM of the affected hip due to pain. SILT.  Positive logroll test.  Lower extremity:  No calf tenderness, calves are soft. 2+pulses. NVI. (+)EHL/FHL/ADF/APF.  Good capillary refill. Warm, well-perfused. SILT.                          12.4   9.29  )-----------( 346      ( 16 Apr 2024 07:06 )             37.8     04-16    137  |  98  |  12  ----------------------------<  109<H>  3.3<L>   |  32<H>  |  0.44<L>    Ca    8.8      16 Apr 2024 07:06  Phos  3.2     04-16  Mg     2.5     04-16    TPro  6.9  /  Alb  2.9<L>  /  TBili  0.4  /  DBili  x   /  AST  13  /  ALT  18  /  AlkPhos  121<H>  04-16      Urinalysis Basic - ( 16 Apr 2024 07:06 )    Color: x / Appearance: x / SG: x / pH: x  Gluc: 109 mg/dL / Ketone: x  / Bili: x / Urobili: x   Blood: x / Protein: x / Nitrite: x   Leuk Esterase: x / RBC: x / WBC x   Sq Epi: x / Non Sq Epi: x / Bacteria: x          Impression:  72 y/o Female with L Hip Basicervical Fracture    Plan:  -  Case and plan d/w patient and  at the bedside who now agree to agree- R/B/A discussed for a left hip IM Nailing- pt and family understand and agree with the plan  -  Pain control  -  DVT prophylaxis with Venodynes  -  Keep NPO- last ate toast and coffee at 9AM  -  Surgeon:  Dr. Lang  -  For Surgery tonight  -  Medically optimized as per medical team  -  NWB to LLE  -  Consent: Verbal consent obtain- written consent to be obtained by surgeon  -  Case d/w Dr. Lang and medical team

## 2024-04-16 NOTE — PROGRESS NOTE ADULT - PROBLEM SELECTOR PLAN 4
Monitor BP  pain control  c/w current treatment Monitor BP  pain control  c/w current treatment  Cardiology onboard - Dr. Salinas

## 2024-04-16 NOTE — PROGRESS NOTE ADULT - PROBLEM SELECTOR PLAN 9
now resolved   -imaging negative for acute findings  -MR head and spine with no acute findings   -Neuro Dr Keyes followed.

## 2024-04-16 NOTE — PROGRESS NOTE ADULT - ASSESSMENT
TTE W or WO Ultrasound Enhancing Agent (04.15.24 @ 16:12) >  CONCLUSIONS:      1. Left ventricular cavity is normal in size. Left ventricular systolic function is hyperdynamic. There are no regional wall motion abnormalities seen.   2. There is mild (grade 1) left ventricular diastolic dysfunction.   3. Normal right ventricular cavity size, with normal wall thickness, and normal systolic function. Tricuspid annular plane systolic excursion (TAPSE) is 2.4 cm (normal >=1.7 cm).   4. The left atrium is mildly dilated.  5. The right atrium is normal in size.   6. Estimated pulmonary artery systolic pressure is 29 mmHg, consistent with normal pulmonary artery pressure.   7. Mild left ventricular hypertrophy.   8. Technically difficult image quality.   9. There is calcification of the mitral valve annulus.  10. There is increased LV mass and concentric hypertrophy.  11. No pericardial effusion seen.  12. No prior echocardiogram is available for comparison.    ___________________________________________________________________          73-year-old female from home ambulates with a walker at baseline with PMHx of HTN, COPD (not on O2 at home), current smoker with 60 ppy hx, hypothyroidism, spinal stenosis, urinary incontinence, presenting with left-sided lower back and hip pain. CTH and cervical spine shows No gross acute intracranial hemorrhage.  CT pelvis shows acute proximal left femoral fracture. Pt is admitted for L hip fracture and COPD exacerbation 2/2 PNA.          respiratory failure with hypoxia.   ·  Plan: hx of COPD not on home O2, and current smoker  -CT chest shows Loss of height of multiple thoracic and lumbar spine vertebral bodies which are of indeterminate age. Dominant cluster of nodular and patchy opacities within the superior segment of the right lower lobe suggestive of infection with endobronchial spread. Emphysema  -cont Prednisone taper   -cont Albuterol PRN, Duonebs and Wixela (patient's home med)     HTN (hypertension).   ·  Plan: Monitor BP    # Hip fracture, left.   ·  Plan: -s/p fall  -CT pelvis shows acute proximal left femoral fracture    - Overall this patient is at intermediate risk (for cardiac death, nonfatal myocardial infarction, and nonfatal cardiac arrest perioperatively for this         intermediate risk procedure). The patient is however without evidence of ACS, Decompensated Heart Failure, Obstructive Valvular Heart disease or Unstable arrhythmia.   There  are  no further recommendation for risk stratifying imaging/stress testing prior to planned surgery

## 2024-04-16 NOTE — PROGRESS NOTE ADULT - PROBLEM SELECTOR PLAN 1
-s/p fall  -CT pelvis shows acute proximal left femoral fracture  -Ortho following -plan for left hip IM nailing today (6p)  NPO

## 2024-04-16 NOTE — PROGRESS NOTE ADULT - ASSESSMENT
Patient is a 73y old  Female who is from home ambulates with a walker at baseline with PMH of HTN, COPD ( not on O2 at home), current smoker with 60 ppy hx, hypothyroidism, spinal stenosis urinary incontinence, now presents to the ER for evaluation of left-sided lower back and hip pain after fall 2 weeks ago. Approximately 1-2 weeks ago the patient fell while walking and her leg "gave out" and since then she has had left-sided leg pain.  Patient reports she is able to walk with her cane/walker but feels very weak and unbalanced and has since fallen 2 more times. Pt also complains of SOB and chest pain since the fall today after her cane hit her chest.  On admission, she has no fever but found to be hypoxic on RA to 80s saturating well on 1-2 L NC. The CT chest shows Dominant cluster of nodular and patchy opacities within the superior segment of the right lower lobe suggestive of infection with endobronchial spread. Emphysema. Intrahepatic and extrahepatic biliary ductal dilatation is of unclear etiology. CT pelvis shows Acute proximal left femoral fracture,  Age-indeterminate severe compression fracture of the L5 vertebral body. Pt is admitted for L hip fracture/ SOB 2/2 PNA vs COPD exacerbation. She has started on Zosyn and the ID consult requested to assist with further evaluation and antibiotic management.    # Pneumonia - Legionella urine Ag and MRSA PCR is negative   # Left femoral fracture, s/p Fall- Surgery on 4/11 as per Ortho, but was not done since patient is not optimized yet  # positive Adenovirus     would recommend :    1. NPO for OR today, 4/16  2, Supportive care for Adenovirus  3. Aspiration precaution  4. Continue Zosyn  X one more day , until 4/17  5. Pain management as needed    d/w Covering Damien BREWSTER, patient and family at the bed side     Attending Attestation:    Spent more than 35 minutes on total encounter, more than 50 % of the visit was spent counseling and/or coordinating care by the Attending physician.

## 2024-04-16 NOTE — PROGRESS NOTE ADULT - ASSESSMENT
Confidential Drug Utilization Report  Search Terms: ROXY VASQUEZ, 1950   Search Date: 04/13/2024 16:10:57 PM  The Drug Utilization Report below displays all of the controlled substance prescriptions, if any, that your patient has filled in the last twelve months. The information displayed on this report is compiled from pharmacy submissions to the Department, and accurately reflects the information as submitted by the pharmacies.    This report was requested by: Olya White | Reference #: [id number]    You have not added a TIM number. Keeping your TIM number(s) up to date on the My TIM # tab will enable the separation of your prescriptions from others in the search results.    Practitioner Count: 2  Pharmacy Count: 1  Current Opioid Prescriptions: 2  Current Benzodiazepine Prescriptions: 0  Current Stimulant Prescriptions: 0      Patient Demographic Information (PDI)       PDI	First Name	Last Name	Birth Date	Gender	Street Address	[facility]	Zip Code  OSVALDO Vasquez	1950	Female	[phone]	[facility]	[id number]    Prescription Information      PDI Filter:    PDI	Current Rx	Drug Type	Rx Written	Rx Dispensed	Drug	Quantity	Days Supply	Prescriber Name	Prescriber TIM #  A	Y	O	03/27/2024	03/30/2024	oxycodone-acetaminophen  mg tab	150	30	SheRanjith silver	[id number]  Payment Method Cash  [facility]  A	Y	O	03/27/2024	03/28/2024	morphine sulf er 60 mg tablet	60	30	SheflRanjith sebastian	[id number]  Payment Method Cash  [facility]  A	N	B	03/27/2024	03/27/2024	alprazolam 1 mg tablet	60	15	SheRanjith silver	[id number]  Payment Method Insurance  [facility]  A	N	O	02/26/2024	03/01/2024	oxycodone-acetaminophen  mg tablet	150	30	SheRanjith silver	[id number]  Payment Method Abdi  [facility]  A	N	B	02/26/2024	02/28/2024	alprazolam 1 mg tablet	60	15	SheRanjith silver	[id number]  Payment Method Insurance  Disp[facility]  A	N	O	02/26/2024	02/28/2024	morphine sulf er 60 mg tablet	60	30	SheflRanjith sebastian	[id number]  [facility] Method Cash  [facility]  A	N	B	01/29/2024	02/01/2024	alprazolam 1 mg tablet	60	30	SheflRanjith sebastian	[id number]  Payment Method Insurance  [facility]  A	N	O	01/29/2024	02/01/2024	oxycodone-acetaminophen  mg tab	150	30	SheflinRanjith	[id number]  Payment Method Cash  [facility]  A	N	B	01/02/2024	01/02/2024	alprazolam 1 mg tablet	60	30	Sheflin, Ranjith CASIANO	[id number]  Payment Method Insurance  [facility]  A	N	O	01/02/2024	01/02/2024	oxycodone-acetaminophen  mg tab	150	30	Sheflin, Ranjith CASIANO	[id number]  Payment Method Cash  [facility]  A	N	S	11/27/2023	11/29/2023	dextroamp-amphetamin 20 mg tab	60	30	SheflinRanjith	[id number]  Payment Method Insurance  [facility]  [facility]	N	O	11/27/2023	11/29/2023	morphine sulf er 60 mg tablet	60	30	Sheflin, Ranjith CASIANO	[id number]  Payment Method Cash  [facility]  A	N	O	11/27/2023	11/29/2023	oxycodone-acetaminophen  mg tab	150	30	SheflinRanjith	[id number]  Payment Method Insurance  [facility]  A	N	B	11/08/2023	11/27/2023	alprazolam 0.25 mg tablet	2	1	Benny Lundberg B, DO	[id number]  Payment Method Insurance  [facility]  A	N	B	11/27/2023	11/27/2023	alprazolam 1 mg tablet	60	15	SheflinRanjith	[id number]  Payment Method Insurance  [facility]  A	N	B	10/30/2023	10/31/2023	alprazolam 1 mg tablet	60	15	SheflinRanjith	[id number]  Payment Method Insurance  [facility]  A	N	S	10/30/2023	10/31/2023	dextroamp-amphetamin 20 mg tab	60	30	Sheflin, Ranjith CASIANO	[id number]  Payment Method Insurance  [facility]  A	N	O	10/30/2023	10/31/2023	morphine sulf er 60 mg tablet	60	30	Sheflin, Ranjith CASIANO	[id number]  Payment Method Cash  [facility]  A	N	O	10/30/2023	10/31/2023	oxycodone-acetaminophen  mg tab	150	30	Sheflin, Ranjith CASIANO	[id number]  Payment Method Insurance  [facility]  A	N	B	10/02/2023	10/02/2023	alprazolam 1 mg tablet	60	15	SheflinRanjith	[id number]  Payment Method Insurance  [facility]  A	N	O	10/02/2023	10/02/2023	oxycodone-acetaminophen  mg tab	150	30	SheflRanjith sebastian	[id number]  Payment Method Insurance  [facility]  A	N	O	09/01/2023	09/30/2023	oxycodone-acetaminophen  mg tab	150	30	SheflinRanjith	[id number]  Payment Method Insurance  [facility]  A	N	B	09/01/2023	09/30/2023	alprazolam 1 mg tablet	60	15	Sheflin, Ranjith CASIANO	[id number]  Payment Method Insurance  [facility]  A	N	O	09/01/2023	09/30/2023	morphine sulf er 60 mg tablet	60	30	Sheflin, Ranjith CASIANO	[id number]  Payment Method Cash  [facility]  A	N	S	09/01/2023	09/30/2023	dextroamp-amphetamin 20 mg tab	60	30	SheflinRanjith	[id number]  Payment Method Insurance  [facility]  A	N	B	07/31/2023	08/04/2023	alprazolam 1 mg tablet	60	15	SheflinRanjith	[id number]  Payment Method Insurance  [facility]  A	N	O	07/31/2023	08/04/2023	oxycodone-acetaminophen  mg tab	150	30	Sheflin, Ranjith	[id number]  Payment Method Insurance  [facility]  A	N	S	07/31/2023	08/01/2023	dextroamp-amphetamin 20 mg tab	60	30	Sheflin, Ranjith	[id number]  Payment Method Insurance  [facility]  A	N	O	07/31/2023	08/01/2023	morphine sulf er 60 mg tablet	60	30	Sheflin, Ranjith	[id number]  Payment Method Abdi  [facility]  A	N	B	07/01/2023	07/05/2023	alprazolam 1 mg tablet	60	15	Sheflin, Ranjith CASIANO	[id number]  Payment Method Insurance  [facility]  A	N	O	07/01/2023	07/05/2023	oxycodone-acetaminophen  mg tab	150	30	Sheflin, Ranjith CASIANO	[id number]  Payment Method Insurance  [facility]  A	N	S	05/05/2023	06/05/2023	dextroamp-amphetamin 20 mg tab	60	30	Sheflin, Ranjith	[id number]  Payment Method Insurance  [facility]  A	N	B	06/05/2023	06/05/2023	alprazolam 1 mg tablet	60	30	Sheflin, Ranjith	[id number]  Payment Method Insurance  [facility]  A	N	O	06/05/2023	06/05/2023	oxycodone-acetaminophen  mg tab	150	30	Sheflin, Ranjith	[id number]  Payment Method Insurance  [facility]  A	N	S	04/05/2023	05/05/2023	dextroamp-amphetamin 20 mg tab	60	30	Sheflin, Ranjith	[id number]  Payment Method Insurance  [facility]  A	N	B	05/05/2023	05/05/2023	alprazolam 1 mg tablet	60	15	Sheflin, Ranjith	[id number]  Payment Method Insurance  [facility]  A	N	O	05/05/2023	05/05/2023	oxycodone-acetaminophen  mg tab	150	30	Sheflin, Ranjith CASIANO	[id number]  Payment Method Insurance  [facility]    * - Details of Drug Type : O = Opioid, B = Benzodiazepine, S = Stimulant    * - Drugs marked with an asterisk are compound drugs. If the compound drug is made up of more than one controlled substance, then each controlled substance will be a separate row in the table.

## 2024-04-16 NOTE — PROGRESS NOTE ADULT - ASSESSMENT
73-year-old female from home ambulates with a walker at baseline with PMHx of HTN, COPD (not on O2 at home), current smoker with 60 ppy hx, hypothyroidism, spinal stenosis, urinary incontinence, presenting with left-sided lower back and hip pain. CTH and cervical spine shows No gross acute intracranial hemorrhage.  CT pelvis shows acute proximal left femoral fracture. Pt is admitted for L hip fracture and COPD exacerbation 2/2 PNA.

## 2024-04-16 NOTE — PROGRESS NOTE ADULT - PROBLEM SELECTOR PLAN 6
-hx of spinal stenosis takes morphine and percocet at home  -cont  pain regimen  -Pain mgmt following.

## 2024-04-16 NOTE — PROGRESS NOTE ADULT - PROBLEM SELECTOR PLAN 1
Pt with acute left hip pain which is somatic and neuropathic in nature due to left femoral fracture. Pt also endorses chronic low back pain - CTAP 4/11 demonstrated moderate to severe L1, L2, L4 and L5 compression fractures. CT pelvis shows acute proximal left femoral fracture, Ortho following, patient agreed to left hip IM nailing later today 4/16. Hx of chronic opioid use. High risk medications reviewed. Avoid polypharmacy. Avoid IV opioids. Avoid NSAIDs. Non-pharmacological sleep aides maintained Non-opioid medications and non-pharmacological pain management measures maintained.   Opioid pain recommendations   - Continue Oxycodone 5-10 mg PO q 4 hours PRN moderate-severe pain. Monitor for sedation/ respiratory depression.   - Continue Morphine ER 15 mg po q12h (home dose 60mg BID- start low dose and titrate appropriately) if no contraindications).  Non-opioid pain recommendations   - Continue Acetaminophen 1 gram PO q 8 hours PRN for mild pain. Monitor LFTs  - Discontinue Gabapentin patient states she does not want that medication   - Continue Lidocaine 4% patches.  - Continue Flexeril 5 mg po q8h PRN Monitor for increased somnolence, dizziness.  Bowel Regimen  - Continue Miralax 17G PO daily  - Continue Senna 2 tablets at bedtime for constipation  Mild pain   - Non-pharmacological pain treatment recommendations  - Warm/ Cool packs PRN   - Repositioning, guided imagery, relaxation, distraction.  - Physical therapy OOB if no contraindications   Recommendations discussed with primary team and RN.

## 2024-04-16 NOTE — PROGRESS NOTE ADULT - SUBJECTIVE AND OBJECTIVE BOX
Patient is seen and examined at the bed side, is afebrile. The RVP detected Adenovirus, but she is saturating 95% in Room air.  And scheduled for IM nailing today, 4/16/24.      REVIEW OF SYSTEMS: All other review systems are negative      ALLERGIES: No Known Allergies      Vital Signs Last 24 Hrs  T(C): 36.7 (16 Apr 2024 14:43), Max: 36.8 (16 Apr 2024 05:12)  T(F): 98.1 (16 Apr 2024 14:43), Max: 98.2 (16 Apr 2024 05:12)  HR: 84 (16 Apr 2024 14:43) (84 - 98)  BP: 136/76 (16 Apr 2024 14:43) (134/86 - 165/90)  BP(mean): --  RR: 20 (16 Apr 2024 14:43) (18 - 20)  SpO2: 92% (16 Apr 2024 14:43) (92% - 97%)    Parameters below as of 16 Apr 2024 14:43  Patient On (Oxygen Delivery Method): nasal cannula  O2 Flow (L/min): 2      PHYSICAL EXAM:  GENERAL: Not in distress, back on oxygen via NC   CHEST/LUNG: Not using accessory muscles   HEART: s1 and s2 present  ABDOMEN:  Nontender and  Nondistended  EXTREMITIES: No pedal  edema  CNS: Awake and Alert      LABS:                        12.4   9.29  )-----------( 346      ( 16 Apr 2024 07:06 )             37.8                           12.1   10.01 )-----------( 282      ( 15 Apr 2024 07:00 )             37.0          04-16    137  |  98  |  12  ----------------------------<  109<H>  3.3<L>   |  32<H>  |  0.44<L>    Ca    8.8      16 Apr 2024 07:06  Phos  3.2     04-16  Mg     2.5     04-16    TPro  6.9  /  Alb  2.9<L>  /  TBili  0.4  /  DBili  x   /  AST  13  /  ALT  18  /  AlkPhos  121<H>  04-16            04-15    136  |  97  |  12  ----------------------------<  115<H>  3.5   |  32<H>  |  0.44<L>    Ca    8.6      15 Apr 2024 07:00  Phos  3.4     04-15  Mg     2.5     04-15    TPro  6.5  /  Alb  2.7<L>  /  TBili  0.5  /  DBili  x   /  AST  16  /  ALT  19  /  AlkPhos  113  04-15  PT/INR - ( 10 Apr 2024 05:10 )   PT: 10.9 sec;   INR: 0.95 ratio       PTT - ( 10 Apr 2024 05:10 )  PTT:29.3 sec      MEDICATIONS  (STANDING):    albuterol/ipratropium for Nebulization 3 milliLiter(s) Nebulizer every 6 hours  chlorhexidine 2% Cloths 1 Application(s) Topical <User Schedule>  levothyroxine 112 MICROGram(s) Oral daily  lidocaine   4% Patch 2 Patch Transdermal daily  losartan 50 milliGRAM(s) Oral daily  morphine ER Tablet 15 milliGRAM(s) Oral every 12 hours  naloxone Injectable 0.4 milliGRAM(s) IV Push once  nicotine -   7 mG/24Hr(s) Patch 1 Patch Transdermal daily  piperacillin/tazobactam IVPB.. 3.375 Gram(s) IV Intermittent every 8 hours  polyethylene glycol 3350 17 Gram(s) Oral daily  predniSONE   Tablet 20 milliGRAM(s) Oral daily  senna 2 Tablet(s) Oral at bedtime  sodium chloride 0.9%. 1000 milliLiter(s) (60 mL/Hr) IV Continuous <Continuous>        RADIOLOGY & ADDITIONAL TESTS:    4/9/24 : CT Chest No Cont (04.09.24 @ 17:06) Axial CT images of the chest are obtained without intravenous administration of contrast.      IMPRESSION: Loss of height of multiple thoracic and lumbar spine vertebral bodies which are of indeterminate age. Clinical correlation for   back pain is recommended. Dedicated spinal MRI can be performed for complete evaluation.    Dominant cluster of nodular and patchy opacities within the superior segment of the right lower lobe suggestive of infection with   endobronchial spread. A 1-3 month follow-up noncontrast chest CT is recommended to ensure improvement/resolution.    Emphysema.    Intrahepatic and extrahepatic biliary ductal dilatation is of unclear etiology. Upper abdominal ultrasound can be performedfor further   evaluation as clinically warranted.      4/10/24: CT Head No Cont (04.10.24 @ 15:55) IMPRESSION: Age-appropriate involutional and ischemic gliotic changes. No   hemorrhage. No change since 4/9/2024.      4/9/24 : Xray Femur 2 Views, Left (04.09.24 @ 20:35) 1. Diffuse bony demineralization associated with a fracture along the   intertrochanteric line of the proximal left femur, with only slight upward migration of the distal segment, but less than one half bone width.  2. Degenerative arthropathy of the left knee.    4/9/24: Xray Pelvis AP only (04.09.24 @ 20:35) 1. Diffuse bony demineralization with a fracture along the   intertrochanteric line of the proximal left femur. No dislocation of the hips.        MICROBIOLOGY DATA:  Respiratory Viral Panel with COVID-19 by SHAHANA (04.16.24 @ 15:15)   Rapid RVP Result: Detected  SARS-CoV-2: NotDetec:    MRSA/MSSA PCR (04.11.24 @ 07:02)   MRSA PCR Result.: NotDetec:    Legionella pneumophila Antigen, Urine (04.10.24 @ 05:40)   Legionella Antigen, Urine: Negative:      Respiratory Viral Panel with COVID-19 by SHAHANA (04.09.24 @ 21:24)   Rapid RVP Result: NotDetec  SARS-CoV-2: NotDetec:

## 2024-04-16 NOTE — PROGRESS NOTE ADULT - PROBLEM SELECTOR PLAN 7
-pt states that she lost about 30 lbs in the last 2 months she is undergoing colonoscopy o/p to evaluate for weight loss, family history of colon ca in her father  -tumor markers neg  -Hem/onc Dr Emerson following - Likely PET/CT outpatient, No other planned oncologic workup inpatient at this time.

## 2024-04-17 LAB
ALBUMIN SERPL ELPH-MCNC: 2.8 G/DL — LOW (ref 3.5–5)
ALP SERPL-CCNC: 124 U/L — HIGH (ref 40–120)
ALT FLD-CCNC: 19 U/L DA — SIGNIFICANT CHANGE UP (ref 10–60)
ANION GAP SERPL CALC-SCNC: 7 MMOL/L — SIGNIFICANT CHANGE UP (ref 5–17)
AST SERPL-CCNC: 18 U/L — SIGNIFICANT CHANGE UP (ref 10–40)
BILIRUB SERPL-MCNC: 0.4 MG/DL — SIGNIFICANT CHANGE UP (ref 0.2–1.2)
BUN SERPL-MCNC: 17 MG/DL — SIGNIFICANT CHANGE UP (ref 7–18)
CALCIUM SERPL-MCNC: 8.5 MG/DL — SIGNIFICANT CHANGE UP (ref 8.4–10.5)
CHLORIDE SERPL-SCNC: 99 MMOL/L — SIGNIFICANT CHANGE UP (ref 96–108)
CO2 SERPL-SCNC: 30 MMOL/L — SIGNIFICANT CHANGE UP (ref 22–31)
CREAT SERPL-MCNC: 0.55 MG/DL — SIGNIFICANT CHANGE UP (ref 0.5–1.3)
EGFR: 96 ML/MIN/1.73M2 — SIGNIFICANT CHANGE UP
GLUCOSE SERPL-MCNC: 85 MG/DL — SIGNIFICANT CHANGE UP (ref 70–99)
HCT VFR BLD CALC: 34.8 % — SIGNIFICANT CHANGE UP (ref 34.5–45)
HGB BLD-MCNC: 11.3 G/DL — LOW (ref 11.5–15.5)
MCHC RBC-ENTMCNC: 31.1 PG — SIGNIFICANT CHANGE UP (ref 27–34)
MCHC RBC-ENTMCNC: 32.5 GM/DL — SIGNIFICANT CHANGE UP (ref 32–36)
MCV RBC AUTO: 95.9 FL — SIGNIFICANT CHANGE UP (ref 80–100)
NRBC # BLD: 0 /100 WBCS — SIGNIFICANT CHANGE UP (ref 0–0)
PLATELET # BLD AUTO: 370 K/UL — SIGNIFICANT CHANGE UP (ref 150–400)
POTASSIUM SERPL-MCNC: 3.6 MMOL/L — SIGNIFICANT CHANGE UP (ref 3.5–5.3)
POTASSIUM SERPL-SCNC: 3.6 MMOL/L — SIGNIFICANT CHANGE UP (ref 3.5–5.3)
PROT SERPL-MCNC: 6.3 G/DL — SIGNIFICANT CHANGE UP (ref 6–8.3)
RBC # BLD: 3.63 M/UL — LOW (ref 3.8–5.2)
RBC # FLD: 13.3 % — SIGNIFICANT CHANGE UP (ref 10.3–14.5)
SODIUM SERPL-SCNC: 136 MMOL/L — SIGNIFICANT CHANGE UP (ref 135–145)
WBC # BLD: 12.29 K/UL — HIGH (ref 3.8–10.5)
WBC # FLD AUTO: 12.29 K/UL — HIGH (ref 3.8–10.5)

## 2024-04-17 RX ORDER — KETOROLAC TROMETHAMINE 30 MG/ML
15 SYRINGE (ML) INJECTION ONCE
Refills: 0 | Status: DISCONTINUED | OUTPATIENT
Start: 2024-04-17 | End: 2024-04-17

## 2024-04-17 RX ADMIN — Medication 100 MILLIGRAM(S): at 04:47

## 2024-04-17 RX ADMIN — OXYCODONE HYDROCHLORIDE 10 MILLIGRAM(S): 5 TABLET ORAL at 18:14

## 2024-04-17 RX ADMIN — OXYCODONE HYDROCHLORIDE 5 MILLIGRAM(S): 5 TABLET ORAL at 09:05

## 2024-04-17 RX ADMIN — POLYETHYLENE GLYCOL 3350 17 GRAM(S): 17 POWDER, FOR SOLUTION ORAL at 11:54

## 2024-04-17 RX ADMIN — Medication 15 MILLIGRAM(S): at 20:54

## 2024-04-17 RX ADMIN — Medication 15 MILLIGRAM(S): at 01:57

## 2024-04-17 RX ADMIN — OXYCODONE HYDROCHLORIDE 10 MILLIGRAM(S): 5 TABLET ORAL at 23:28

## 2024-04-17 RX ADMIN — OXYCODONE HYDROCHLORIDE 10 MILLIGRAM(S): 5 TABLET ORAL at 12:22

## 2024-04-17 RX ADMIN — OXYCODONE HYDROCHLORIDE 10 MILLIGRAM(S): 5 TABLET ORAL at 00:00

## 2024-04-17 RX ADMIN — Medication 1000 MILLIGRAM(S): at 00:00

## 2024-04-17 RX ADMIN — LIDOCAINE 2 PATCH: 4 CREAM TOPICAL at 08:02

## 2024-04-17 RX ADMIN — PIPERACILLIN AND TAZOBACTAM 25 GRAM(S): 4; .5 INJECTION, POWDER, LYOPHILIZED, FOR SOLUTION INTRAVENOUS at 21:58

## 2024-04-17 RX ADMIN — OXYCODONE HYDROCHLORIDE 5 MILLIGRAM(S): 5 TABLET ORAL at 14:48

## 2024-04-17 RX ADMIN — LIDOCAINE 2 PATCH: 4 CREAM TOPICAL at 23:00

## 2024-04-17 RX ADMIN — Medication 1000 MILLIGRAM(S): at 09:06

## 2024-04-17 RX ADMIN — OXYCODONE HYDROCHLORIDE 10 MILLIGRAM(S): 5 TABLET ORAL at 18:49

## 2024-04-17 RX ADMIN — LOSARTAN POTASSIUM 50 MILLIGRAM(S): 100 TABLET, FILM COATED ORAL at 05:44

## 2024-04-17 RX ADMIN — OXYCODONE HYDROCHLORIDE 10 MILLIGRAM(S): 5 TABLET ORAL at 06:45

## 2024-04-17 RX ADMIN — Medication 1 MILLIGRAM(S): at 14:12

## 2024-04-17 RX ADMIN — Medication 1 PATCH: at 11:54

## 2024-04-17 RX ADMIN — Medication 5 MILLIGRAM(S): at 11:54

## 2024-04-17 RX ADMIN — Medication 1 PATCH: at 19:00

## 2024-04-17 RX ADMIN — Medication 112 MICROGRAM(S): at 05:28

## 2024-04-17 RX ADMIN — Medication 3 MILLILITER(S): at 20:23

## 2024-04-17 RX ADMIN — OXYCODONE HYDROCHLORIDE 10 MILLIGRAM(S): 5 TABLET ORAL at 22:28

## 2024-04-17 RX ADMIN — Medication 3 MILLILITER(S): at 14:08

## 2024-04-17 RX ADMIN — PIPERACILLIN AND TAZOBACTAM 25 GRAM(S): 4; .5 INJECTION, POWDER, LYOPHILIZED, FOR SOLUTION INTRAVENOUS at 05:44

## 2024-04-17 RX ADMIN — SENNA PLUS 2 TABLET(S): 8.6 TABLET ORAL at 21:58

## 2024-04-17 RX ADMIN — Medication 1 PATCH: at 07:00

## 2024-04-17 RX ADMIN — PIPERACILLIN AND TAZOBACTAM 25 GRAM(S): 4; .5 INJECTION, POWDER, LYOPHILIZED, FOR SOLUTION INTRAVENOUS at 14:02

## 2024-04-17 RX ADMIN — Medication 1 MILLIGRAM(S): at 01:59

## 2024-04-17 RX ADMIN — Medication 15 MILLIGRAM(S): at 02:20

## 2024-04-17 RX ADMIN — LIDOCAINE 2 PATCH: 4 CREAM TOPICAL at 19:00

## 2024-04-17 RX ADMIN — OXYCODONE HYDROCHLORIDE 5 MILLIGRAM(S): 5 TABLET ORAL at 09:35

## 2024-04-17 RX ADMIN — Medication 1 PATCH: at 11:28

## 2024-04-17 RX ADMIN — OXYCODONE HYDROCHLORIDE 10 MILLIGRAM(S): 5 TABLET ORAL at 05:44

## 2024-04-17 RX ADMIN — Medication 100 MILLIGRAM(S): at 12:04

## 2024-04-17 RX ADMIN — OXYCODONE HYDROCHLORIDE 5 MILLIGRAM(S): 5 TABLET ORAL at 14:14

## 2024-04-17 RX ADMIN — LIDOCAINE 2 PATCH: 4 CREAM TOPICAL at 11:26

## 2024-04-17 RX ADMIN — LIDOCAINE 2 PATCH: 4 CREAM TOPICAL at 11:53

## 2024-04-17 RX ADMIN — Medication 15 MILLIGRAM(S): at 21:54

## 2024-04-17 RX ADMIN — Medication 3 MILLILITER(S): at 09:15

## 2024-04-17 RX ADMIN — OXYCODONE HYDROCHLORIDE 10 MILLIGRAM(S): 5 TABLET ORAL at 11:52

## 2024-04-17 RX ADMIN — ENOXAPARIN SODIUM 40 MILLIGRAM(S): 100 INJECTION SUBCUTANEOUS at 09:06

## 2024-04-17 NOTE — PROGRESS NOTE ADULT - SUBJECTIVE AND OBJECTIVE BOX
PATIENT SEEN AND EXAMINED BY SYD TRIPATHI M.D. ON :- 4/17/24  DATE OF SERVICE:     4/17/24        Interim events noted,Labs ,Radiological studies and Cardiology tests reviewed .    Patient is a 73y old  Female who presents with a chief complaint of L hip fracture (17 Apr 2024 19:11)      HPI:  73-year-old female from home ambulates with a walker at baseline with PMH of HTN, COPD ( not on O2 at home), current smoker with 60 ppy hx, hypothyroidism, spinal stenosis urinary incontinence, presenting with left-sided lower back and hip pain after fall 2 weeks ago. Approximately 1-2 weeks ago the patient fell while walking and her leg "gave out" and since then she has had left-sided leg pain.  Patient reports she is able to walk with her cane/walker but feels very weak and unbalanced and has since fallen 2 more times. Pt also complains of SOB. Patient reporting chest pain since the fall today after her cane hit her chest. Patient denies any headache, dizziness, cough, congestion, fever, chills, N,V,D,Abd pain, numbness or tingling. In the ED, pt is comfortable, but complains of pain, NAD, pt is wheezing b/l with R sided crackles, pt was found to be hypoxic on RA to 80s saturating well on 1-2 L NC, hypertensive 175/105, wbc 9, Na 130. CTH and cervical spine shows No gross acute intracranial hemorrhage. No gross acute fracture cervical spine. CTC shows Loss of height of multiple thoracic and lumbar spine vertebral bodies which are of indeterminate age. Dominant cluster of nodular and patchy opacities within the superior segment of the right lower lobe suggestive of infection with endobronchial spread. Emphysema. Intrahepatic and extrahepatic biliary ductal dilatation is of unclear etiology. CT pelvis shows Acute proximal left femoral fracture,  Age-indeterminate severe compression fracture of the L5 vertebral body. Pt is admitted for L hip fracture/ SOB 2/2 PNA vs COPD exacerbation.    Off note: pt states that she lost about 30 lbs in the last 2 months. She is undergoing colonoscopy o/p to evaluate for weight loss. pt denies any blood in the stool. However, she has family history of colon ca in her father.  (09 Apr 2024 19:22)      PAST MEDICAL & SURGICAL HISTORY:  HTN (hypertension)      Hypothyroidism      COPD (chronic obstructive pulmonary disease)      Spinal stenosis      Current smoker      COPD with pneumonia      H/O foot surgery          PREVIOUS DIAGNOSTIC TESTING:      ECHO  FINDINGS:    STRESS  FINDINGS:    CATHETERIZATION  FINDINGS:    MEDICATIONS  (STANDING):  albuterol/ipratropium for Nebulization 3 milliLiter(s) Nebulizer every 6 hours  enoxaparin Injectable 40 milliGRAM(s) SubCutaneous every 24 hours  levothyroxine 112 MICROGram(s) Oral daily  lidocaine   4% Patch 2 Patch Transdermal daily  losartan 50 milliGRAM(s) Oral daily  morphine ER Tablet 15 milliGRAM(s) Oral every 12 hours  naloxone Injectable 0.4 milliGRAM(s) IV Push once  nicotine -   7 mG/24Hr(s) Patch 1 Patch Transdermal daily  piperacillin/tazobactam IVPB.. 3.375 Gram(s) IV Intermittent every 8 hours  polyethylene glycol 3350 17 Gram(s) Oral daily  predniSONE   Tablet 20 milliGRAM(s) Oral daily  senna 2 Tablet(s) Oral at bedtime    MEDICATIONS  (PRN):  acetaminophen     Tablet .. 1000 milliGRAM(s) Oral every 8 hours PRN Mild Pain (1 - 3)  albuterol    90 MICROgram(s) HFA Inhaler 2 Puff(s) Inhalation every 6 hours PRN Bronchospasm  ALPRAZolam 1 milliGRAM(s) Oral every 12 hours PRN Anxiety  benzonatate 100 milliGRAM(s) Oral every 8 hours PRN Cough  bisacodyl 5 milliGRAM(s) Oral daily PRN Constipation  cyclobenzaprine 5 milliGRAM(s) Oral every 8 hours PRN Spasm  oxyCODONE    IR 10 milliGRAM(s) Oral every 4 hours PRN Severe Pain (7 - 10)  oxyCODONE    IR 5 milliGRAM(s) Oral every 4 hours PRN Moderate Pain (4 - 6)      FAMILY HISTORY:  FH: colon cancer (Father)        SOCIAL HISTORY:    CIGARETTES:    ALCOHOL:    REVIEW OF SYSTEMS:  CONSTITUTIONAL: No fever, weight loss, or fatigue  EYES: No eye pain, visual disturbances, or discharge  ENMT:  No difficulty hearing, tinnitus, vertigo; No sinus or throat pain  NECK: No pain or stiffness  RESPIRATORY: No cough, wheezing, chills or hemoptysis; No shortness of breath  CARDIOVASCULAR: No chest pain, palpitations, dizziness, or leg swelling  GASTROINTESTINAL: No abdominal or epigastric pain. No nausea, vomiting, or hematemesis; No diarrhea or constipation. No melena or hematochezia.  GENITOURINARY: No dysuria, frequency, hematuria, or incontinence  NEUROLOGICAL: No headaches, memory loss, loss of strength, numbness, or tremors  SKIN: No itching, burning, rashes, or lesions   LYMPH NODES: No enlarged glands  ENDOCRINE: No heat or cold intolerance; No hair loss  MUSCULOSKELETAL: No joint pain or swelling; No muscle, back, or extremity pain  PSYCHIATRIC: No depression, anxiety, mood swings, or difficulty sleeping  HEME/LYMPH: No easy bruising, or bleeding gums  ALLERY AND IMMUNOLOGIC: No hives or eczema    Vital Signs Last 24 Hrs  T(C): 36.4 (17 Apr 2024 20:27), Max: 36.7 (17 Apr 2024 01:00)  T(F): 97.5 (17 Apr 2024 20:27), Max: 98.1 (17 Apr 2024 01:00)  HR: 108 (17 Apr 2024 20:27) (69 - 108)  BP: 129/83 (17 Apr 2024 20:27) (101/65 - 170/107)  BP(mean): 93 (17 Apr 2024 13:30) (77 - 94)  RR: 18 (17 Apr 2024 20:27) (17 - 26)  SpO2: 96% (17 Apr 2024 20:27) (90% - 100%)    Parameters below as of 17 Apr 2024 20:27  Patient On (Oxygen Delivery Method): nasal cannula  O2 Flow (L/min): 2        PHYSICAL EXAM:  GENERAL: NAD, well-groomed, well-developed  HEAD:  Atraumatic, Normocephalic  EYES: EOMI, PERRLA, conjunctiva and sclera clear  ENMT: No tonsillar erythema, exudates, or enlargement; Moist mucous membranes, Good dentition, No lesions  NECK: Supple, No JVD, Normal thyroid  NERVOUS SYSTEM:  Alert & Oriented X3, Good concentration; Motor Strength 5/5 B/L upper and lower extremities; DTRs 2+ intact and symmetric  CHEST/LUNG: Clear to percussion bilaterally; No rales, rhonchi, wheezing, or rubs  HEART: Regular rate and rhythm; No murmurs, rubs, or gallops  ABDOMEN: Soft, Nontender, Nondistended; Bowel sounds present  EXTREMITIES:  2+ Peripheral Pulses, No clubbing, cyanosis, or edema  LYMPH: No lymphadenopathy noted  SKIN: No rashes or lesions      INTERPRETATION OF TELEMETRY:    ECG:    CHADSVASC:     LABS:                        11.3   12.29 )-----------( 370      ( 17 Apr 2024 06:45 )             34.8     04-17    136  |  99  |  17  ----------------------------<  85  3.6   |  30  |  0.55    Ca    8.5      17 Apr 2024 06:45  Phos  3.2     04-16  Mg     2.5     04-16    TPro  6.3  /  Alb  2.8<L>  /  TBili  0.4  /  DBili  x   /  AST  18  /  ALT  19  /  AlkPhos  124<H>  04-17          Urinalysis Basic - ( 17 Apr 2024 06:45 )    Color: x / Appearance: x / SG: x / pH: x  Gluc: 85 mg/dL / Ketone: x  / Bili: x / Urobili: x   Blood: x / Protein: x / Nitrite: x   Leuk Esterase: x / RBC: x / WBC x   Sq Epi: x / Non Sq Epi: x / Bacteria: x      Lipid Panel:   I&O's Summary    16 Apr 2024 07:01  -  17 Apr 2024 07:00  --------------------------------------------------------  IN: 0 mL / OUT: 1075 mL / NET: -1075 mL        RADIOLOGY & ADDITIONAL STUDIES:

## 2024-04-17 NOTE — PROGRESS NOTE ADULT - ASSESSMENT
73-year-old female from home ambulates with a walker at baseline with PMHx of HTN, COPD (not on O2 at home), current smoker with 60 ppy hx, hypothyroidism, spinal stenosis, urinary incontinence, presenting with left-sided lower back and hip pain. CTH and cervical spine shows No gross acute intracranial hemorrhage.  CT pelvis shows acute proximal left femoral fracture. Pt is admitted for L hip fracture and COPD exacerbation 2/2 PNA.          respiratory failure with hypoxia.   ·  Plan: hx of COPD not on home O2, and current smoker  -CT chest shows Loss of height of multiple thoracic and lumbar spine vertebral bodies which are of indeterminate age. Dominant cluster of nodular and patchy opacities within the superior segment of the right lower lobe suggestive of infection with endobronchial spread. Emphysema  -cont Prednisone taper   -cont Albuterol PRN, Duonebs and Wixela (patient's home med)     HTN (hypertension).   ·  Plan: Monitor BP    # Hip fracture, left.   ·  Plan: -s/p fall  -CT pelvis shows acute proximal left femoral fracture  - s/p IM nailing

## 2024-04-17 NOTE — PROGRESS NOTE ADULT - SUBJECTIVE AND OBJECTIVE BOX
Patient is a 73y old  Female who presents with a chief complaint of Fracture of unspecified part of neck of unspecified femur, initial encounter for closed fracture     (17 Apr 2024       INTERVAL HPI/OVERNIGHT EVENTS: pt seen at bedside with no new complaints    MEDICATIONS  (STANDING):  albuterol/ipratropium for Nebulization 3 milliLiter(s) Nebulizer every 6 hours  enoxaparin Injectable 40 milliGRAM(s) SubCutaneous every 24 hours  levothyroxine 112 MICROGram(s) Oral daily  lidocaine   4% Patch 2 Patch Transdermal daily  losartan 50 milliGRAM(s) Oral daily  morphine ER Tablet 15 milliGRAM(s) Oral every 12 hours  naloxone Injectable 0.4 milliGRAM(s) IV Push once  nicotine -   7 mG/24Hr(s) Patch 1 Patch Transdermal daily  piperacillin/tazobactam IVPB.. 3.375 Gram(s) IV Intermittent every 8 hours  polyethylene glycol 3350 17 Gram(s) Oral daily  predniSONE   Tablet 20 milliGRAM(s) Oral daily  senna 2 Tablet(s) Oral at bedtime    MEDICATIONS  (PRN):  acetaminophen     Tablet .. 1000 milliGRAM(s) Oral every 8 hours PRN Mild Pain (1 - 3)  albuterol    90 MICROgram(s) HFA Inhaler 2 Puff(s) Inhalation every 6 hours PRN Bronchospasm  ALPRAZolam 1 milliGRAM(s) Oral every 12 hours PRN Anxiety  benzonatate 100 milliGRAM(s) Oral every 8 hours PRN Cough  bisacodyl 5 milliGRAM(s) Oral daily PRN Constipation  cyclobenzaprine 5 milliGRAM(s) Oral every 8 hours PRN Spasm  oxyCODONE    IR 5 milliGRAM(s) Oral every 4 hours PRN Moderate Pain (4 - 6)  oxyCODONE    IR 10 milliGRAM(s) Oral every 4 hours PRN Severe Pain (7 - 10)  __________________________________________________  REVIEW OF SYSTEMS:    CONSTITUTIONAL: No fever,   EYES: no acute visual disturbances  NECK: No pain or stiffness  RESPIRATORY: No cough; No shortness of breath  CARDIOVASCULAR: No chest pain, no palpitations  GASTROINTESTINAL: No pain. No nausea or vomiting; No diarrhea   NEUROLOGICAL: No headache or numbness, no tremors  MUSCULOSKELETAL: No joint pain, no muscle pain  GENITOURINARY: no dysuria, no frequency, no hesitancy  PSYCHIATRY: no depression , no anxiety  ALL OTHER  ROS negative      Vital Signs Last 24 Hrs  T(C): 36.5 (17 Apr 2024 13:30), Max: 36.7 (17 Apr 2024 01:00)  T(F): 97.7 (17 Apr 2024 13:30), Max: 98.1 (17 Apr 2024 01:00)  HR: 93 (17 Apr 2024 13:30) (69 - 105)  BP: 128/92 (17 Apr 2024 13:30) (101/65 - 170/107)  BP(mean): 93 (17 Apr 2024 13:30) (77 - 94)  RR: 20 (17 Apr 2024 13:30) (17 - 26)  SpO2: 95% (17 Apr 2024 13:30) (90% - 100%)    Parameters below as of 17 Apr 2024 13:30  Patient On (Oxygen Delivery Method): nasal cannula  O2 Flow (L/min): 2  ________________________________________________  PHYSICAL EXAM:  GENERAL: NAD  HEENT: Normocephalic;  conjunctivae and sclerae clear; moist mucous membranes;   NECK : supple  CHEST/LUNG: Clear to auscultation bilaterally with good air entry   HEART: S1 S2  regular; no murmurs, gallops or rubs  ABDOMEN: Soft, Nontender, Nondistended; Bowel sounds present  EXTREMITIES: no cyanosis; no edema; no calf tenderness, +pedal pulses  SKIN: warm and dry; surgical incision to left hip-- dressing dry and intact  NERVOUS SYSTEM:  Awake and alert; Oriented  to place, person and time ; no new deficits    _________________________________________________  LABS:                        11.3   12.29 )-----------( 370      ( 17 Apr 2024 06:45 )             34.8     04-17    136  |  99  |  17  ----------------------------<  85  3.6   |  30  |  0.55    Ca    8.5      17 Apr 2024 06:45  Phos  3.2     04-16  Mg     2.5     04-16    TPro  6.3  /  Alb  2.8<L>  /  TBili  0.4  /  DBili  x   /  AST  18  /  ALT  19  /  AlkPhos  124<H>  04-17    Urinalysis Basic - ( 17 Apr 2024 06:45 )    Color: x / Appearance: x / SG: x / pH: x  Gluc: 85 mg/dL / Ketone: x  / Bili: x / Urobili: x   Blood: x / Protein: x / Nitrite: x   Leuk Esterase: x / RBC: x / WBC x   Sq Epi: x / Non Sq Epi: x / Bacteria: x    RADIOLOGY & ADDITIONAL TESTS:   < from: MR Thoracic Spine No Cont (04.11.24 @ 19:15) >  PROCEDURE DATE:  04/11/2024          INTERPRETATION:  Three examinations were performed in this patient:  1.  MR of the brain without gadolinium contrast  2.  MR cervical spine without gadolinium contrast  3.  MR thoracic spine without gadolinium contrast    CLINICAL INFORMATION: Post fall trauma cord compression  HMR  Admitting   Dxs: S72.009A FRACTURE OF UNSP PART OF NECK OF UNSP FEMUR, INIT    TECHNIQUE:  1. MR brain:  Sagittal and axial T1-weighted images, axial FLAIR images,   axial susceptibility weighted images, axial T2-weighted images and axial   diffusion weighted images of the brain were obtained.  2.  MR cervical spine:   Sagittal T2-weighted images were obtained.  3.  MR thoracic spine:   Sagittal T2-weighted images were obtained.  The remainder these examinations could not be completed, not tolerated by   the patient. The images that could be obtained are degraded by patient   motion.  CONTRAST:    None    COMPARISON:   CT head 4/10/2023 and CT cervical and CT chest 4/9/2023    FINDINGS:    BRAIN    BRAIN:   The brain demonstrates small focal lesions of remote deep   infarction. The most conspicuous are noted in the external capsules. No   cerebral cortical lesion is recognized.   No diffusion restriction is   found in the brain.  No acute cerebral cortical infarct is found.   No   intracranial hemorrhage is recognized.  No mass effect is found in the   brain.   The brain also demonstrates patchy indistinct lesions within the   deep cerebral hemispheric white matter typical for ischemic white matter   disease.    CSF SPACES:   The ventricles, sulci and basal cisterns appear moderately   dilated reflecting diffuse brain volume loss.    VESSELS:   The vertebral and internal carotid arteries demonstrate   expected flow voids indicating their patency.    HEAD AND NECK STRUCTURES:   The orbits are unremarkable.  Paranasal   sinuses are clear.  The nasal cavity demonstrates left-to-right septal   deviation. Left middle turbinate tristen bullosa is present..  The central   skull base appears intact.  The nasopharynx is symmetric.  The temporal   bones appear clear of disease.  The calvarium appears unremarkable.    CERVICAL SPINE    Cervical vertebral alignment is significant for grade 1 anterior   listhesis C4 on C5, also slight retrolisthesis C5 on C6.   Facet joints   appear aligned.   Cervical vertebral body heights are maintained.   Cervical vertebral marrow signal intensity mildly heterogeneous from   degenerative endplate changes. These include small marginal osteophytes   most prominently at C5-C6.  No osseous expansion or epidural disease is   found.  No destructive bone lesion is found.   There are osteoarthritic   changes at the articulation of the anterior ring of C1 and the odontoid   process of C2.  This arthropathy includes marginal osteophytes,   subchondral sclerosis and joint space narrowing.    Cervical intervertebral disc spaces demonstrate a generalized pattern of   degeneration characterized by signal intensity loss on the long TR   images. Degenerative disc heightloss appears greatest at C5-C6.   Evaluation of degenerative disc disease is limited in the absence of   axial images.   However, no degenerative high-grade central canal   compromise is recognized.  Neural foramina appear narrowed by disc   materialand uncovertebral joint osteophytes most prominently at the   lower cervical intervertebral disc levels..    Cervical cord morphology Is preserved.  The cervical cord maintains   intact signal intensity   No focal intrinsic cord lesion is identified.   No cord expansion or cord volume loss is recognized.    The visualized adjacent neck structures appear intact.  No neck mass is   recognized.  Paraspinal soft tissues appear intact.  Visualized lymph   nodes appear to be within physiologic size limits.    THORACIC SPINE    Thoracic vertebral alignment demonstrates exaggeration of the mid   thoracic kyphosis. This vertebral malalignment has an apex at the T7   inferior endplate compression fracture. Approximately 70% height loss   results at this level. More limited compression fractures involve the T6   inferior endplate and the T9 superior endplate. Schmorl's nodes result.   More limited superior endplate deformity of T5 and T11. The remaining   thoracic vertebral body heights are maintained. Superior endplate   deformities are also present at L1-L2 and L4. At the L2 vertebral body   burst fracture pattern is present with impression upon the ventral thecal   sac but does not appear to compromise the cauda equina. At the thoracic   vertebral fracture is no high-grade central canal compromise is   recognized. Thoracic vertebral marrow signal intensity relatively   homogeneous without recognition of significant marrow edema.  No   destructive bone lesion, osseous expansion or epidural disease is found.    The paraspinal soft tissues appear intact.    Thoracic intervertebral disc spaces demonstrate a generalized pattern of   at least mild degeneration with signal intensity loss on the long TR   images. Disc heights are largely maintained.   No high-grade degenerative   central canal compromise is recognized. Thoracic neural foramina appear   intact.    The thoracic cord morphology is preserved.  The thoracic cord maintains   intact signal intensity.      IMPRESSION:    1. BRAIN:   Basal ganglia lacunar infarctions. Ischemic white matter   disease and atrophy typical for age    2. CERVICAL SPINE:   Grade 1 anterolisthesis C4 on C5. Low-grade cervical   degenerative disc disease. Cervical cord appears intact.    3. THORACIC SPINE:   Exaggeration in mid thoracic kyphosis with multiple   thoracic endplate deformities that are age indeterminate but felt likely   to be long-standing. Thoracic cord appears intact.    4. Only limited incomplete examinations could be performed on this date.   Recommend completion scans once tolerated by the patient.    --- End of Report ---          < end of copied text >  < from: CT Abdomen and Pelvis No Cont (04.11.24 @ 15:14) >  PROCEDURE DATE:  04/11/2024          INTERPRETATION:  CLINICAL INFORMATION: 73 years  Female with r/o   malignancy.    COMPARISON: None.    CONTRAST/COMPLICATIONS:  IV Contrast: NONE  Oral Contrast: NONE  Complications: None reported at time of study completion    PROCEDURE:  CT of the Abdomen and Pelvis was performed.  Sagittal and coronal reformats were performed.    LIMITATIONS: Evaluation of the solid organs, vascular structures and GI   tract is limited without oral and IV contrast.    FINDINGS:  LOWER CHEST: Trace bilateral pleural effusions.    LIVER: Within normal limits.  BILE DUCTS: Normal caliber.  GALLBLADDER: Within normal limits.  SPLEEN: Within normal limits.  PANCREAS: Within normal limits.  ADRENALS: Within normal limits.  KIDNEYS/URETERS: Trace bilateral hydronephrosis.    BLADDER: Markedly distended measuring 16 cm sagittal.  REPRODUCTIVE ORGANS: Unremarkable uterus.    BOWEL: No bowel obstruction. Appendix is not visualized and cannot be   assessed.. Moderate colonic stool burden.  PERITONEUM: No ascites.  VESSELS: Atherosclerotic changes.  RETROPERITONEUM/LYMPH NODES: No lymphadenopathy.  ABDOMINAL WALL: Anasarca.  BONES: Osteoporosis.Moderate to severe L1, L2, L4 and L5 compression   fractures. No osseous retropulsion.    IMPRESSION:  Evaluation of the solid organs, vascular structures and GI tract is   limited without oral and IV contrast.    Limited evaluation for malignancy.    Distended bladder. Mild hydronephrosis may be related to the bladder   distention.    Moderate constipation.    Other chronic findings as above.    --- End of Report ---    < from: Xray Femur 2 Views, Left (04.09.24 @ 20:35) >  PROCEDURE DATE:  04/09/2024        INTERPRETATION:  Frontal and lateral views of the left femur were   obtained for history of fracture.    Comparison is made to a CT scan of the pelvis on the same day.    The bones are diffusely demineralized. There is a fracture along the   intertrochanteric line of the proximal left femur. The distal segment is   slightly upwardly displaced by less than one half bone width. There is no   dislocation of the left hip which in itself is grossly intact. There is   chronic degenerative narrowing of the joint spaces of the left knee. No   other fractures are seen.    IMPRESSION:  1. Diffuse bony demineralization associated with a fracture along the   intertrochanteric line of the proximal left femur, with only slight   upward migration of the distal segment, but less than one half bone width.  2. Degenerative arthropathy of the left knee.    --- End of Report ---      Imaging Personally Reviewed:  YES    Consultant(s) Notes Reviewed:   YES    Care Discussed with Consultants : YES     Plan of care was discussed with patient and /or primary care giver; all questions and concerns were addressed and care was aligned with patient's wishes.

## 2024-04-17 NOTE — PROGRESS NOTE ADULT - ASSESSMENT
· Assessment	  ROXY FAN is a 73y Female who presents with a chief complaint of hip fracture    Weight Loss  30lb in 2 years  ·	Patient follows with [facility]  ·	CT abdomen/pelvis not contributory with lack of contrast  ·	chest has infiltrates.  she is long term smoker  ·	Likely PET/CT outpatient.  ·	Will need to see if patient had seen gastroenterology recent for endoscopic examination  ·	No other planned oncologic workup inpatient at this time   multiple compression fractures with pain,  ?kyphoplasy later.    Left hip fx  -  Surgical repair 4/16, POD#1.  Tolerated the procedure well  - Cont following surgery recs.      Will continue to follow.

## 2024-04-17 NOTE — PHYSICAL THERAPY INITIAL EVALUATION ADULT - PASSIVE RANGE OF MOTION EXAMINATION, REHAB EVAL
LLE ROM Hip Flexion is limited to 65 ; all other planes of motion are wnl/bilateral upper extremity Passive ROM was WNL (within normal limits)/Right LE Passive ROM was WNL (within normal limits)/deficits as listed below

## 2024-04-17 NOTE — PHYSICAL THERAPY INITIAL EVALUATION ADULT - GENERAL OBSERVATIONS, REHAB EVAL
Pt was found Supine in bed with HOB elevated, vss and NAD with nasal cannula and Eli catheter intact. Pt is A&Ox3 and  was at bedside.

## 2024-04-17 NOTE — PROGRESS NOTE ADULT - SUBJECTIVE AND OBJECTIVE BOX
HPI:  73-year-old female from home ambulates with a walker at baseline with PMH of HTN, COPD ( not on O2 at home), current smoker with 60 ppy hx, hypothyroidism, spinal stenosis urinary incontinence, presenting with left-sided lower back and hip pain after fall 2 weeks ago. Approximately 1-2 weeks ago the patient fell while walking and her leg "gave out" and since then she has had left-sided leg pain.  Patient reports she is able to walk with her cane/walker but feels very weak and unbalanced and has since fallen 2 more times. Pt also complains of SOB. Patient reporting chest pain since the fall today after her cane hit her chest. Patient denies any headache, dizziness, cough, congestion, fever, chills, N,V,D,Abd pain, numbness or tingling. In the ED, pt is comfortable, but complains of pain, NAD, pt is wheezing b/l with R sided crackles, pt was found to be hypoxic on RA to 80s saturating well on 1-2 L NC, hypertensive 175/105, wbc 9, Na 130. CTH and cervical spine shows No gross acute intracranial hemorrhage. No gross acute fracture cervical spine. CTC shows Loss of height of multiple thoracic and lumbar spine vertebral bodies which are of indeterminate age. Dominant cluster of nodular and patchy opacities within the superior segment of the right lower lobe suggestive of infection with endobronchial spread. Emphysema. Intrahepatic and extrahepatic biliary ductal dilatation is of unclear etiology. CT pelvis shows Acute proximal left femoral fracture,  Age-indeterminate severe compression fracture of the L5 vertebral body. Pt is admitted for L hip fracture/ SOB 2/2 PNA vs COPD exacerbation.    Off note: pt states that she lost about 30 lbs in the last 2 months. She is undergoing colonoscopy o/p to evaluate for weight loss. pt denies any blood in the stool. However, she has family history of colon ca in her father.  (09 Apr 2024 19:22)     Subjective:  Pt is seen at bedside.   pt is awake and lying in bed/out of bed to chair  pt seems comfortable and denies any complaints at this time    ROS:  14 point ROS negative except for above.    MEDICATIONS  (STANDING):  albuterol/ipratropium for Nebulization 3 milliLiter(s) Nebulizer every 6 hours  budesonide 160 MICROgram(s)/formoterol 4.5 MICROgram(s) Inhaler 2 Puff(s) Inhalation two times a day  chlorhexidine 2% Cloths 1 Application(s) Topical <User Schedule>  gabapentin 100 milliGRAM(s) Oral every 8 hours  heparin   Injectable 5000 Unit(s) SubCutaneous every 8 hours  levothyroxine 112 MICROGram(s) Oral daily  lidocaine   4% Patch 2 Patch Transdermal daily  losartan 50 milliGRAM(s) Oral daily  morphine ER Tablet 15 milliGRAM(s) Oral every 12 hours  naloxone Injectable 0.4 milliGRAM(s) IV Push once  nicotine -   7 mG/24Hr(s) Patch 1 Patch Transdermal daily  piperacillin/tazobactam IVPB.. 3.375 Gram(s) IV Intermittent every 8 hours  polyethylene glycol 3350 17 Gram(s) Oral daily  senna 2 Tablet(s) Oral at bedtime    MEDICATIONS  (PRN):  acetaminophen     Tablet .. 1000 milliGRAM(s) Oral every 8 hours PRN Mild Pain (1 - 3)  albuterol    90 MICROgram(s) HFA Inhaler 2 Puff(s) Inhalation every 6 hours PRN Bronchospasm  ALPRAZolam 1 milliGRAM(s) Oral every 12 hours PRN Anxiety  benzonatate 100 milliGRAM(s) Oral every 8 hours PRN Cough  bisacodyl 5 milliGRAM(s) Oral daily PRN Constipation  cyclobenzaprine 5 milliGRAM(s) Oral every 8 hours PRN Spasm  oxyCODONE    IR 5 milliGRAM(s) Oral every 4 hours PRN Moderate Pain (4 - 6)  oxyCODONE    IR 10 milliGRAM(s) Oral every 4 hours PRN Severe Pain (7 - 10)      Allergies    contrast media (iodine-based) (Unknown)    Intolerances      Vital Signs Last 24 Hrs  T(C): 36.5 (17 Apr 2024 05:49), Max: 36.7 (16 Apr 2024 14:43)  T(F): 97.7 (17 Apr 2024 05:49), Max: 98.1 (16 Apr 2024 14:43)  HR: 76 (17 Apr 2024 05:49) (69 - 105)  BP: 164/93 (17 Apr 2024 05:49) (101/65 - 164/93)  BP(mean): 92 (17 Apr 2024 01:00) (77 - 94)  RR: 20 (17 Apr 2024 05:49) (17 - 26)  SpO2: 96% (17 Apr 2024 05:49) (90% - 100%)    Parameters below as of 17 Apr 2024 05:49    O2 Flow (L/min): 2    PHYSICAL EXAM  General: adult in NAD  HEENT: clear oropharynx, anicteric sclera, pink conjunctiva  Neck: supple  CV: normal S1/S2 with no murmur rubs or gallops  Lungs: positive air movement b/l ant lungs,clear to auscultation, no wheezes, no rales  Abdomen: soft non-tender non-distended, no hepatosplenomegaly  Ext: no clubbing cyanosis or edema  Skin: no rashes and no petechiae  Neuro: alert and oriented X 4, no focal deficits  LABS:                                  11.3   12.29 )-----------( 370      ( 17 Apr 2024 06:45 )             34.8   04-17    136  |  99  |  17  ----------------------------<  85  3.6   |  30  |  0.55    Ca    8.5      17 Apr 2024 06:45  Phos  3.2     04-16  Mg     2.5     04-16    TPro  6.3  /  Alb  2.8<L>  /  TBili  0.4  /  DBili  x   /  AST  18  /  ALT  19  /  AlkPhos  124<H>  04-17

## 2024-04-17 NOTE — DIETITIAN INITIAL EVALUATION ADULT - OTHER INFO
73F admit w/ hip fx. S/p insertion of locking intramedullary janneth into left hip 4/15. Previous diet (since 4/12) was soft/ bite sized w/ Ensure Plant Based BID. This AM received requests from diet aide (jesus juarezer) and RN that pt/family upset about her diet. Pt visited ( present), this AM (both upset, especially pt who kept wanting to ongoing back over problem). Tried to have pt move forward to solutions. She insists on being on a Regular diet (and menu selection). Pt given business card w/ kitchen phone number. She does not want or like any Ensure (liquid or pudding). Pt accepts fruit yogurt, added to menu (s). Discussed on 6N IDR, this AM, diet order changed. Called kitchen, replaced breakfast (which pt ate/ enjoyed). Per , pt w/ a lot of weight loss from 120# (?time). Pt appears severely malnourished w/ severe loss body fat and muscle mass (BMI 14.2)

## 2024-04-17 NOTE — PROGRESS NOTE ADULT - SUBJECTIVE AND OBJECTIVE BOX
Time of Visit:  Patient seen and examined.     MEDICATIONS  (STANDING):  albuterol/ipratropium for Nebulization 3 milliLiter(s) Nebulizer every 6 hours  enoxaparin Injectable 40 milliGRAM(s) SubCutaneous every 24 hours  levothyroxine 112 MICROGram(s) Oral daily  lidocaine   4% Patch 2 Patch Transdermal daily  losartan 50 milliGRAM(s) Oral daily  morphine ER Tablet 15 milliGRAM(s) Oral every 12 hours  naloxone Injectable 0.4 milliGRAM(s) IV Push once  nicotine -   7 mG/24Hr(s) Patch 1 Patch Transdermal daily  piperacillin/tazobactam IVPB.. 3.375 Gram(s) IV Intermittent every 8 hours  polyethylene glycol 3350 17 Gram(s) Oral daily  predniSONE   Tablet 20 milliGRAM(s) Oral daily  senna 2 Tablet(s) Oral at bedtime      MEDICATIONS  (PRN):  acetaminophen     Tablet .. 1000 milliGRAM(s) Oral every 8 hours PRN Mild Pain (1 - 3)  albuterol    90 MICROgram(s) HFA Inhaler 2 Puff(s) Inhalation every 6 hours PRN Bronchospasm  ALPRAZolam 1 milliGRAM(s) Oral every 12 hours PRN Anxiety  benzonatate 100 milliGRAM(s) Oral every 8 hours PRN Cough  bisacodyl 5 milliGRAM(s) Oral daily PRN Constipation  cyclobenzaprine 5 milliGRAM(s) Oral every 8 hours PRN Spasm  oxyCODONE    IR 10 milliGRAM(s) Oral every 4 hours PRN Severe Pain (7 - 10)  oxyCODONE    IR 5 milliGRAM(s) Oral every 4 hours PRN Moderate Pain (4 - 6)       Medications up to date at time of exam.      PHYSICAL EXAMINATION:  Patient has no new complaints.  GENERAL: The patient  in no apparent distress.     Vital Signs Last 24 Hrs  T(C): 36.5 (17 Apr 2024 13:30), Max: 36.7 (17 Apr 2024 01:00)  T(F): 97.7 (17 Apr 2024 13:30), Max: 98.1 (17 Apr 2024 01:00)  HR: 93 (17 Apr 2024 13:30) (69 - 105)  BP: 128/92 (17 Apr 2024 13:30) (101/65 - 170/107)  BP(mean): 93 (17 Apr 2024 13:30) (77 - 94)  RR: 20 (17 Apr 2024 13:30) (17 - 26)  SpO2: 95% (17 Apr 2024 13:30) (90% - 100%)    Parameters below as of 17 Apr 2024 13:30  Patient On (Oxygen Delivery Method): nasal cannula  O2 Flow (L/min): 2     (if applicable)    Chest Tube (if applicable)    HEENT: Head is normocephalic and atraumatic. Extraocular muscles are intact. Mucous membranes are moist.     NECK: Supple, no palpable adenopathy.    LUNGS: Fair bilateral berath sounds  no wheezing, rales, or rhonchi.    HEART: Regular rate and rhythm without murmur.    ABDOMEN: Soft, nontender, and nondistended.  No hepatosplenomegaly is noted.    : No painful voiding, no pelvic pain    EXTREMITIES: Without any cyanosis, clubbing, rash, lesions or edema.    NEUROLOGIC: Awake, alert, oriented, grossly intact    SKIN: Warm, dry, good turgor.      LABS:                        11.3   12.29 )-----------( 370      ( 17 Apr 2024 06:45 )             34.8     04-17    136  |  99  |  17  ----------------------------<  85  3.6   |  30  |  0.55    Ca    8.5      17 Apr 2024 06:45  Phos  3.2     04-16  Mg     2.5     04-16    TPro  6.3  /  Alb  2.8<L>  /  TBili  0.4  /  DBili  x   /  AST  18  /  ALT  19  /  AlkPhos  124<H>  04-17      Urinalysis Basic - ( 17 Apr 2024 06:45 )    Color: x / Appearance: x / SG: x / pH: x  Gluc: 85 mg/dL / Ketone: x  / Bili: x / Urobili: x   Blood: x / Protein: x / Nitrite: x   Leuk Esterase: x / RBC: x / WBC x   Sq Epi: x / Non Sq Epi: x / Bacteria: x                  Procalcitonin, Serum: 0.08 ng/mL (04-16-24 @ 07:06)      MICROBIOLOGY: (if applicable)    RADIOLOGY & ADDITIONAL STUDIES:  EKG:   CXR:  ECHO:    IMPRESSION: 73y Female PAST MEDICAL & SURGICAL HISTORY:  HTN (hypertension)      Hypothyroidism      COPD (chronic obstructive pulmonary disease)      Spinal stenosis      Current smoker      COPD with pneumonia      H/O foot surgery       p/w            Time of Visit:  Patient seen and examined.     MEDICATIONS  (STANDING):  albuterol/ipratropium for Nebulization 3 milliLiter(s) Nebulizer every 6 hours  enoxaparin Injectable 40 milliGRAM(s) SubCutaneous every 24 hours  levothyroxine 112 MICROGram(s) Oral daily  lidocaine   4% Patch 2 Patch Transdermal daily  losartan 50 milliGRAM(s) Oral daily  morphine ER Tablet 15 milliGRAM(s) Oral every 12 hours  naloxone Injectable 0.4 milliGRAM(s) IV Push once  nicotine -   7 mG/24Hr(s) Patch 1 Patch Transdermal daily  piperacillin/tazobactam IVPB.. 3.375 Gram(s) IV Intermittent every 8 hours  polyethylene glycol 3350 17 Gram(s) Oral daily  predniSONE   Tablet 20 milliGRAM(s) Oral daily  senna 2 Tablet(s) Oral at bedtime      MEDICATIONS  (PRN):  acetaminophen     Tablet .. 1000 milliGRAM(s) Oral every 8 hours PRN Mild Pain (1 - 3)  albuterol    90 MICROgram(s) HFA Inhaler 2 Puff(s) Inhalation every 6 hours PRN Bronchospasm  ALPRAZolam 1 milliGRAM(s) Oral every 12 hours PRN Anxiety  benzonatate 100 milliGRAM(s) Oral every 8 hours PRN Cough  bisacodyl 5 milliGRAM(s) Oral daily PRN Constipation  cyclobenzaprine 5 milliGRAM(s) Oral every 8 hours PRN Spasm  oxyCODONE    IR 10 milliGRAM(s) Oral every 4 hours PRN Severe Pain (7 - 10)  oxyCODONE    IR 5 milliGRAM(s) Oral every 4 hours PRN Moderate Pain (4 - 6)       Medications up to date at time of exam.      PHYSICAL EXAMINATION:  Patient has no new complaints.  GENERAL: The patient  in no apparent distress.     Vital Signs Last 24 Hrs  T(C): 36.5 (17 Apr 2024 13:30), Max: 36.7 (17 Apr 2024 01:00)  T(F): 97.7 (17 Apr 2024 13:30), Max: 98.1 (17 Apr 2024 01:00)  HR: 93 (17 Apr 2024 13:30) (69 - 105)  BP: 128/92 (17 Apr 2024 13:30) (101/65 - 170/107)  BP(mean): 93 (17 Apr 2024 13:30) (77 - 94)  RR: 20 (17 Apr 2024 13:30) (17 - 26)  SpO2: 95% (17 Apr 2024 13:30) (90% - 100%)    Parameters below as of 17 Apr 2024 13:30  Patient On (Oxygen Delivery Method): nasal cannula  O2 Flow (L/min): 2     (if applicable)    Chest Tube (if applicable)    HEENT: Head is normocephalic and atraumatic. Extraocular muscles are intact. Mucous membranes are moist.     NECK: Supple, no palpable adenopathy.    LUNGS: Fair bilateral berath sounds  no wheezing, rales, or rhonchi.    HEART: Regular rate and rhythm without murmur.    ABDOMEN: Soft, nontender, and nondistended.  No hepatosplenomegaly is noted.    : No painful voiding, no pelvic pain    EXTREMITIES: Without any cyanosis, clubbing, rash, lesions or edema.    NEUROLOGIC: Awake, alert, oriented, grossly intact    SKIN: Warm, dry, good turgor.      LABS:                        11.3   12.29 )-----------( 370      ( 17 Apr 2024 06:45 )             34.8     04-17    136  |  99  |  17  ----------------------------<  85  3.6   |  30  |  0.55    Ca    8.5      17 Apr 2024 06:45  Phos  3.2     04-16  Mg     2.5     04-16    TPro  6.3  /  Alb  2.8<L>  /  TBili  0.4  /  DBili  x   /  AST  18  /  ALT  19  /  AlkPhos  124<H>  04-17      Urinalysis Basic - ( 17 Apr 2024 06:45 )    Color: x / Appearance: x / SG: x / pH: x  Gluc: 85 mg/dL / Ketone: x  / Bili: x / Urobili: x   Blood: x / Protein: x / Nitrite: x   Leuk Esterase: x / RBC: x / WBC x   Sq Epi: x / Non Sq Epi: x / Bacteria: x                  Procalcitonin, Serum: 0.08 ng/mL (04-16-24 @ 07:06)      MICROBIOLOGY: (if applicable)    RADIOLOGY & ADDITIONAL STUDIES:  EKG:   CXR:  ECHO:    IMPRESSION: 73y Female PAST MEDICAL & SURGICAL HISTORY:  HTN (hypertension)      Hypothyroidism      COPD (chronic obstructive pulmonary disease)      Spinal stenosis      Current smoker      COPD with pneumonia      H/O foot surgery       p/w       Impression: This is a 73 yr old woman  with s/p multiple Falls at Home and sustained Left Hip fracture . Presented with complains of episode of SOB , hypoxia in ED with o2 saturation 80s room air due to Acute hypoxic respiratory failure secondary to combination of Right lung Pneumonia showing on CT chest   and COPD / emphysema exacerbation , Active smoker . Negative swab for RVP, Covid. Negative UA for legionella .     Suggestion:  - S/P L hip FX repair   - Pain management   - Continue O2 supp as needed   - Duoneb via nebulization Q 6 hours . Do not give at the same time with PRN Albuterol 90 mcg 2 puffs Q 6 Hours.   - Symbicort 160- 4.5 mcg 2 puffs Twice daily. using Wixela Twice daily outpatient .   - On Zosyn 3.375 Gm IVPB Q 8 Hours .  - Reinforced the importance of smoking cessation  ( is a smoker too ) , may not quit but willing to continue  the Nicotine patch   - On nicotine patch on x 12 hours.

## 2024-04-17 NOTE — PROGRESS NOTE ADULT - SUBJECTIVE AND OBJECTIVE BOX
Patient is a 73y old  Female who presents with a chief complaint of Fracture of unspecified part of neck of unspecified femur, initial encounter for closed fracture     (17 Apr 2024 15:14)      INTERVAL HPI/OVERNIGHT EVENTS: pt seen at bedside with no new complaints    MEDICATIONS  (STANDING):  albuterol/ipratropium for Nebulization 3 milliLiter(s) Nebulizer every 6 hours  enoxaparin Injectable 40 milliGRAM(s) SubCutaneous every 24 hours  levothyroxine 112 MICROGram(s) Oral daily  lidocaine   4% Patch 2 Patch Transdermal daily  losartan 50 milliGRAM(s) Oral daily  morphine ER Tablet 15 milliGRAM(s) Oral every 12 hours  naloxone Injectable 0.4 milliGRAM(s) IV Push once  nicotine -   7 mG/24Hr(s) Patch 1 Patch Transdermal daily  piperacillin/tazobactam IVPB.. 3.375 Gram(s) IV Intermittent every 8 hours  polyethylene glycol 3350 17 Gram(s) Oral daily  predniSONE   Tablet 20 milliGRAM(s) Oral daily  senna 2 Tablet(s) Oral at bedtime    MEDICATIONS  (PRN):  acetaminophen     Tablet .. 1000 milliGRAM(s) Oral every 8 hours PRN Mild Pain (1 - 3)  albuterol    90 MICROgram(s) HFA Inhaler 2 Puff(s) Inhalation every 6 hours PRN Bronchospasm  ALPRAZolam 1 milliGRAM(s) Oral every 12 hours PRN Anxiety  benzonatate 100 milliGRAM(s) Oral every 8 hours PRN Cough  bisacodyl 5 milliGRAM(s) Oral daily PRN Constipation  cyclobenzaprine 5 milliGRAM(s) Oral every 8 hours PRN Spasm  oxyCODONE    IR 5 milliGRAM(s) Oral every 4 hours PRN Moderate Pain (4 - 6)  oxyCODONE    IR 10 milliGRAM(s) Oral every 4 hours PRN Severe Pain (7 - 10)  __________________________________________________  REVIEW OF SYSTEMS:    CONSTITUTIONAL: No fever,   EYES: no acute visual disturbances  NECK: No pain or stiffness  RESPIRATORY: No cough; No shortness of breath  CARDIOVASCULAR: No chest pain, no palpitations  GASTROINTESTINAL: No pain. No nausea or vomiting; No diarrhea   NEUROLOGICAL: No headache or numbness, no tremors  MUSCULOSKELETAL: No joint pain, no muscle pain  GENITOURINARY: no dysuria, no frequency, no hesitancy  PSYCHIATRY: no depression , no anxiety  ALL OTHER  ROS negative      Vital Signs Last 24 Hrs  T(C): 36.5 (17 Apr 2024 13:30), Max: 36.7 (17 Apr 2024 01:00)  T(F): 97.7 (17 Apr 2024 13:30), Max: 98.1 (17 Apr 2024 01:00)  HR: 93 (17 Apr 2024 13:30) (69 - 105)  BP: 128/92 (17 Apr 2024 13:30) (101/65 - 170/107)  BP(mean): 93 (17 Apr 2024 13:30) (77 - 94)  RR: 20 (17 Apr 2024 13:30) (17 - 26)  SpO2: 95% (17 Apr 2024 13:30) (90% - 100%)    Parameters below as of 17 Apr 2024 13:30  Patient On (Oxygen Delivery Method): nasal cannula  O2 Flow (L/min): 2  ________________________________________________  PHYSICAL EXAM:  GENERAL: NAD  HEENT: Normocephalic;  conjunctivae and sclerae clear; moist mucous membranes;   NECK : supple  CHEST/LUNG: Clear to auscultation bilaterally with good air entry   HEART: S1 S2  regular; no murmurs, gallops or rubs  ABDOMEN: Soft, Nontender, Nondistended; Bowel sounds present  EXTREMITIES: no cyanosis; no edema; no calf tenderness, +pedal pulses  SKIN: warm and dry; surgical incision to left hip-- dressing dry and intact  NERVOUS SYSTEM:  Awake and alert; Oriented  to place, person and time ; no new deficits    _________________________________________________  LABS:                        11.3   12.29 )-----------( 370      ( 17 Apr 2024 06:45 )             34.8     04-17    136  |  99  |  17  ----------------------------<  85  3.6   |  30  |  0.55    Ca    8.5      17 Apr 2024 06:45  Phos  3.2     04-16  Mg     2.5     04-16    TPro  6.3  /  Alb  2.8<L>  /  TBili  0.4  /  DBili  x   /  AST  18  /  ALT  19  /  AlkPhos  124<H>  04-17    Urinalysis Basic - ( 17 Apr 2024 06:45 )    Color: x / Appearance: x / SG: x / pH: x  Gluc: 85 mg/dL / Ketone: x  / Bili: x / Urobili: x   Blood: x / Protein: x / Nitrite: x   Leuk Esterase: x / RBC: x / WBC x   Sq Epi: x / Non Sq Epi: x / Bacteria: x    RADIOLOGY & ADDITIONAL TESTS:   < from: MR Thoracic Spine No Cont (04.11.24 @ 19:15) >  PROCEDURE DATE:  04/11/2024          INTERPRETATION:  Three examinations were performed in this patient:  1.  MR of the brain without gadolinium contrast  2.  MR cervical spine without gadolinium contrast  3.  MR thoracic spine without gadolinium contrast    CLINICAL INFORMATION: Post fall trauma cord compression  HMR  Admitting   Dxs: S72.009A FRACTURE OF UNSP PART OF NECK OF UNSP FEMUR, INIT    TECHNIQUE:  1. MR brain:  Sagittal and axial T1-weighted images, axial FLAIR images,   axial susceptibility weighted images, axial T2-weighted images and axial   diffusion weighted images of the brain were obtained.  2.  MR cervical spine:   Sagittal T2-weighted images were obtained.  3.  MR thoracic spine:   Sagittal T2-weighted images were obtained.  The remainder these examinations could not be completed, not tolerated by   the patient. The images that could be obtained are degraded by patient   motion.  CONTRAST:    None    COMPARISON:   CT head 4/10/2023 and CT cervical and CT chest 4/9/2023    FINDINGS:    BRAIN    BRAIN:   The brain demonstrates small focal lesions of remote deep   infarction. The most conspicuous are noted in the external capsules. No   cerebral cortical lesion is recognized.   No diffusion restriction is   found in the brain.  No acute cerebral cortical infarct is found.   No   intracranial hemorrhage is recognized.  No mass effect is found in the   brain.   The brain also demonstrates patchy indistinct lesions within the   deep cerebral hemispheric white matter typical for ischemic white matter   disease.    CSF SPACES:   The ventricles, sulci and basal cisterns appear moderately   dilated reflecting diffuse brain volume loss.    VESSELS:   The vertebral and internal carotid arteries demonstrate   expected flow voids indicating their patency.    HEAD AND NECK STRUCTURES:   The orbits are unremarkable.  Paranasal   sinuses are clear.  The nasal cavity demonstrates left-to-right septal   deviation. Left middle turbinate tristen bullosa is present..  The central   skull base appears intact.  The nasopharynx is symmetric.  The temporal   bones appear clear of disease.  The calvarium appears unremarkable.    CERVICAL SPINE    Cervical vertebral alignment is significant for grade 1 anterior   listhesis C4 on C5, also slight retrolisthesis C5 on C6.   Facet joints   appear aligned.   Cervical vertebral body heights are maintained.   Cervical vertebral marrow signal intensity mildly heterogeneous from   degenerative endplate changes. These include small marginal osteophytes   most prominently at C5-C6.  No osseous expansion or epidural disease is   found.  No destructive bone lesion is found.   There are osteoarthritic   changes at the articulation of the anterior ring of C1 and the odontoid   process of C2.  This arthropathy includes marginal osteophytes,   subchondral sclerosis and joint space narrowing.    Cervical intervertebral disc spaces demonstrate a generalized pattern of   degeneration characterized by signal intensity loss on the long TR   images. Degenerative disc heightloss appears greatest at C5-C6.   Evaluation of degenerative disc disease is limited in the absence of   axial images.   However, no degenerative high-grade central canal   compromise is recognized.  Neural foramina appear narrowed by disc   materialand uncovertebral joint osteophytes most prominently at the   lower cervical intervertebral disc levels..    Cervical cord morphology Is preserved.  The cervical cord maintains   intact signal intensity   No focal intrinsic cord lesion is identified.   No cord expansion or cord volume loss is recognized.    The visualized adjacent neck structures appear intact.  No neck mass is   recognized.  Paraspinal soft tissues appear intact.  Visualized lymph   nodes appear to be within physiologic size limits.    THORACIC SPINE    Thoracic vertebral alignment demonstrates exaggeration of the mid   thoracic kyphosis. This vertebral malalignment has an apex at the T7   inferior endplate compression fracture. Approximately 70% height loss   results at this level. More limited compression fractures involve the T6   inferior endplate and the T9 superior endplate. Schmorl's nodes result.   More limited superior endplate deformity of T5 and T11. The remaining   thoracic vertebral body heights are maintained. Superior endplate   deformities are also present at L1-L2 and L4. At the L2 vertebral body   burst fracture pattern is present with impression upon the ventral thecal   sac but does not appear to compromise the cauda equina. At the thoracic   vertebral fracture is no high-grade central canal compromise is   recognized. Thoracic vertebral marrow signal intensity relatively   homogeneous without recognition of significant marrow edema.  No   destructive bone lesion, osseous expansion or epidural disease is found.    The paraspinal soft tissues appear intact.    Thoracic intervertebral disc spaces demonstrate a generalized pattern of   at least mild degeneration with signal intensity loss on the long TR   images. Disc heights are largely maintained.   No high-grade degenerative   central canal compromise is recognized. Thoracic neural foramina appear   intact.    The thoracic cord morphology is preserved.  The thoracic cord maintains   intact signal intensity.      IMPRESSION:    1. BRAIN:   Basal ganglia lacunar infarctions. Ischemic white matter   disease and atrophy typical for age    2. CERVICAL SPINE:   Grade 1 anterolisthesis C4 on C5. Low-grade cervical   degenerative disc disease. Cervical cord appears intact.    3. THORACIC SPINE:   Exaggeration in mid thoracic kyphosis with multiple   thoracic endplate deformities that are age indeterminate but felt likely   to be long-standing. Thoracic cord appears intact.    4. Only limited incomplete examinations could be performed on this date.   Recommend completion scans once tolerated by the patient.    --- End of Report ---          < end of copied text >  < from: CT Abdomen and Pelvis No Cont (04.11.24 @ 15:14) >  PROCEDURE DATE:  04/11/2024          INTERPRETATION:  CLINICAL INFORMATION: 73 years  Female with r/o   malignancy.    COMPARISON: None.    CONTRAST/COMPLICATIONS:  IV Contrast: NONE  Oral Contrast: NONE  Complications: None reported at time of study completion    PROCEDURE:  CT of the Abdomen and Pelvis was performed.  Sagittal and coronal reformats were performed.    LIMITATIONS: Evaluation of the solid organs, vascular structures and GI   tract is limited without oral and IV contrast.    FINDINGS:  LOWER CHEST: Trace bilateral pleural effusions.    LIVER: Within normal limits.  BILE DUCTS: Normal caliber.  GALLBLADDER: Within normal limits.  SPLEEN: Within normal limits.  PANCREAS: Within normal limits.  ADRENALS: Within normal limits.  KIDNEYS/URETERS: Trace bilateral hydronephrosis.    BLADDER: Markedly distended measuring 16 cm sagittal.  REPRODUCTIVE ORGANS: Unremarkable uterus.    BOWEL: No bowel obstruction. Appendix is not visualized and cannot be   assessed.. Moderate colonic stool burden.  PERITONEUM: No ascites.  VESSELS: Atherosclerotic changes.  RETROPERITONEUM/LYMPH NODES: No lymphadenopathy.  ABDOMINAL WALL: Anasarca.  BONES: Osteoporosis.Moderate to severe L1, L2, L4 and L5 compression   fractures. No osseous retropulsion.    IMPRESSION:  Evaluation of the solid organs, vascular structures and GI tract is   limited without oral and IV contrast.    Limited evaluation for malignancy.    Distended bladder. Mild hydronephrosis may be related to the bladder   distention.    Moderate constipation.    Other chronic findings as above.    --- End of Report ---    < from: Xray Femur 2 Views, Left (04.09.24 @ 20:35) >  PROCEDURE DATE:  04/09/2024        INTERPRETATION:  Frontal and lateral views of the left femur were   obtained for history of fracture.    Comparison is made to a CT scan of the pelvis on the same day.    The bones are diffusely demineralized. There is a fracture along the   intertrochanteric line of the proximal left femur. The distal segment is   slightly upwardly displaced by less than one half bone width. There is no   dislocation of the left hip which in itself is grossly intact. There is   chronic degenerative narrowing of the joint spaces of the left knee. No   other fractures are seen.    IMPRESSION:  1. Diffuse bony demineralization associated with a fracture along the   intertrochanteric line of the proximal left femur, with only slight   upward migration of the distal segment, but less than one half bone width.  2. Degenerative arthropathy of the left knee.    --- End of Report ---      Imaging Personally Reviewed:  YES    Consultant(s) Notes Reviewed:   YES    Care Discussed with Consultants : YES     Plan of care was discussed with patient and /or primary care giver; all questions and concerns were addressed and care was aligned with patient's wishes.

## 2024-04-17 NOTE — DIETITIAN INITIAL EVALUATION ADULT - MALNUTRITION
PCM (severe) related to chronic undernutrition (caloric deficit) as evidenced by severe loss body fat and severe loss muscle mass (BMI 14.2) see above

## 2024-04-17 NOTE — PROGRESS NOTE ADULT - SUBJECTIVE AND OBJECTIVE BOX
73yFemale    Diagnosis:  S/p IM nailing Left hip fx POD# 1    24hr interval/acute events:  Pt complaint of cough. Patient tested positive for adenovirus yesterday.    Pain is moderate; localized to the LLE and well controlled with medication.  Denies cp, sob, palpitations, paresthesias, nvd.    Vital Signs Last 24 Hrs  T(C): 36.5 (17 Apr 2024 05:49), Max: 36.7 (16 Apr 2024 14:43)  T(F): 97.7 (17 Apr 2024 05:49), Max: 98.1 (16 Apr 2024 14:43)  HR: 76 (17 Apr 2024 05:49) (69 - 105)  BP: 164/93 (17 Apr 2024 05:49) (101/65 - 164/93)  BP(mean): 92 (17 Apr 2024 01:00) (77 - 94)  RR: 20 (17 Apr 2024 05:49) (17 - 26)  SpO2: 96% (17 Apr 2024 05:49) (90% - 100%)    Parameters below as of 17 Apr 2024 05:49    O2 Flow (L/min): 2    I&O's Detail    16 Apr 2024 07:01  -  17 Apr 2024 07:00  --------------------------------------------------------  IN:  Total IN: 0 mL    OUT:    Indwelling Catheter - Urethral (mL): 1075 mL  Total OUT: 1075 mL    Total NET: -1075 mL          Physical Exam:    General: AAOx3, in NAD, resting comfortably in bed.  Left hip:    Skin pink, warm LLE.  In nl. alignment.   Dressing is C/D/I.    No ct, calves soft  2+pulses  NVI silt  5/5 strength ehl/ta/gs b/l.                          11.3   12.29 )-----------( 370      ( 17 Apr 2024 06:45 )             34.8     04-17    136  |  99  |  17  ----------------------------<  85  3.6   |  30  |  0.55    Ca    8.5      17 Apr 2024 06:45  Phos  3.2     04-16  Mg     2.5     04-16    TPro  6.3  /  Alb  2.8<L>  /  TBili  0.4  /  DBili  x   /  AST  18  /  ALT  19  /  AlkPhos  124<H>  04-17      Impression:  73yFemale S/p IM nailing Left hip fx POD# 1  Plan:  -  Continue pain management and home meds  -  DVT prophylaxis with Lovenox  -  Daily Physical Therapy:  WBAT on LLE with appropriate assistive device  -  Discharge planning: Home Vs. Rehab pending   -  Encouraged use of Incentive Spirometry   -  Albumin low, will supplement diet  -  Continue Antibiotics x 24hrs post-op  -  Case d/w Dr. Lang

## 2024-04-17 NOTE — PROGRESS NOTE ADULT - SUBJECTIVE AND OBJECTIVE BOX
Patient is seen and examined at the bed side, is afebrile. She is s/p Left Hip IM nailing on 4/16/24 and tolerated the procedure well.       REVIEW OF SYSTEMS: All other review systems are negative      ALLERGIES: No Known Allergies      Vital Signs Last 24 Hrs  T(C): 36.5 (17 Apr 2024 13:30), Max: 36.7 (17 Apr 2024 01:00)  T(F): 97.7 (17 Apr 2024 13:30), Max: 98.1 (17 Apr 2024 01:00)  HR: 93 (17 Apr 2024 13:30) (69 - 105)  BP: 128/92 (17 Apr 2024 13:30) (101/65 - 170/107)  BP(mean): 93 (17 Apr 2024 13:30) (77 - 94)  RR: 20 (17 Apr 2024 13:30) (17 - 26)  SpO2: 95% (17 Apr 2024 13:30) (90% - 100%)    Parameters below as of 17 Apr 2024 13:30  Patient On (Oxygen Delivery Method): nasal cannula  O2 Flow (L/min): 2        PHYSICAL EXAM:  GENERAL: Not in distress, back on oxygen via NC   CHEST/LUNG: Not using accessory muscles   HEART: s1 and s2 present  ABDOMEN:  Nontender and  Nondistended  EXTREMITIES: Left hip bandage in placed   CNS: Awake and Alert      LABS:                        11.3   12.29 )-----------( 370      ( 17 Apr 2024 06:45 )             34.8                           12.4   9.29  )-----------( 346      ( 16 Apr 2024 07:06 )             37.8          04-17    136  |  99  |  17  ----------------------------<  85  3.6   |  30  |  0.55    Ca    8.5      17 Apr 2024 06:45  Phos  3.2     04-16  Mg     2.5     04-16    TPro  6.3  /  Alb  2.8<L>  /  TBili  0.4  /  DBili  x   /  AST  18  /  ALT  19  /  AlkPhos  124<H>  04-17    04-16    137  |  98  |  12  ----------------------------<  109<H>  3.3<L>   |  32<H>  |  0.44<L>    Ca    8.8      16 Apr 2024 07:06  Phos  3.2     04-16  Mg     2.5     04-16    TPro  6.9  /  Alb  2.9<L>  /  TBili  0.4  /  DBili  x   /  AST  13  /  ALT  18  /  AlkPhos  121<H>  04-16            PT/INR - ( 10 Apr 2024 05:10 )   PT: 10.9 sec;   INR: 0.95 ratio       PTT - ( 10 Apr 2024 05:10 )  PTT:29.3 sec      MEDICATIONS  (STANDING):    albuterol/ipratropium for Nebulization 3 milliLiter(s) Nebulizer every 6 hours  enoxaparin Injectable 40 milliGRAM(s) SubCutaneous every 24 hours  levothyroxine 112 MICROGram(s) Oral daily  lidocaine   4% Patch 2 Patch Transdermal daily  losartan 50 milliGRAM(s) Oral daily  morphine ER Tablet 15 milliGRAM(s) Oral every 12 hours  naloxone Injectable 0.4 milliGRAM(s) IV Push once  nicotine -   7 mG/24Hr(s) Patch 1 Patch Transdermal daily  piperacillin/tazobactam IVPB.. 3.375 Gram(s) IV Intermittent every 8 hours  polyethylene glycol 3350 17 Gram(s) Oral daily  predniSONE   Tablet 20 milliGRAM(s) Oral daily  senna 2 Tablet(s) Oral at bedtime      RADIOLOGY & ADDITIONAL TESTS:    4/9/24 : CT Chest No Cont (04.09.24 @ 17:06) Axial CT images of the chest are obtained without intravenous administration of contrast.      IMPRESSION: Loss of height of multiple thoracic and lumbar spine vertebral bodies which are of indeterminate age. Clinical correlation for   back pain is recommended. Dedicated spinal MRI can be performed for complete evaluation.    Dominant cluster of nodular and patchy opacities within the superior segment of the right lower lobe suggestive of infection with   endobronchial spread. A 1-3 month follow-up noncontrast chest CT is recommended to ensure improvement/resolution.    Emphysema.    Intrahepatic and extrahepatic biliary ductal dilatation is of unclear etiology. Upper abdominal ultrasound can be performedfor further   evaluation as clinically warranted.      4/10/24: CT Head No Cont (04.10.24 @ 15:55) IMPRESSION: Age-appropriate involutional and ischemic gliotic changes. No   hemorrhage. No change since 4/9/2024.      4/9/24 : Xray Femur 2 Views, Left (04.09.24 @ 20:35) 1. Diffuse bony demineralization associated with a fracture along the   intertrochanteric line of the proximal left femur, with only slight upward migration of the distal segment, but less than one half bone width.  2. Degenerative arthropathy of the left knee.    4/9/24: Xray Pelvis AP only (04.09.24 @ 20:35) 1. Diffuse bony demineralization with a fracture along the   intertrochanteric line of the proximal left femur. No dislocation of the hips.        MICROBIOLOGY DATA:  Respiratory Viral Panel with COVID-19 by SHAHANA (04.16.24 @ 15:15)   Rapid RVP Result: Detected  SARS-CoV-2: NotDetec:    MRSA/MSSA PCR (04.11.24 @ 07:02)   MRSA PCR Result.: NotDetec:    Legionella pneumophila Antigen, Urine (04.10.24 @ 05:40)   Legionella Antigen, Urine: Negative:      Respiratory Viral Panel with COVID-19 by SHAHANA (04.09.24 @ 21:24)   Rapid RVP Result: NotDetec  SARS-CoV-2: NotDetec:

## 2024-04-17 NOTE — PROGRESS NOTE ADULT - PROBLEM SELECTOR PLAN 1
Pt presented s/p fall  -CT pelvis shows acute proximal left femoral fracture  - POD #1 left hip IM nailing 4/16   - Ortho following  - PT Recs [email or web address]

## 2024-04-17 NOTE — DIETITIAN INITIAL EVALUATION ADULT - NSICDXPASTMEDICALHX_GEN_ALL_CORE_FT
PAST MEDICAL HISTORY:  COPD (chronic obstructive pulmonary disease)     COPD with pneumonia     Current smoker     HTN (hypertension)     Hypothyroidism     Spinal stenosis

## 2024-04-17 NOTE — DIETITIAN INITIAL EVALUATION ADULT - PERTINENT LABORATORY DATA
04-17    136  |  99  |  17  ----------------------------<  85  3.6   |  30  |  0.55    Ca    8.5      17 Apr 2024 06:45  Phos  3.2     04-16  Mg     2.5     04-16    TPro  6.3  /  Alb  2.8<L>  /  TBili  0.4  /  DBili  x   /  AST  18  /  ALT  19  /  AlkPhos  124<H>  04-17

## 2024-04-17 NOTE — PHYSICAL THERAPY INITIAL EVALUATION ADULT - MANUAL MUSCLE TESTING RESULTS, REHAB EVAL
Pt BL UE is 3+/5 grossly assessed. RLE is 3+/5 grossly assessed ; LLE Hip Flexion/Extension is 3+/5 ; all other planes of motion are 3-/5 grossly assessed/grossly assessed due to

## 2024-04-17 NOTE — DIETITIAN INITIAL EVALUATION ADULT - PROBLEM SELECTOR PLAN 2
p/w SOB  pt with hx of COPD not on home O2, and current smoker  pt is hypoxic on RA to 80s  requiring 1-2 L NC  pt is wheezing on examination with R sided crackles  CTC shows Loss of height of multiple thoracic and lumbar spine vertebral bodies which are of indeterminate age. Dominant cluster of nodular and patchy opacities within the superior segment of the right lower lobe suggestive of infection with endobronchial spread. Emphysema.  SOB likely PNA vs COPD exacerbation  Start zosyn for empiric coverage for possible asp pna  Send strep ag, legionella, RVP, procalcitonin, mycoplasma igm  tylenol prn  start albuterol, duonebs, prednisone 40 mg qd, and symbicort

## 2024-04-17 NOTE — DIETITIAN INITIAL EVALUATION ADULT - NS FNS DIET ORDER
Diet, Regular (04-17-24 @ 10:01)  
no change in bowel habits/no flatulence/no diarrhea/no abdominal pain/no nausea/no vomiting/no constipation

## 2024-04-17 NOTE — DIETITIAN INITIAL EVALUATION ADULT - PERTINENT MEDS FT
MEDICATIONS  (STANDING):  albuterol/ipratropium for Nebulization 3 milliLiter(s) Nebulizer every 6 hours  enoxaparin Injectable 40 milliGRAM(s) SubCutaneous every 24 hours  levothyroxine 112 MICROGram(s) Oral daily  lidocaine   4% Patch 2 Patch Transdermal daily  losartan 50 milliGRAM(s) Oral daily  morphine ER Tablet 15 milliGRAM(s) Oral every 12 hours  naloxone Injectable 0.4 milliGRAM(s) IV Push once  nicotine -   7 mG/24Hr(s) Patch 1 Patch Transdermal daily  piperacillin/tazobactam IVPB.. 3.375 Gram(s) IV Intermittent every 8 hours  polyethylene glycol 3350 17 Gram(s) Oral daily  predniSONE   Tablet 20 milliGRAM(s) Oral daily  senna 2 Tablet(s) Oral at bedtime    MEDICATIONS  (PRN):  acetaminophen     Tablet .. 1000 milliGRAM(s) Oral every 8 hours PRN Mild Pain (1 - 3)  albuterol    90 MICROgram(s) HFA Inhaler 2 Puff(s) Inhalation every 6 hours PRN Bronchospasm  ALPRAZolam 1 milliGRAM(s) Oral every 12 hours PRN Anxiety  benzonatate 100 milliGRAM(s) Oral every 8 hours PRN Cough  bisacodyl 5 milliGRAM(s) Oral daily PRN Constipation  cyclobenzaprine 5 milliGRAM(s) Oral every 8 hours PRN Spasm  oxyCODONE    IR 10 milliGRAM(s) Oral every 4 hours PRN Severe Pain (7 - 10)  oxyCODONE    IR 5 milliGRAM(s) Oral every 4 hours PRN Moderate Pain (4 - 6)

## 2024-04-17 NOTE — PHYSICAL THERAPY INITIAL EVALUATION ADULT - ACTIVE RANGE OF MOTION EXAMINATION, REHAB EVAL
LLE ROM Hip Flexion is limited to 20 ; all other planes of motion are wfl/amber. upper extremity Active ROM was WNL (within normal limits)/RLE Active ROM was WNL (within normal limits)/deficits as listed below

## 2024-04-17 NOTE — PROGRESS NOTE ADULT - ASSESSMENT
Patient is a 73y old  Female who is from home ambulates with a walker at baseline with PMH of HTN, COPD ( not on O2 at home), current smoker with 60 ppy hx, hypothyroidism, spinal stenosis urinary incontinence, now presents to the ER for evaluation of left-sided lower back and hip pain after fall 2 weeks ago. Approximately 1-2 weeks ago the patient fell while walking and her leg "gave out" and since then she has had left-sided leg pain.  Patient reports she is able to walk with her cane/walker but feels very weak and unbalanced and has since fallen 2 more times. Pt also complains of SOB and chest pain since the fall today after her cane hit her chest.  On admission, she has no fever but found to be hypoxic on RA to 80s saturating well on 1-2 L NC. The CT chest shows Dominant cluster of nodular and patchy opacities within the superior segment of the right lower lobe suggestive of infection with endobronchial spread. Emphysema. Intrahepatic and extrahepatic biliary ductal dilatation is of unclear etiology. CT pelvis shows Acute proximal left femoral fracture,  Age-indeterminate severe compression fracture of the L5 vertebral body. Pt is admitted for L hip fracture/ SOB 2/2 PNA vs COPD exacerbation. She has started on Zosyn and the ID consult requested to assist with further evaluation and antibiotic management.    # Pneumonia - Legionella urine Ag and MRSA PCR is negative   # Left femoral fracture, s/p Fall- s/p Left Hip IM nailing on 4/16/24 a  # positive Adenovirus   # Leukocytosis - 4/16    would recommend :    1. Post-op Labs   2, Supportive care for Adenovirus  3. Aspiration precaution  4. Discontinue Zosyn after today's doses  5. Pain management as needed    d/w Covering NP, Carrie, and patient     Attending Attestation:    Spent more than 35 minutes on total encounter, more than 50 % of the visit was spent counseling and/or coordinating care by the Attending physician.

## 2024-04-17 NOTE — PHYSICAL THERAPY INITIAL EVALUATION ADULT - DID THE PATIENT HAVE SURGERY?
L Hip Nailing/yes Intramedullary janneth into left hip/yes Insertion of locking intramedullary janneth into left hip/yes

## 2024-04-17 NOTE — PROGRESS NOTE ADULT - ASSESSMENT
73-year-old female from home ambulates with a walker at baseline with PMHx of HTN, COPD (not on O2 at home), current smoker with 60 ppy hx, hypothyroidism, spinal stenosis, urinary incontinence, presenting with left-sided lower back and hip pain. CTH and cervical spine shows No gross acute intracranial hemorrhage.  CT pelvis shows acute proximal left femoral fracture. Pt is admitted for L hip fracture and COPD exacerbation 2/2 PNA.  4/17: Pt now POD # 1 Left hip IM nailing

## 2024-04-17 NOTE — DIETITIAN INITIAL EVALUATION ADULT - ADD RECOMMEND
PCM (severe), BMI<19. Maintain Regular diet. Menu selection (pt has declined Ensures)/ Accepts fruit yogurt. MD to monitor. RD available.

## 2024-04-17 NOTE — DIETITIAN INITIAL EVALUATION ADULT - PROBLEM SELECTOR PLAN 4
pt states that she lost about 30 lbs in the last 2 months  She is undergoing colonoscopy o/p to evaluate for weight loss  pt denies any blood in the stool  family history of colon ca in her father  f/u CA markers  pt to continue following o/p with colonoscopy  f/u CT C/A/P w IV con r/o malignancy

## 2024-04-17 NOTE — PHYSICAL THERAPY INITIAL EVALUATION ADULT - PERTINENT HX OF CURRENT PROBLEM, REHAB EVAL
73-year-old female from home ambulates with a walker at baseline with PMH of HTN, COPD ( not on O2 at home), current smoker with 60 ppy hx, hypothyroidism, spinal stenosis urinary incontinence, presenting with left-sided lower back and hip pain after fall 2 weeks ago. Approximately 1-2 weeks ago the patient fell while walking and her leg "gave out" and since then she has had left-sided leg pain.  Pt also reports difficulty with bed mobility within the past 6 months and that she hit her R cheek when trying to get out of bed few weeks prior to admission.

## 2024-04-17 NOTE — PHYSICAL THERAPY INITIAL EVALUATION ADULT - DIAGNOSIS, PT EVAL
(ICF Model) Pt. present w/deficits in Body Structures/Function (Impairments), incl: Strength, Balance, Endurance, ROM and pain, leading to deficits in performing the below noted Activities (Limitations) such as bed mobility, transfers and ambulation.

## 2024-04-18 LAB
ANION GAP SERPL CALC-SCNC: 5 MMOL/L — SIGNIFICANT CHANGE UP (ref 5–17)
BUN SERPL-MCNC: 18 MG/DL — SIGNIFICANT CHANGE UP (ref 7–18)
CALCIUM SERPL-MCNC: 8.2 MG/DL — LOW (ref 8.4–10.5)
CHLORIDE SERPL-SCNC: 100 MMOL/L — SIGNIFICANT CHANGE UP (ref 96–108)
CO2 SERPL-SCNC: 31 MMOL/L — SIGNIFICANT CHANGE UP (ref 22–31)
CREAT SERPL-MCNC: 0.5 MG/DL — SIGNIFICANT CHANGE UP (ref 0.5–1.3)
EGFR: 98 ML/MIN/1.73M2 — SIGNIFICANT CHANGE UP
GLUCOSE SERPL-MCNC: 110 MG/DL — HIGH (ref 70–99)
HCT VFR BLD CALC: 32.6 % — LOW (ref 34.5–45)
HGB BLD-MCNC: 10.5 G/DL — LOW (ref 11.5–15.5)
MCHC RBC-ENTMCNC: 31.1 PG — SIGNIFICANT CHANGE UP (ref 27–34)
MCHC RBC-ENTMCNC: 32.2 GM/DL — SIGNIFICANT CHANGE UP (ref 32–36)
MCV RBC AUTO: 96.4 FL — SIGNIFICANT CHANGE UP (ref 80–100)
NRBC # BLD: 0 /100 WBCS — SIGNIFICANT CHANGE UP (ref 0–0)
PLATELET # BLD AUTO: 389 K/UL — SIGNIFICANT CHANGE UP (ref 150–400)
POTASSIUM SERPL-MCNC: 3.5 MMOL/L — SIGNIFICANT CHANGE UP (ref 3.5–5.3)
POTASSIUM SERPL-SCNC: 3.5 MMOL/L — SIGNIFICANT CHANGE UP (ref 3.5–5.3)
RBC # BLD: 3.38 M/UL — LOW (ref 3.8–5.2)
RBC # FLD: 13.3 % — SIGNIFICANT CHANGE UP (ref 10.3–14.5)
SODIUM SERPL-SCNC: 136 MMOL/L — SIGNIFICANT CHANGE UP (ref 135–145)
WBC # BLD: 10.34 K/UL — SIGNIFICANT CHANGE UP (ref 3.8–10.5)
WBC # FLD AUTO: 10.34 K/UL — SIGNIFICANT CHANGE UP (ref 3.8–10.5)

## 2024-04-18 RX ORDER — SENNA PLUS 8.6 MG/1
2 TABLET ORAL
Qty: 0 | Refills: 0 | DISCHARGE
Start: 2024-04-18

## 2024-04-18 RX ORDER — OXYCODONE HYDROCHLORIDE 5 MG/1
1 TABLET ORAL
Qty: 0 | Refills: 0 | DISCHARGE
Start: 2024-04-18

## 2024-04-18 RX ORDER — POLYETHYLENE GLYCOL 3350 17 G/17G
17 POWDER, FOR SOLUTION ORAL
Qty: 0 | Refills: 0 | DISCHARGE
Start: 2024-04-18

## 2024-04-18 RX ORDER — ALPRAZOLAM 0.25 MG
1 TABLET ORAL
Refills: 0 | DISCHARGE

## 2024-04-18 RX ORDER — ENOXAPARIN SODIUM 100 MG/ML
40 INJECTION SUBCUTANEOUS
Qty: 0 | Refills: 0 | DISCHARGE
Start: 2024-04-18

## 2024-04-18 RX ADMIN — Medication 3 MILLILITER(S): at 15:53

## 2024-04-18 RX ADMIN — POLYETHYLENE GLYCOL 3350 17 GRAM(S): 17 POWDER, FOR SOLUTION ORAL at 13:10

## 2024-04-18 RX ADMIN — LIDOCAINE 2 PATCH: 4 CREAM TOPICAL at 13:10

## 2024-04-18 RX ADMIN — OXYCODONE HYDROCHLORIDE 10 MILLIGRAM(S): 5 TABLET ORAL at 14:00

## 2024-04-18 RX ADMIN — ENOXAPARIN SODIUM 40 MILLIGRAM(S): 100 INJECTION SUBCUTANEOUS at 09:12

## 2024-04-18 RX ADMIN — LIDOCAINE 2 PATCH: 4 CREAM TOPICAL at 20:02

## 2024-04-18 RX ADMIN — Medication 1 ENEMA: at 19:55

## 2024-04-18 RX ADMIN — SENNA PLUS 2 TABLET(S): 8.6 TABLET ORAL at 20:35

## 2024-04-18 RX ADMIN — Medication 1 MILLIGRAM(S): at 15:21

## 2024-04-18 RX ADMIN — Medication 3 MILLILITER(S): at 08:49

## 2024-04-18 RX ADMIN — Medication 5 MILLIGRAM(S): at 13:11

## 2024-04-18 RX ADMIN — OXYCODONE HYDROCHLORIDE 5 MILLIGRAM(S): 5 TABLET ORAL at 21:32

## 2024-04-18 RX ADMIN — Medication 3 MILLILITER(S): at 03:10

## 2024-04-18 RX ADMIN — OXYCODONE HYDROCHLORIDE 10 MILLIGRAM(S): 5 TABLET ORAL at 10:00

## 2024-04-18 RX ADMIN — CYCLOBENZAPRINE HYDROCHLORIDE 5 MILLIGRAM(S): 10 TABLET, FILM COATED ORAL at 13:10

## 2024-04-18 RX ADMIN — Medication 20 MILLIGRAM(S): at 06:06

## 2024-04-18 RX ADMIN — Medication 3 MILLILITER(S): at 20:46

## 2024-04-18 RX ADMIN — MORPHINE SULFATE 15 MILLIGRAM(S): 50 CAPSULE, EXTENDED RELEASE ORAL at 17:05

## 2024-04-18 RX ADMIN — Medication 1 MILLIGRAM(S): at 02:07

## 2024-04-18 RX ADMIN — Medication 112 MICROGRAM(S): at 06:06

## 2024-04-18 RX ADMIN — OXYCODONE HYDROCHLORIDE 10 MILLIGRAM(S): 5 TABLET ORAL at 09:12

## 2024-04-18 RX ADMIN — OXYCODONE HYDROCHLORIDE 10 MILLIGRAM(S): 5 TABLET ORAL at 13:11

## 2024-04-18 RX ADMIN — LOSARTAN POTASSIUM 50 MILLIGRAM(S): 100 TABLET, FILM COATED ORAL at 06:06

## 2024-04-18 RX ADMIN — OXYCODONE HYDROCHLORIDE 5 MILLIGRAM(S): 5 TABLET ORAL at 22:32

## 2024-04-18 NOTE — PROGRESS NOTE ADULT - NS ATTEND AMEND GEN_ALL_CORE FT
Impression: This is a 73 yr old woman  with s/p multiple Falls at Home and sustained Left Hip fracture . Presented with complains of episode of SOB , hypoxia in ED with o2 saturation 80s room air due to Acute hypoxic respiratory failure secondary to combination of Right lung Pneumonia showing on CT chest   and COPD / emphysema exacerbation , Active smoker . Negative swab for RVP, Covid. Negative UA for legionella .     Suggestion:  - Pat is moderate pulmonary risk for proposed hip surgery  - Pat is medically optimize from pulmonary point of view    - O2 saturation 94% room air at rest . So far no SOB , room air . Can have Oxygen supplementation 2L NC if needed  .  - Continue Duoneb via nebulization Q 6 hours . Do not give at the same time with PRN Albuterol 90 mcg 2 puffs Q 6 Hours.   - Continue Symbicort 160- 4.5 mcg 2 puffs Twice daily. using Wixela Twice daily outpatient .   - On Zosyn 3.375 Gm IVPB Q 8 Hours .  - Orthopedic following .   - Reinforced the importance of smoking cessation  ( is a smoker too ) , may not quit but willing to continue  the Nicotine patch   - On nicotine patch on x 12 hours.   - She has moderate perioperative risk with Ariscat pulmonary assessment .
Impression: This is a 72 Y/O Female with s/p multiple Falls at Home and sustained Left Hip fracture . Presented with complains of episode of SOB , hypoxia in ED with o2 saturation 80s room air due to Acute hypoxic respiratory failure secondary to combination of Right lung Pneumonia showing on CT chest   and COPD exacerbation , Active smoker . Negative swab for RVP, Covid. Negative UA for legionella .     Suggestion:  O2 saturation 93% room air at rest . Can have Oxygen supplementation 2L NC .  Continue Duoneb via nebulization Q 6 hours . Do not give at the same time with PRN Albuterol 90 mcg 2 puffs Q 6 Hours.   Continue Symbicort 160- 4.5 mcg 2 puffs Twice daily. using Wixela Twice daily outpatient .   On Zosyn 3.375 Gm IVPB Q 8 Hours .  Continue Prednisone 40 mg Oral daily x 5 days then taper to 20 mg Oral Daily x 5 days.   Orthopedic following .   Reinforced the importance of smoking cessation  ( is a smoker too )   , On nicotine patch on x 12 hours.
Impression: This is a 74 Y/O Female with s/p multiple Falls at Home and sustained Left Hip fracture . Presented with complains of episode of SOB , hypoxia in ED with o2 saturation 80s room air due to Acute hypoxic respiratory failure secondary to combination of Right lung Pneumonia showing on CT chest   and COPD exacerbation , Active smoker . Negative swab for  Covid. Negative UA for legionella .     Suggestion:  O2 saturation 97% room air at rest . So far no SOB , room air . Can have Oxygen supplementation 2L NC if needed  .  Continue Duoneb via nebulization Q 6 hours . Do not give at the same time with PRN Albuterol 90 mcg 2 puffs Q 6 Hours.   On Prednisone 20 mg Oral daily.   Orthopedic following .   Isolation: Droplet / Contact for Adenovirus .

## 2024-04-18 NOTE — PROGRESS NOTE ADULT - ASSESSMENT
Patient is a 73y old  Female who is from home ambulates with a walker at baseline with PMH of HTN, COPD ( not on O2 at home), current smoker with 60 ppy hx, hypothyroidism, spinal stenosis urinary incontinence, now presents to the ER for evaluation of left-sided lower back and hip pain after fall 2 weeks ago. Approximately 1-2 weeks ago the patient fell while walking and her leg "gave out" and since then she has had left-sided leg pain.  Patient reports she is able to walk with her cane/walker but feels very weak and unbalanced and has since fallen 2 more times. Pt also complains of SOB and chest pain since the fall today after her cane hit her chest.  On admission, she has no fever but found to be hypoxic on RA to 80s saturating well on 1-2 L NC. The CT chest shows Dominant cluster of nodular and patchy opacities within the superior segment of the right lower lobe suggestive of infection with endobronchial spread. Emphysema. Intrahepatic and extrahepatic biliary ductal dilatation is of unclear etiology. CT pelvis shows Acute proximal left femoral fracture,  Age-indeterminate severe compression fracture of the L5 vertebral body. Pt is admitted for L hip fracture/ SOB 2/2 PNA vs COPD exacerbation. She has started on Zosyn and the ID consult requested to assist with further evaluation and antibiotic management.    # Pneumonia - Legionella urine Ag and MRSA PCR is negative   # Left femoral fracture, s/p Fall- s/p Left Hip IM nailing on 4/16/24   # positive Adenovirus   # Leukocytosis - 4/16- resolved    would recommend :    1. Monitor OFF Abx as she completed the course  2, Supportive care for Adenovirus  3. Aspiration precaution  4. Pain management as needed    d/w Covering Leatha BREWSTER and patient     Attending Attestation:    Spent more than 35 minutes on total encounter, more than 50 % of the visit was spent counseling and/or coordinating care by the Attending physician.

## 2024-04-18 NOTE — PROGRESS NOTE ADULT - PROBLEM SELECTOR PROBLEM 1
Left hip pain
Hip fracture, left
Left hip pain
Hip fracture, left
Left hip pain
Hip fracture, left

## 2024-04-18 NOTE — PROGRESS NOTE ADULT - PROBLEM SELECTOR PROBLEM 12
Discharge planning issues
Discharge planning issues
Prophylactic measure
Discharge planning issues
Prophylactic measure
Prophylactic measure
Discharge planning issues
Prophylactic measure
Discharge planning issues
Prophylactic measure
Prophylactic measure

## 2024-04-18 NOTE — PROGRESS NOTE ADULT - SUBJECTIVE AND OBJECTIVE BOX
Time of Visit:  Patient seen and examined.     MEDICATIONS  (STANDING):  albuterol/ipratropium for Nebulization 3 milliLiter(s) Nebulizer every 6 hours  enoxaparin Injectable 40 milliGRAM(s) SubCutaneous every 24 hours  levothyroxine 112 MICROGram(s) Oral daily  lidocaine   4% Patch 2 Patch Transdermal daily  losartan 50 milliGRAM(s) Oral daily  morphine ER Tablet 15 milliGRAM(s) Oral every 12 hours  naloxone Injectable 0.4 milliGRAM(s) IV Push once  nicotine -   7 mG/24Hr(s) Patch 1 Patch Transdermal daily  polyethylene glycol 3350 17 Gram(s) Oral daily  predniSONE   Tablet 20 milliGRAM(s) Oral daily  senna 2 Tablet(s) Oral at bedtime      MEDICATIONS  (PRN):  acetaminophen     Tablet .. 1000 milliGRAM(s) Oral every 8 hours PRN Mild Pain (1 - 3)  albuterol    90 MICROgram(s) HFA Inhaler 2 Puff(s) Inhalation every 6 hours PRN Bronchospasm  ALPRAZolam 1 milliGRAM(s) Oral every 12 hours PRN Anxiety  benzonatate 100 milliGRAM(s) Oral every 8 hours PRN Cough  bisacodyl 5 milliGRAM(s) Oral daily PRN Constipation  cyclobenzaprine 5 milliGRAM(s) Oral every 8 hours PRN Spasm  oxyCODONE    IR 5 milliGRAM(s) Oral every 4 hours PRN Moderate Pain (4 - 6)  oxyCODONE    IR 10 milliGRAM(s) Oral every 4 hours PRN Severe Pain (7 - 10)       Medications up to date at time of exam.    ROS; No fever, chills, cough, congestion on exam.   PHYSICAL EXAMINATION:    Vital Signs Last 24 Hrs  T(C): 36.7 (18 Apr 2024 05:15), Max: 36.7 (18 Apr 2024 05:15)  T(F): 98 (18 Apr 2024 05:15), Max: 98 (18 Apr 2024 05:15)  HR: 78 (18 Apr 2024 05:15) (78 - 108)  BP: 127/82 (18 Apr 2024 05:15) (127/82 - 170/107)  BP(mean): 93 (17 Apr 2024 13:30) (93 - 93)  RR: 17 (18 Apr 2024 05:15) (17 - 20)  SpO2: 97% (18 Apr 2024 05:15) (95% - 97%)    Parameters below as of 18 Apr 2024 05:15  Patient On (Oxygen Delivery Method): room air       General: Alert and oriented. Able to answer question with no SOB. No acute distress .     HEENT: Head is normocephalic and atraumatic. Extraocular muscles are intact. Mucous membranes are moist.     NECK: Supple, no palpable adenopathy.    LUNGS: Clear to auscultation, no wheezing, rales, or rhonchi.    HEART: Regular rate and rhythm without murmur.    ABDOMEN: Soft, nontender, and nondistended.  Active bowel sounds.     : No bladder distention and tenderness .     NEUROLOGIC: Awake, alert, oriented.     SKIN: Warm and moist .  Non diaphoretic.        LABS:                        10.5   10.34 )-----------( 389      ( 18 Apr 2024 07:19 )             32.6     04-18    136  |  100  |  18  ----------------------------<  110<H>  3.5   |  31  |  0.50    Ca    8.2<L>      18 Apr 2024 07:19    TPro  6.3  /  Alb  2.8<L>  /  TBili  0.4  /  DBili  x   /  AST  18  /  ALT  19  /  AlkPhos  124<H>  04-17      Urinalysis Basic - ( 18 Apr 2024 07:19 )    Color: x / Appearance: x / SG: x / pH: x  Gluc: 110 mg/dL / Ketone: x  / Bili: x / Urobili: x   Blood: x / Protein: x / Nitrite: x   Leuk Esterase: x / RBC: x / WBC x   Sq Epi: x / Non Sq Epi: x / Bacteria: x    Procalcitonin, Serum: 0.08 ng/mL (04-16-24 @ 07:06)      MICROBIOLOGY: (if applicable)    RADIOLOGY & ADDITIONAL STUDIES:  EKG:   CT: < from: CT Chest No Cont (04.09.24 @ 17:06) >    ACC: [id number] EXAM:  CT CHEST   ORDERED BY: SAMEER KLEIN     PROCEDURE DATE:  04/09/2024          INTERPRETATION:  CLINICAL INDICATION: CHEST PAIN, STATUS POST FALL,   SHORTNESS OF BREATH.    Axial CT images of the chest are obtained without intravenous   administration of contrast.    No prior chest CTs are available for comparison.    No enlarged axillary, mediastinal or hilar lymph nodes.    No pleural or pericardial effusions. Heart size is normal. Mitral annular   calcifications. Aortic intimal calcifications. Aorta is tortuous.    Evaluation of the upper abdomen demonstrate intrahepatic and extrahepatic   biliary ductal dilatation. For reference the partially imaged common bile   duct measures about 1 cm.    Evaluation of the lungs demonstrate emphysema. No pneumothorax. Impaction   of some of the bilateral lower lung segmental and subsegmental airways   likely due to internal secretions. Mild bilateral lower lung   intraparenchymal airways wall thickening, a nonspecific findingmay be   related to the history of smoking.    Right upper lobe apical branching linear ill-defined opacity containing   internal foci of calcification, part of which measures about 2.4 x 1.5 cm   may be due to scarring.    Cluster of a few ill-defined nodular and patchy opacities within the   superior segment of the right lower lobe largest measuring about 2.2 cm.   Other scattered bilateral nodular opacities are also noted with 1 of the   larger 1's in the right lower lobe in a para-aortic location measuring   about 6 mm.    No central endobronchial lesions.    Degenerative changes of the spine spine. Bones are osteopenic.    Loss of height of multiple thoracic and lumbar vertebral bodies which are   of indeterminate age. For reference there is severe loss of height of the   T7 and moderate loss of height of the T6 and T9 vertebral bodies. Spinal   kyphosis.    IMPRESSION: Loss of height of multiple thoracic and lumbar spine   vertebral bodies which are of indeterminate age. Clinical correlation for   back pain is recommended. Dedicated spinal MRI can be performed for   complete evaluation.    Dominant cluster of nodular and patchy opacities within the superior   segment of the right lower lobe suggestive of infection with   endobronchial spread. A 1-3 month follow-up noncontrast chest CT is   recommended to ensure improvement/resolution.    Emphysema.    Intrahepatic and extrahepatic biliary ductal dilatation is of unclear   etiology. Upper abdominal ultrasound can be performedfor further   evaluation as clinically warranted.    --- End of Report ---            GUERO KNIGHT MD; Attending Radiologist  This document has been electronically signed. Apr 9 2024  5:20PM    < end of copied text >     ECHO:    IMPRESSION: 73y Female PAST MEDICAL & SURGICAL HISTORY:  HTN (hypertension)      Hypothyroidism      COPD (chronic obstructive pulmonary disease)      Spinal stenosis      Current smoker      COPD with pneumonia      H/O foot surgery       Impression: This is a 72 Y/O Female with s/p multiple Falls at Home and sustained Left Hip fracture . Presented with complains of episode of SOB , hypoxia in ED with o2 saturation 80s room air due to Acute hypoxic respiratory failure secondary to combination of Right lung Pneumonia showing on CT chest   and COPD exacerbation , Active smoker . Negative swab for  Covid. Negative UA for legionella .     Suggestion:  O2 saturation 97% room air at rest . So far no SOB , room air . Can have Oxygen supplementation 2L NC if needed  .  Continue Duoneb via nebulization Q 6 hours . Do not give at the same time with PRN Albuterol 90 mcg 2 puffs Q 6 Hours.   On Prednisone 20 mg Oral daily.   Orthopedic following .   Isolation: Droplet / Contact for Adenovirus .

## 2024-04-18 NOTE — PROGRESS NOTE ADULT - PROBLEM SELECTOR PROBLEM 2
Acute respiratory failure with hypoxia
Hip fracture, left
Acute respiratory failure with hypoxia
Hip fracture, left
Acute respiratory failure with hypoxia
Hip fracture, left
Acute respiratory failure with hypoxia

## 2024-04-18 NOTE — PROGRESS NOTE ADULT - ASSESSMENT
73-year-old female from home ambulates with a walker at baseline with PMHx of HTN, COPD (not on O2 at home), current smoker with 60 ppy hx, hypothyroidism, spinal stenosis, urinary incontinence, presenting with left-sided lower back and hip pain. CTH and cervical spine shows No gross acute intracranial hemorrhage.  CT pelvis shows acute proximal left femoral fracture. Pt is admitted for L hip fracture and COPD exacerbation 2/2 PNA.  4/17: Pt now POD # 1 Left hip IM nailing   73-year-old female from home ambulates with a walker at baseline with PMHx of HTN, COPD (not on O2 at home), current smoker with 60 ppy hx, hypothyroidism, spinal stenosis, urinary incontinence, presenting with left-sided lower back and hip pain. CTH and cervical spine shows No gross acute intracranial hemorrhage.  CT pelvis shows acute proximal left femoral fracture. Pt is admitted for L hip fracture and COPD exacerbation 2/2 PNA.  s/p Left hip IM nailing 4/16, occasional coughing but SOB or hypoxic. Eli removed last night at 8pm, pt could not  void herself, straight cath at 3:30pm was 800cc will perform q 8hrs of bladder scan for now.  dispo planning is underway

## 2024-04-18 NOTE — PROGRESS NOTE ADULT - SUBJECTIVE AND OBJECTIVE BOX
73yFemale    Diagnosis:  S/p IM nailing Left hip fx POD#2    24hr interval/acute events:  Pt complaint of cough. Patient notes that cough is not improved since yesterday. Patient tested positive for adenovirus on 4/16/24.  Pain is moderate; localized to the LLE and well controlled with medication.  Denies cp, sob, palpitations, paresthesias, nvd.    Vital Signs Last 24 Hrs  T(C): 36.7 (18 Apr 2024 05:15), Max: 36.7 (18 Apr 2024 05:15)  T(F): 98 (18 Apr 2024 05:15), Max: 98 (18 Apr 2024 05:15)  HR: 78 (18 Apr 2024 05:15) (78 - 108)  BP: 127/82 (18 Apr 2024 05:15) (127/82 - 170/107)  BP(mean): 93 (17 Apr 2024 13:30) (93 - 93)  RR: 17 (18 Apr 2024 05:15) (17 - 20)  SpO2: 97% (18 Apr 2024 05:15) (95% - 97%)    Parameters below as of 18 Apr 2024 05:15  Patient On (Oxygen Delivery Method): room air        Physical Exam:    General: AAOx3, in NAD, resting comfortably in bed.  Left hip:    Skin pink, warm LLE.  In nl. alignment.   Dressing is C/D/I.    No ct, calves soft  2+pulses  NVI silt  5/5 strength ehl/ta/gs b/l.                                               10.5   10.34 )-----------( 389      ( 18 Apr 2024 07:19 )             32.6       04-18    136  |  100  |  18  ----------------------------<  110<H>  3.5   |  31  |  0.50    Ca    8.2<L>      18 Apr 2024 07:19    TPro  6.3  /  Alb  2.8<L>  /  TBili  0.4  /  DBili  x   /  AST  18  /  ALT  19  /  AlkPhos  124<H>  04-17        Impression:  73yFemale S/p IM nailing Left hip fx POD#2  Plan:  -  Continue pain management and home meds  -  DVT prophylaxis with Lovenox  -  Daily Physical Therapy: WBAT on LLE with appropriate assistive device  -  Discharge planning: Rehab pending  -  Encouraged use of Incentive Spirometry   -  Albumin low, patient refused vitamin supplementation.   -  Case d/w Dr. Lang

## 2024-04-18 NOTE — PROGRESS NOTE ADULT - SUBJECTIVE AND OBJECTIVE BOX
NP Note discussed with  Primary Attending    Patient is a 73y old  Female who presents with a chief complaint of L hip fracture (18 Apr 2024 13:23)      INTERVAL HPI/OVERNIGHT EVENTS: no new complaints    MEDICATIONS  (STANDING):  albuterol/ipratropium for Nebulization 3 milliLiter(s) Nebulizer every 6 hours  enoxaparin Injectable 40 milliGRAM(s) SubCutaneous every 24 hours  levothyroxine 112 MICROGram(s) Oral daily  lidocaine   4% Patch 2 Patch Transdermal daily  losartan 50 milliGRAM(s) Oral daily  morphine ER Tablet 15 milliGRAM(s) Oral every 12 hours  naloxone Injectable 0.4 milliGRAM(s) IV Push once  nicotine -   7 mG/24Hr(s) Patch 1 Patch Transdermal daily  polyethylene glycol 3350 17 Gram(s) Oral daily  predniSONE   Tablet 20 milliGRAM(s) Oral daily  senna 2 Tablet(s) Oral at bedtime    MEDICATIONS  (PRN):  acetaminophen     Tablet .. 1000 milliGRAM(s) Oral every 8 hours PRN Mild Pain (1 - 3)  albuterol    90 MICROgram(s) HFA Inhaler 2 Puff(s) Inhalation every 6 hours PRN Bronchospasm  ALPRAZolam 1 milliGRAM(s) Oral every 12 hours PRN Anxiety  benzonatate 100 milliGRAM(s) Oral every 8 hours PRN Cough  bisacodyl 5 milliGRAM(s) Oral daily PRN Constipation  cyclobenzaprine 5 milliGRAM(s) Oral every 8 hours PRN Spasm  oxyCODONE    IR 5 milliGRAM(s) Oral every 4 hours PRN Moderate Pain (4 - 6)  oxyCODONE    IR 10 milliGRAM(s) Oral every 4 hours PRN Severe Pain (7 - 10)      __________________________________________________  REVIEW OF SYSTEMS:    CONSTITUTIONAL: No fever,   EYES: no acute visual disturbances  NECK: No pain or stiffness  RESPIRATORY: No cough; No shortness of breath  CARDIOVASCULAR: No chest pain, no palpitations  GASTROINTESTINAL: No pain. No nausea or vomiting; No diarrhea   NEUROLOGICAL: No headache or numbness, no tremors  MUSCULOSKELETAL: No joint pain, no muscle pain  GENITOURINARY: no dysuria, no frequency, no hesitancy  PSYCHIATRY: no depression , no anxiety  ALL OTHER  ROS negative        Vital Signs Last 24 Hrs  T(C): 36.7 (18 Apr 2024 05:15), Max: 36.7 (18 Apr 2024 05:15)  T(F): 98 (18 Apr 2024 05:15), Max: 98 (18 Apr 2024 05:15)  HR: 103 (18 Apr 2024 12:18) (78 - 108)  BP: 141/98 (18 Apr 2024 12:18) (127/82 - 142/89)  BP(mean): --  RR: 17 (18 Apr 2024 05:15) (17 - 18)  SpO2: 98% (18 Apr 2024 12:18) (96% - 99%)    Parameters below as of 18 Apr 2024 12:18  Patient On (Oxygen Delivery Method): nasal cannula  O2 Flow (L/min): 2      ________________________________________________  PHYSICAL EXAM:  GENERAL: NAD  HEENT: Normocephalic;  conjunctivae and sclerae clear; moist mucous membranes;   NECK : supple  CHEST/LUNG: Clear to auscultation bilaterally with good air entry   HEART: S1 S2  regular; no murmurs, gallops or rubs  ABDOMEN: Soft, Nontender, Nondistended; Bowel sounds present  EXTREMITIES: no cyanosis; no edema; no calf tenderness  SKIN: warm and dry; no rash  NERVOUS SYSTEM:  Awake and alert; Oriented  to place, person and time ; no new deficits    _________________________________________________  LABS:                        10.5   10.34 )-----------( 389      ( 18 Apr 2024 07:19 )             32.6     04-18    136  |  100  |  18  ----------------------------<  110<H>  3.5   |  31  |  0.50    Ca    8.2<L>      18 Apr 2024 07:19    TPro  6.3  /  Alb  2.8<L>  /  TBili  0.4  /  DBili  x   /  AST  18  /  ALT  19  /  AlkPhos  124<H>  04-17      Urinalysis Basic - ( 18 Apr 2024 07:19 )    Color: x / Appearance: x / SG: x / pH: x  Gluc: 110 mg/dL / Ketone: x  / Bili: x / Urobili: x   Blood: x / Protein: x / Nitrite: x   Leuk Esterase: x / RBC: x / WBC x   Sq Epi: x / Non Sq Epi: x / Bacteria: x      CAPILLARY BLOOD GLUCOSE            RADIOLOGY & ADDITIONAL TESTS:    Imaging Personally Reviewed:  YES/NO    Consultant(s) Notes Reviewed:   YES/ No    Care Discussed with Consultants :     Plan of care was discussed with patient and /or primary care giver; all questions and concerns were addressed and care was aligned with patient's wishes.     NP Note discussed with  Primary Attending    Patient is a 73y old  Female who presents with a chief complaint of L hip fracture (18 Apr 2024 13:23)      INTERVAL HPI/OVERNIGHT EVENTS: no new complaints    MEDICATIONS  (STANDING):  albuterol/ipratropium for Nebulization 3 milliLiter(s) Nebulizer every 6 hours  enoxaparin Injectable 40 milliGRAM(s) SubCutaneous every 24 hours  levothyroxine 112 MICROGram(s) Oral daily  lidocaine   4% Patch 2 Patch Transdermal daily  losartan 50 milliGRAM(s) Oral daily  morphine ER Tablet 15 milliGRAM(s) Oral every 12 hours  naloxone Injectable 0.4 milliGRAM(s) IV Push once  nicotine -   7 mG/24Hr(s) Patch 1 Patch Transdermal daily  polyethylene glycol 3350 17 Gram(s) Oral daily  predniSONE   Tablet 20 milliGRAM(s) Oral daily  senna 2 Tablet(s) Oral at bedtime    MEDICATIONS  (PRN):  acetaminophen     Tablet .. 1000 milliGRAM(s) Oral every 8 hours PRN Mild Pain (1 - 3)  albuterol    90 MICROgram(s) HFA Inhaler 2 Puff(s) Inhalation every 6 hours PRN Bronchospasm  ALPRAZolam 1 milliGRAM(s) Oral every 12 hours PRN Anxiety  benzonatate 100 milliGRAM(s) Oral every 8 hours PRN Cough  bisacodyl 5 milliGRAM(s) Oral daily PRN Constipation  cyclobenzaprine 5 milliGRAM(s) Oral every 8 hours PRN Spasm  oxyCODONE    IR 5 milliGRAM(s) Oral every 4 hours PRN Moderate Pain (4 - 6)  oxyCODONE    IR 10 milliGRAM(s) Oral every 4 hours PRN Severe Pain (7 - 10)      __________________________________________________  REVIEW OF SYSTEMS:    CONSTITUTIONAL: No fever,   EYES: no acute visual disturbances  NECK: No pain or stiffness  RESPIRATORY:  + cough; No shortness of breath  CARDIOVASCULAR: No chest pain, no palpitations  GASTROINTESTINAL: No pain. No nausea or vomiting; No diarrhea   NEUROLOGICAL: No headache or numbness, no tremors  MUSCULOSKELETAL: No joint pain, no muscle pain  GENITOURINARY: no dysuria, no frequency, no hesitancy  PSYCHIATRY: no depression , no anxiety  ALL OTHER  ROS negative        Vital Signs Last 24 Hrs  T(C): 36.7 (18 Apr 2024 05:15), Max: 36.7 (18 Apr 2024 05:15)  T(F): 98 (18 Apr 2024 05:15), Max: 98 (18 Apr 2024 05:15)  HR: 103 (18 Apr 2024 12:18) (78 - 108)  BP: 141/98 (18 Apr 2024 12:18) (127/82 - 142/89)  BP(mean): --  RR: 17 (18 Apr 2024 05:15) (17 - 18)  SpO2: 98% (18 Apr 2024 12:18) (96% - 99%)    Parameters below as of 18 Apr 2024 12:18  Patient On (Oxygen Delivery Method): nasal cannula  O2 Flow (L/min): 2      ________________________________________________  PHYSICAL EXAM:  GENERAL: NAD, cachetic   HEENT: Normocephalic;  conjunctivae and sclerae clear; moist mucous membranes;   NECK : supple  CHEST/LUNG: cough, diminished breath sound to lung base   HEART: S1 S2  regular; no murmurs, gallops or rubs  ABDOMEN: Soft, Nontender, Nondistended; Bowel sounds present  EXTREMITIES: no cyanosis; no edema; no calf tenderness  SKIN: warm and dry; no rash  NERVOUS SYSTEM:  Awake and alert; Oriented  to place, person and time ; no new deficits    _________________________________________________  LABS:                        10.5   10.34 )-----------( 389      ( 18 Apr 2024 07:19 )             32.6     04-18    136  |  100  |  18  ----------------------------<  110<H>  3.5   |  31  |  0.50    Ca    8.2<L>      18 Apr 2024 07:19    TPro  6.3  /  Alb  2.8<L>  /  TBili  0.4  /  DBili  x   /  AST  18  /  ALT  19  /  AlkPhos  124<H>  04-17      Urinalysis Basic - ( 18 Apr 2024 07:19 )    Color: x / Appearance: x / SG: x / pH: x  Gluc: 110 mg/dL / Ketone: x  / Bili: x / Urobili: x   Blood: x / Protein: x / Nitrite: x   Leuk Esterase: x / RBC: x / WBC x   Sq Epi: x / Non Sq Epi: x / Bacteria: x      CAPILLARY BLOOD GLUCOSE            RADIOLOGY & ADDITIONAL TESTS:  < from: MR Thoracic Spine No Cont (04.11.24 @ 19:15) >    ACC: [id number] EXAM:  MR BRAIN   ORDERED BY: TAMMI CHRISTIE     ACC: [id number] EXAM:  MR SPINE THORACIC   ORDERED BY: TAMMI CHRISTIE     ACC: [id number] EXAM:  MR SPINE CERVICAL   ORDERED BY: TAMMI CHRISTIE     PROCEDURE DATE:  04/11/2024          INTERPRETATION:  Three examinations were performed in this patient:  1.  MR of the brain without gadolinium contrast  2.  MR cervical spine without gadolinium contrast  3.  MR thoracic spine without gadolinium contrast    CLINICAL INFORMATION: Post fall trauma cord compression  HMR  Admitting   Dxs: S72.009A FRACTURE OF UNSP PART OF NECK OF UNSP FEMUR, INIT    TECHNIQUE:  1. MR brain:  Sagittal and axial T1-weighted images, axial FLAIR images,   axial susceptibility weighted images, axial T2-weighted images and axial   diffusion weighted images of the brain were obtained.  2.  MR cervical spine:   Sagittal T2-weighted images were obtained.  3.  MR thoracic spine:   Sagittal T2-weighted images were obtained.  The remainder these examinations could not be completed, not tolerated by   the patient. The images that could be obtained are degraded by patient   motion.  CONTRAST:    None    COMPARISON:   CT head 4/10/2023 and CT cervical and CT chest 4/9/2023    FINDINGS:    BRAIN    BRAIN:   The brain demonstrates small focal lesions of remote deep   infarction. The most conspicuous are noted in the external capsules. No   cerebral cortical lesion is recognized.   No diffusion restriction is   found in the brain.  No acute cerebral cortical infarct is found.   No   intracranial hemorrhage is recognized.  No mass effect is found in the   brain.   The brain also demonstrates patchy indistinct lesions within the   deep cerebral hemispheric white matter typical for ischemic white matter   disease.    CSF SPACES:   The ventricles, sulci and basal cisterns appear moderately   dilated reflecting diffuse brain volume loss.    VESSELS:   The vertebral and internal carotid arteries demonstrate   expected flow voids indicating their patency.    HEAD AND NECK STRUCTURES:   The orbits are unremarkable.  Paranasal   sinuses are clear.  The nasal cavity demonstrates left-to-right septal   deviation. Left middle turbinate trsiten bullosa is present..  The central   skull base appears intact.  The nasopharynx is symmetric.  The temporal   bones appear clear of disease.  The calvarium appears unremarkable.    CERVICAL SPINE    Cervical vertebral alignment is significant for grade 1 anterior   listhesis C4 on C5, also slight retrolisthesis C5 on C6.   Facet joints   appear aligned.   Cervical vertebral body heights are maintained.   Cervical vertebral marrow signal intensity mildly heterogeneous from   degenerative endplate changes. These include small marginal osteophytes   most prominently at C5-C6.  No osseous expansion or epidural disease is   found.  No destructive bone lesion is found.   There are osteoarthritic   changes at the articulation of the anterior ring of C1 and the odontoid   process of C2.  This arthropathy includes marginal osteophytes,   subchondral sclerosis and joint space narrowing.    Cervical intervertebral disc spaces demonstrate a generalized pattern of   degeneration characterized by signal intensity loss on the long TR   images. Degenerative disc heightloss appears greatest at C5-C6.   Evaluation of degenerative disc disease is limited in the absence of   axial images.   However, no degenerative high-grade central canal   compromise is recognized.  Neural foramina appear narrowed by disc   materialand uncovertebral joint osteophytes most prominently at the   lower cervical intervertebral disc levels..    Cervical cord morphology Is preserved.  The cervical cord maintains   intact signal intensity   No focal intrinsic cord lesion is identified.   No cord expansion or cord volume loss is recognized.    The visualized adjacent neck structures appear intact.  No neck mass is   recognized.  Paraspinal soft tissues appear intact.  Visualized lymph   nodes appear to be within physiologic size limits.    THORACIC SPINE    Thoracic vertebral alignment demonstrates exaggeration of the mid   thoracic kyphosis. This vertebral malalignment has an apex at the T7   inferior endplate compression fracture. Approximately 70% height loss   results at this level. More limited compression fractures involve the T6   inferior endplate and the T9 superior endplate. Schmorl's nodes result.   More limited superior endplate deformity of T5 and T11. The remaining   thoracic vertebral body heights are maintained. Superior endplate   deformities are also present at L1-L2 and L4. At the L2 vertebral body   burst fracture pattern is present with impression upon the ventral thecal   sac but does not appear to compromise the cauda equina. At the thoracic   vertebral fracture is no high-grade central canal compromise is   recognized. Thoracic vertebral marrow signal intensity relatively   homogeneous without recognition of significant marrow edema.  No   destructive bone lesion, osseous expansion or epidural disease is found.    The paraspinal soft tissues appear intact.    Thoracic intervertebral disc spaces demonstrate a generalized pattern of   at least mild degeneration with signal intensity loss on the long TR   images. Disc heights are largely maintained.   No high-grade degenerative   central canal compromise is recognized. Thoracic neural foramina appear   intact.    The thoracic cord morphology is preserved.  The thoracic cord maintains   intact signal intensity.      IMPRESSION:    1. BRAIN:   Basal ganglia lacunar infarctions. Ischemic white matter   disease and atrophy typical for age    2. CERVICAL SPINE:   Grade 1 anterolisthesis C4 on C5. Low-grade cervical   degenerative disc disease. Cervical cord appears intact.    3. THORACIC SPINE:   Exaggeration in mid thoracic kyphosis with multiple   thoracic endplate deformities that are age indeterminate but felt likely   to be long-standing. Thoracic cord appears intact.    4. Only limited incomplete examinations could be performed on this date.   Recommend completion scans once tolerated by the patient.    --- End of Report ---            GRAZYNA CRAWLEY MD; Attending Radiologist  This document has been electronically signed. Apr 11 2024  8:02PM    < end of copied text >    Imaging Personally Reviewed:  YES/  Consultant(s) Notes Reviewed:   YES/    Care Discussed with Consultants : neuro/ pulm ID and hem/onc     Plan of care was discussed with patient and /or primary care giver; all questions and concerns were addressed and care was aligned with patient's wishes.

## 2024-04-18 NOTE — PROGRESS NOTE ADULT - ASSESSMENT
· Assessment	  ROXY FAN is a 73y Female who presents with a chief complaint of hip fracture    Weight Loss  30lb in 2 years  ·	Patient follows with [facility]  ·	CT abdomen/pelvis not contributory with lack of contrast  ·	chest has infiltrates.  she is long term smoker  ·	Likely PET/CT outpatient.  ·	Will need to see if patient had seen gastroenterology recent for endoscopic examination  ·	No other planned oncologic workup inpatient at this time   multiple compression fractures with pain,  ?kyphoplasy later.    RSV adenovirus  - Patient is currently requiring oxygen and resp treatments.  - Continue supportive care.    Left hip fx  -  Surgical repair 4/16, POD#2.  Tolerated the procedure well  - Cont following surgery recs.      Will continue to follow.

## 2024-04-18 NOTE — PROGRESS NOTE ADULT - SUBJECTIVE AND OBJECTIVE BOX
PATIENT SEEN AND EXAMINED BY SYD TRIPATHI M.D. ON :- 4/18/24  DATE OF SERVICE:     4/18/24        Interim events noted,Labs ,Radiological studies and Cardiology tests reviewed .    Patient is a 73y old  Female who presents with a chief complaint of L hip fracture (18 Apr 2024 14:12)      HPI:  73-year-old female from home ambulates with a walker at baseline with PMH of HTN, COPD ( not on O2 at home), current smoker with 60 ppy hx, hypothyroidism, spinal stenosis urinary incontinence, presenting with left-sided lower back and hip pain after fall 2 weeks ago. Approximately 1-2 weeks ago the patient fell while walking and her leg "gave out" and since then she has had left-sided leg pain.  Patient reports she is able to walk with her cane/walker but feels very weak and unbalanced and has since fallen 2 more times. Pt also complains of SOB. Patient reporting chest pain since the fall today after her cane hit her chest. Patient denies any headache, dizziness, cough, congestion, fever, chills, N,V,D,Abd pain, numbness or tingling. In the ED, pt is comfortable, but complains of pain, NAD, pt is wheezing b/l with R sided crackles, pt was found to be hypoxic on RA to 80s saturating well on 1-2 L NC, hypertensive 175/105, wbc 9, Na 130. CTH and cervical spine shows No gross acute intracranial hemorrhage. No gross acute fracture cervical spine. CTC shows Loss of height of multiple thoracic and lumbar spine vertebral bodies which are of indeterminate age. Dominant cluster of nodular and patchy opacities within the superior segment of the right lower lobe suggestive of infection with endobronchial spread. Emphysema. Intrahepatic and extrahepatic biliary ductal dilatation is of unclear etiology. CT pelvis shows Acute proximal left femoral fracture,  Age-indeterminate severe compression fracture of the L5 vertebral body. Pt is admitted for L hip fracture/ SOB 2/2 PNA vs COPD exacerbation.    Off note: pt states that she lost about 30 lbs in the last 2 months. She is undergoing colonoscopy o/p to evaluate for weight loss. pt denies any blood in the stool. However, she has family history of colon ca in her father.  (09 Apr 2024 19:22)      PAST MEDICAL & SURGICAL HISTORY:  Compression fracture      HTN (hypertension)      Hypothyroidism      COPD (chronic obstructive pulmonary disease)      Spinal stenosis      Current smoker      COPD with pneumonia      No significant past surgical history      H/O foot surgery          PREVIOUS DIAGNOSTIC TESTING:      ECHO  FINDINGS:    STRESS  FINDINGS:    CATHETERIZATION  FINDINGS:    MEDICATIONS  (STANDING):  albuterol/ipratropium for Nebulization 3 milliLiter(s) Nebulizer every 6 hours  enoxaparin Injectable 40 milliGRAM(s) SubCutaneous every 24 hours  levothyroxine 112 MICROGram(s) Oral daily  lidocaine   4% Patch 2 Patch Transdermal daily  losartan 50 milliGRAM(s) Oral daily  morphine ER Tablet 15 milliGRAM(s) Oral every 12 hours  naloxone Injectable 0.4 milliGRAM(s) IV Push once  nicotine -   7 mG/24Hr(s) Patch 1 Patch Transdermal daily  polyethylene glycol 3350 17 Gram(s) Oral daily  predniSONE   Tablet 20 milliGRAM(s) Oral daily  senna 2 Tablet(s) Oral at bedtime    MEDICATIONS  (PRN):  acetaminophen     Tablet .. 1000 milliGRAM(s) Oral every 8 hours PRN Mild Pain (1 - 3)  albuterol    90 MICROgram(s) HFA Inhaler 2 Puff(s) Inhalation every 6 hours PRN Bronchospasm  ALPRAZolam 1 milliGRAM(s) Oral every 12 hours PRN Anxiety  benzonatate 100 milliGRAM(s) Oral every 8 hours PRN Cough  bisacodyl 5 milliGRAM(s) Oral daily PRN Constipation  cyclobenzaprine 5 milliGRAM(s) Oral every 8 hours PRN Spasm  oxyCODONE    IR 5 milliGRAM(s) Oral every 4 hours PRN Moderate Pain (4 - 6)  oxyCODONE    IR 10 milliGRAM(s) Oral every 4 hours PRN Severe Pain (7 - 10)      FAMILY HISTORY:  FH: colon cancer (Father)        SOCIAL HISTORY:    CIGARETTES:    ALCOHOL:    REVIEW OF SYSTEMS:  CONSTITUTIONAL: No fever, weight loss, or fatigue  EYES: No eye pain, visual disturbances, or discharge  ENMT:  No difficulty hearing, tinnitus, vertigo; No sinus or throat pain  NECK: No pain or stiffness  RESPIRATORY: No cough, wheezing, chills or hemoptysis; No shortness of breath  CARDIOVASCULAR: No chest pain, palpitations, dizziness, or leg swelling  GASTROINTESTINAL: No abdominal or epigastric pain. No nausea, vomiting, or hematemesis; No diarrhea or constipation. No melena or hematochezia.  GENITOURINARY: No dysuria, frequency, hematuria, or incontinence  NEUROLOGICAL: No headaches, memory loss, loss of strength, numbness, or tremors  SKIN: No itching, burning, rashes, or lesions   LYMPH NODES: No enlarged glands  ENDOCRINE: No heat or cold intolerance; No hair loss  MUSCULOSKELETAL: No joint pain or swelling; No muscle, back, or extremity pain  PSYCHIATRIC: No depression, anxiety, mood swings, or difficulty sleeping  HEME/LYMPH: No easy bruising, or bleeding gums  ALLERY AND IMMUNOLOGIC: No hives or eczema    Vital Signs Last 24 Hrs  T(C): 36.7 (18 Apr 2024 21:15), Max: 36.7 (18 Apr 2024 05:15)  T(F): 98.1 (18 Apr 2024 21:15), Max: 98.1 (18 Apr 2024 21:15)  HR: 103 (18 Apr 2024 21:15) (78 - 108)  BP: 112/69 (18 Apr 2024 21:15) (112/69 - 142/89)  BP(mean): --  RR: 18 (18 Apr 2024 21:15) (17 - 18)  SpO2: 100% (18 Apr 2024 21:15) (97% - 100%)    Parameters below as of 18 Apr 2024 21:15  Patient On (Oxygen Delivery Method): nasal cannula  O2 Flow (L/min): 2        PHYSICAL EXAM:  GENERAL: NAD, well-groomed, well-developed  HEAD:  Atraumatic, Normocephalic  EYES: EOMI, PERRLA, conjunctiva and sclera clear  ENMT: No tonsillar erythema, exudates, or enlargement; Moist mucous membranes, Good dentition, No lesions  NECK: Supple, No JVD, Normal thyroid  NERVOUS SYSTEM:  Alert & Oriented X3, Good concentration; Motor Strength 5/5 B/L upper and lower extremities; DTRs 2+ intact and symmetric  CHEST/LUNG: Clear to percussion bilaterally; No rales, rhonchi, wheezing, or rubs  HEART: Regular rate and rhythm; No murmurs, rubs, or gallops  ABDOMEN: Soft, Nontender, Nondistended; Bowel sounds present  EXTREMITIES:  2+ Peripheral Pulses, No clubbing, cyanosis, or edema  LYMPH: No lymphadenopathy noted  SKIN: No rashes or lesions      INTERPRETATION OF TELEMETRY:    [facility]    [facility]     LABS:                        10.5   10.34 )-----------( 389      ( 18 Apr 2024 07:19 )             32.6     04-18    136  |  100  |  18  ----------------------------<  110<H>  3.5   |  31  |  0.50    Ca    8.2<L>      18 Apr 2024 07:19    TPro  6.3  /  Alb  2.8<L>  /  TBili  0.4  /  DBili  x   /  AST  18  /  ALT  19  /  AlkPhos  124<H>  04-17          Urinalysis Basic - ( 18 Apr 2024 07:19 )    Color: x / Appearance: x / SG: x / pH: x  Gluc: 110 mg/dL / Ketone: x  / Bili: x / Urobili: x   Blood: x / Protein: x / Nitrite: x   Leuk Esterase: x / RBC: x / WBC x   Sq Epi: x / Non Sq Epi: x / Bacteria: x      Lipid Panel:   I&O's Summary    17 Apr 2024 07:01  -  18 Apr 2024 07:00  --------------------------------------------------------  IN: 0 mL / OUT: 0 mL / NET: 0 mL    18 Apr 2024 07:01  -  18 Apr 2024 21:20  --------------------------------------------------------  IN: 0 mL / OUT: 800 mL / NET: -800 mL        RADIOLOGY & ADDITIONAL STUDIES:  CONCLUSIONS:      1. Left ventricular cavity is normal in size. Left ventricular systolic function is hyperdynamic. There are no regional wall motion abnormalities seen.   2. There is mild (grade 1) left ventricular diastolic dysfunction.   3. Normal right ventricular cavity size, with normal wall thickness, and normal systolic function. Tricuspid annular plane systolic excursion (TAPSE) is 2.4 cm (normal >=1.7 cm).   4. The left atrium is mildly dilated.  5. The right atrium is normal in size.   6. Estimated pulmonary artery systolic pressure is 29 mmHg, consistent with normal pulmonary artery pressure.   7. Mild left ventricular hypertrophy.   8. Technically difficult image quality.   9. There is calcification of the mitral valve annulus.  10. There is increased LV mass and concentric hypertrophy.  11. No pericardial effusion seen.  12. No prior echocardiogram is available for comparison.    ________________________________________________________________________________________  FINDINGS:     Left Ventricle:  The left ventricular cavity is normal in size. Left ventricular systolic function is hyperdynamic. There are no regional wall motion abnormalities seen. There is mild (grade 1) left ventricular diastolic dysfunction. Mild left ventricular hypertrophy. There is increased LV mass and concentric hypertrophy.     Right Ventricle:  The right ventricular cavity is normal in size, with normal wall thickness and normal systolic function. Tricuspid annular plane systolic excursion (TAPSE) is 2.4 cm (normal >=1.7 cm).     Left Atrium:  The left atrium is mildly dilated with an indexed volume of 41 ml/m².     Right Atrium:  The right atrium is normal in size.     Interatrial Septum:  The interatrial septum appears intact.     Aortic Valve:  The aortic valve is tricuspid with normal leaflet excursion. There is no aortic valve stenosis. There is no evidence of aortic regurgitation.     Mitral Valve:  Structurally normal mitral valve withnormal leaflet excursion. There is calcification of the mitral valve annulus. There is no mitral valve stenosis. There is trace mitral regurgitation.     Tricuspid Valve:  Structurally normal tricuspid valve with normal leaflet excursion. There is trace tricuspid regurgitation. Estimated pulmonary artery systolic pressure is 29 mmHg, consistent with normal pulmonary artery pressure.     Pulmonic Valve:  Structurally normal pulmonic valve with normal leaflet excursion. There is trace pulmonic regurgitation.     Aorta:  The aortic root appears normal in size.     Pericardium:  No pericardial effusion seen.     ____________________________________________________________________  QUANTITATIVE DATA:  Left Ventricle Measurements: (Indexed to BSA)     IVSd (2D):   0.9 cm  LVPWd (2D):  1.0 cm  LVIDd (2D):  4.2 cm  LVIDs (2D):  2.4 cm  LV Mass:     135 g  96.6 g/m²  LV Vol d, MOD A2C: 30.5 ml 21.88 ml/m²  LV Vol s, MOD A2C: 9.9 ml  7.12 ml/m²     MV E Vmax: 0.65 m/s  MV A Vmax: 1.22 m/s  MV E/A:  0.54  MV DT:     536 msec    Aorta Measurements: (Normal range) (Indexed to BSA)     Ao Root 2.9 cm       Left Atrium Measurements: (Indexed to BSA)  LA Diam 2D:        4.04 cm  LA Vol s, MOD A2C: 54.45 ml.  LA Vol BP:         57.0 ml.  41 ml/m².    Right Ventricle Measurements:     TAPSE: 2.4 cm       LVOT / RVOT/ Qp/Qs Data: (Indexed to BSA)  LVOT Diameter: 2.00 cm  LVOT Vmax:     1.61 m/s  LVOT VTI:      26.77 cm  LVOT SV:       83.8 ml  60.02 ml/m²    Aortic Valve Measurements:  AV Vmax:            1.6 m/s  AV Peak Gradient:       10.8 mmHg  AV Mean Gradient:       5.5 mmHg  AV VTI:                 23.6 cm  AV VTI Ratio:           1.13  AoV EOA, Contin:        3.54 cm²  AoV EOA, Contin i:      2.54 cm²/m²  AoV Dimensionless Index 1.13    Mitral Valve Measurements:     MV E Vmax: 0.7 m/s  MV A Vmax: 1.2 m/s  MV E/A:    0.5       Tricuspid Valve Measurements:     TR Vmax:          2.5 m/s  TR Peak Gradient: 25.5 mmHg  RA Pressure:      3 mmHg  PASP:             29 mmHg    ________________________________________________________________________________________  Electronically signed on 4/16/2024 at 8:03:54 AM by Eduarda Casas MD         *** Final ***

## 2024-04-18 NOTE — PROGRESS NOTE ADULT - PROBLEM SELECTOR PLAN 2
Hx of COPD not on home O2, and current smoker  -CT chest shows Loss of height of multiple thoracic and lumbar spine vertebral bodies which are of indeterminate age. Dominant cluster of nodular and patchy opacities within the superior segment of the right lower lobe suggestive of infection with endobronchial spread. Emphysema  -cont Prednisone taper   -cont Albuterol PRN, Duonebs and Wixela (patient's home med)  -supplemental oxygen PRN   -Pulm Dr Villatoro following - She has moderate perioperative risk with Ariscat pulmonary assessment, Post OP ICU eval recommended by Pulm Hx of COPD not on home O2, and current smoker  -CT chest shows Loss of height of multiple thoracic and lumbar spine vertebral bodies which are of indeterminate age. Dominant cluster of nodular and patchy opacities within the superior segment of the right lower lobe suggestive of infection with endobronchial spread. Emphysema  -cont Prednisone taper  : on prednisone 20mg QD since 4/16 x 5 days   -cont Albuterol PRN, Duonebs and Wixela (patient's home med)  -supplemental oxygen PRN   -Pulm Dr Villatoro following - She has moderate perioperative risk with Ariscat pulmonary assessment, Post OP ICU eval recommended by Pulm

## 2024-04-18 NOTE — PROGRESS NOTE ADULT - PROBLEM SELECTOR PLAN 1
Pt presented s/p fall  -CT pelvis shows acute proximal left femoral fracture  - POD #1 left hip IM nailing 4/16   - Ortho following  - PT Recs KAMALJIT Pt presented s/p fall  -CT pelvis shows acute proximal left femoral fracture  - POD #2 left hip IM nailing 4/16   - Ortho following  - PT Recs KAMALJIT

## 2024-04-18 NOTE — PHARMACOTHERAPY INTERVENTION NOTE - COMMENTS
Recommended to discontinue piperacillin-tazobactam for PNA since patient completed duration of therapy as per ID. Patient complete 8 days of therapy.      Melva Dominguez, PharmD   Clinical Pharmacy Specialist, Infectious Diseases  Tele-Antim[facility] Stewardship Program (Tele-[facility])  Tele-ASP Phone: [phone]

## 2024-04-18 NOTE — PROGRESS NOTE ADULT - SUBJECTIVE AND OBJECTIVE BOX
HPI:  73-year-old female from home ambulates with a walker at baseline with PMH of HTN, COPD ( not on O2 at home), current smoker with 60 ppy hx, hypothyroidism, spinal stenosis urinary incontinence, presenting with left-sided lower back and hip pain after fall 2 weeks ago. Approximately 1-2 weeks ago the patient fell while walking and her leg "gave out" and since then she has had left-sided leg pain.  Patient reports she is able to walk with her cane/walker but feels very weak and unbalanced and has since fallen 2 more times. Pt also complains of SOB. Patient reporting chest pain since the fall today after her cane hit her chest. Patient denies any headache, dizziness, cough, congestion, fever, chills, N,V,D,Abd pain, numbness or tingling. In the ED, pt is comfortable, but complains of pain, NAD, pt is wheezing b/l with R sided crackles, pt was found to be hypoxic on RA to 80s saturating well on 1-2 L NC, hypertensive 175/105, wbc 9, Na 130. CTH and cervical spine shows No gross acute intracranial hemorrhage. No gross acute fracture cervical spine. CTC shows Loss of height of multiple thoracic and lumbar spine vertebral bodies which are of indeterminate age. Dominant cluster of nodular and patchy opacities within the superior segment of the right lower lobe suggestive of infection with endobronchial spread. Emphysema. Intrahepatic and extrahepatic biliary ductal dilatation is of unclear etiology. CT pelvis shows Acute proximal left femoral fracture,  Age-indeterminate severe compression fracture of the L5 vertebral body. Pt is admitted for L hip fracture/ SOB 2/2 PNA vs COPD exacerbation.    Off note: pt states that she lost about 30 lbs in the last 2 months. She is undergoing colonoscopy o/p to evaluate for weight loss. pt denies any blood in the stool. However, she has family history of colon ca in her father.  (09 Apr 2024 19:22)     Subjective:  Pt is seen at bedside.   pt is awake and lying in bed/out of bed to chair  pt seems comfortable and denies any complaints at this time    ROS:  14 point ROS negative except for above.    MEDICATIONS  (STANDING):  albuterol/ipratropium for Nebulization 3 milliLiter(s) Nebulizer every 6 hours  budesonide 160 MICROgram(s)/formoterol 4.5 MICROgram(s) Inhaler 2 Puff(s) Inhalation two times a day  chlorhexidine 2% Cloths 1 Application(s) Topical <User Schedule>  gabapentin 100 milliGRAM(s) Oral every 8 hours  heparin   Injectable 5000 Unit(s) SubCutaneous every 8 hours  levothyroxine 112 MICROGram(s) Oral daily  lidocaine   4% Patch 2 Patch Transdermal daily  losartan 50 milliGRAM(s) Oral daily  morphine ER Tablet 15 milliGRAM(s) Oral every 12 hours  naloxone Injectable 0.4 milliGRAM(s) IV Push once  nicotine -   7 mG/24Hr(s) Patch 1 Patch Transdermal daily  piperacillin/tazobactam IVPB.. 3.375 Gram(s) IV Intermittent every 8 hours  polyethylene glycol 3350 17 Gram(s) Oral daily  senna 2 Tablet(s) Oral at bedtime    MEDICATIONS  (PRN):  acetaminophen     Tablet .. 1000 milliGRAM(s) Oral every 8 hours PRN Mild Pain (1 - 3)  albuterol    90 MICROgram(s) HFA Inhaler 2 Puff(s) Inhalation every 6 hours PRN Bronchospasm  ALPRAZolam 1 milliGRAM(s) Oral every 12 hours PRN Anxiety  benzonatate 100 milliGRAM(s) Oral every 8 hours PRN Cough  bisacodyl 5 milliGRAM(s) Oral daily PRN Constipation  cyclobenzaprine 5 milliGRAM(s) Oral every 8 hours PRN Spasm  oxyCODONE    IR 5 milliGRAM(s) Oral every 4 hours PRN Moderate Pain (4 - 6)  oxyCODONE    IR 10 milliGRAM(s) Oral every 4 hours PRN Severe Pain (7 - 10)      Allergies    contrast media (iodine-based) (Unknown)    Intolerances    Vital Signs Last 24 Hrs  T(C): 36.7 (18 Apr 2024 05:15), Max: 36.7 (18 Apr 2024 05:15)  T(F): 98 (18 Apr 2024 05:15), Max: 98 (18 Apr 2024 05:15)  HR: 78 (18 Apr 2024 05:15) (78 - 108)  BP: 127/82 (18 Apr 2024 05:15) (127/82 - 170/107)  BP(mean): 93 (17 Apr 2024 13:30) (93 - 93)  RR: 17 (18 Apr 2024 05:15) (17 - 20)  SpO2: 97% (18 Apr 2024 05:15) (95% - 97%)    Parameters below as of 18 Apr 2024 05:15  Patient On (Oxygen Delivery Method): room air        PHYSICAL EXAM  General: adult in NAD  HEENT: clear oropharynx, anicteric sclera, pink conjunctiva  Neck: supple  CV: normal S1/S2 with no murmur rubs or gallops  Lungs: positive air movement b/l ant lungs,clear to auscultation, no wheezes, no rales  Abdomen: soft non-tender non-distended, no hepatosplenomegaly  Ext: no clubbing cyanosis or edema  Skin: no rashes and no petechiae  Neuro: alert and oriented X 4, no focal deficits  LABS:                                  10.5   10.34 )-----------( 389      ( 18 Apr 2024 07:19 )             32.6   04-18    136  |  100  |  18  ----------------------------<  110<H>  3.5   |  31  |  0.50    Ca    8.2<L>      18 Apr 2024 07:19    TPro  6.3  /  Alb  2.8<L>  /  TBili  0.4  /  DBili  x   /  AST  18  /  ALT  19  /  AlkPhos  124<H>  04-17

## 2024-04-18 NOTE — PROGRESS NOTE ADULT - PROBLEM SELECTOR PLAN 12
S/P IM nailing left hip   PT recs KAMALJIT pending [employer] auth for Dry [place] S/P IM nailing left hip   PT recs KAMALJIT pending [employer] auth for Dry [place]  TOV - removed miller at 8pm 4/17 -> bladder scan performed 2 times, allowed pt to try alternatives for toileting but unable, straight cath 3:30pm was 800cc, so bladder scan q 8hours ordered

## 2024-04-18 NOTE — PROGRESS NOTE ADULT - PROBLEM SELECTOR PROBLEM 11
Anxiety
Current smoker
Anxiety
Current smoker
Current smoker
Anxiety
Current smoker
Prophylactic measure
Anxiety
Prophylactic measure
Current smoker
Anxiety
Anxiety

## 2024-04-18 NOTE — PROGRESS NOTE ADULT - PROBLEM SELECTOR PLAN 11
hx of 60+ packs per year  -cont nicotine patches  -cessation counseling
History of anxiety, takes xanax 1mg (1-2tab) mg BID at home  - c/w home meds xanax 1mg BID PRN
hx of 60 + ppy  -cont nicotine patches  -cessation counseling.
hx of 60 + ppy  -cont nicotine patches  -cessation counseling.
hx of 60+ packs per year  -cont nicotine patches  -cessation counseling
History of anxiety, takes xanax 1mg (1-2tab) mg BID at home  - c/w home meds xanax 1mg BID PRN
History of anxiety, takes xanax 1mg (1-2tab) mg BID at home  - c/w home meds xanax 1mg BID PRN
-dvt ppx: Heparin
History of anxiety, takes xanax 1mg (1-2tab) mg BID at home  - c/w home meds xanax 1mg BID PRN
-dvt ppx: Heparin
History of anxiety, takes xanax 1mg (1-2tab) mg BID at home  - c/w home meds xanax 1mg BID PRN
hx of 60+ packs per year  -cont nicotine patches  -cessation counseling
History of anxiety, takes xanax 1mg (1-2tab) mg BID at home  - c/w home meds xanax 1mg BID PRN

## 2024-04-18 NOTE — PROGRESS NOTE ADULT - SUBJECTIVE AND OBJECTIVE BOX
Patient is seen and examined at the bed side, is afebrile. The Leukocytosis trended down to normal.       REVIEW OF SYSTEMS: All other review systems are negative      ALLERGIES: No Known Allergies      Vital Signs Last 24 Hrs  T(C): 36.7 (18 Apr 2024 05:15), Max: 36.7 (18 Apr 2024 05:15)  T(F): 98 (18 Apr 2024 05:15), Max: 98 (18 Apr 2024 05:15)  HR: 103 (18 Apr 2024 12:18) (78 - 108)  BP: 141/98 (18 Apr 2024 12:18) (127/82 - 142/89)  BP(mean): --  RR: 17 (18 Apr 2024 05:15) (17 - 18)  SpO2: 98% (18 Apr 2024 12:18) (96% - 99%)    Parameters below as of 18 Apr 2024 12:18  Patient On (Oxygen Delivery Method): nasal cannula  O2 Flow (L/min): 2      PHYSICAL EXAM:  GENERAL: Not in distress, back on oxygen via NC   CHEST/LUNG: Not using accessory muscles   HEART: s1 and s2 present  ABDOMEN:  Nontender and  Nondistended  EXTREMITIES: Left hip bandage in placed   CNS: Awake and Alert      LABS:                        10.5   10.34 )-----------( 389      ( 18 Apr 2024 07:19 )             32.6                           11.3   12.29 )-----------( 370      ( 17 Apr 2024 06:45 )             34.8         04-18    136  |  100  |  18  ----------------------------<  110<H>  3.5   |  31  |  0.50    Ca    8.2<L>      18 Apr 2024 07:19    TPro  6.3  /  Alb  2.8<L>  /  TBili  0.4  /  DBili  x   /  AST  18  /  ALT  19  /  AlkPhos  124<H>  04-17 04-17    136  |  99  |  17  ----------------------------<  85  3.6   |  30  |  0.55    Ca    8.5      17 Apr 2024 06:45  Phos  3.2     04-16  Mg     2.5     04-16    TPro  6.3  /  Alb  2.8<L>  /  TBili  0.4  /  DBili  x   /  AST  18  /  ALT  19  /  AlkPhos  124<H>  04-17            PT/INR - ( 10 Apr 2024 05:10 )   PT: 10.9 sec;   INR: 0.95 ratio       PTT - ( 10 Apr 2024 05:10 )  PTT:29.3 sec      MEDICATIONS  (STANDING):    albuterol/ipratropium for Nebulization 3 milliLiter(s) Nebulizer every 6 hours  enoxaparin Injectable 40 milliGRAM(s) SubCutaneous every 24 hours  levothyroxine 112 MICROGram(s) Oral daily  lidocaine   4% Patch 2 Patch Transdermal daily  losartan 50 milliGRAM(s) Oral daily  morphine ER Tablet 15 milliGRAM(s) Oral every 12 hours  naloxone Injectable 0.4 milliGRAM(s) IV Push once  nicotine -   7 mG/24Hr(s) Patch 1 Patch Transdermal daily  polyethylene glycol 3350 17 Gram(s) Oral daily  predniSONE   Tablet 20 milliGRAM(s) Oral daily  senna 2 Tablet(s) Oral at bedtime      RADIOLOGY & ADDITIONAL TESTS:    4/9/24 : CT Chest No Cont (04.09.24 @ 17:06) Axial CT images of the chest are obtained without intravenous administration of contrast.      IMPRESSION: Loss of height of multiple thoracic and lumbar spine vertebral bodies which are of indeterminate age. Clinical correlation for   back pain is recommended. Dedicated spinal MRI can be performed for complete evaluation.    Dominant cluster of nodular and patchy opacities within the superior segment of the right lower lobe suggestive of infection with   endobronchial spread. A 1-3 month follow-up noncontrast chest CT is recommended to ensure improvement/resolution.    Emphysema.    Intrahepatic and extrahepatic biliary ductal dilatation is of unclear etiology. Upper abdominal ultrasound can be performedfor further   evaluation as clinically warranted.      4/10/24: CT Head No Cont (04.10.24 @ 15:55) IMPRESSION: Age-appropriate involutional and ischemic gliotic changes. No   hemorrhage. No change since 4/9/2024.      4/9/24 : Xray Femur 2 Views, Left (04.09.24 @ 20:35) 1. Diffuse bony demineralization associated with a fracture along the   intertrochanteric line of the proximal left femur, with only slight upward migration of the distal segment, but less than one half bone width.  2. Degenerative arthropathy of the left knee.    4/9/24: Xray Pelvis AP only (04.09.24 @ 20:35) 1. Diffuse bony demineralization with a fracture along the   intertrochanteric line of the proximal left femur. No dislocation of the hips.        MICROBIOLOGY DATA:    Respiratory Viral Panel with COVID-19 by SHAHANA (04.16.24 @ 15:15)   Rapid RVP Result: Detected  SARS-CoV-2: NotDetec:    MRSA/MSSA PCR (04.11.24 @ 07:02)   MRSA PCR Result.: NotDetec:    Legionella pneumophila Antigen, Urine (04.10.24 @ 05:40)   Legionella Antigen, Urine: Negative:      Respiratory Viral Panel with COVID-19 by SHAHANA (04.09.24 @ 21:24)   Rapid RVP Result: NotDetec  SARS-CoV-2: NotDetec:

## 2024-04-18 NOTE — PROGRESS NOTE ADULT - TIME BILLING
- Review of records, telemetry, vital signs and daily labs.   - General and cardiovascular physical examination.  - Generation of cardiovascular treatment plan.  - Coordination of care.      Patient was seen and examined by me on 4/17/24,interim events noted,labs and radiology studies reviewed.  Deric Salinas MD,FACC.  [address] [place], [place] [id number].  [phone]
- Review of records, telemetry, vital signs and daily labs.   - General and cardiovascular physical examination.  - Generation of cardiovascular treatment plan.  - Coordination of care.      Patient was seen and examined by me on 4/14/24,interim events noted,labs and radiology studies reviewed.  Deric Salinas MD,FACC.  [address] [place], [place] [id number].  [phone]
- Review of records, telemetry, vital signs and daily labs.   - General and cardiovascular physical examination.  - Generation of cardiovascular treatment plan.  - Coordination of care.      Patient was seen and examined by me on 4/18/24,interim events noted,labs and radiology studies reviewed.  Deric Salinas MD,FACC.  [address] [place], [place] [id number].  [phone]
- Review of records, telemetry, vital signs and daily labs.   - General and cardiovascular physical examination.  - Generation of cardiovascular treatment plan.  - Coordination of care.      Patient was seen and examined by me on 4/16/24,interim events noted,labs and radiology studies reviewed.  Deric Salinas MD,FACC.  [address] [place], [place] [id number].  [phone]

## 2024-04-19 ENCOUNTER — TRANSCRIPTION ENCOUNTER (OUTPATIENT)
Age: 74
End: 2024-04-19

## 2024-04-19 VITALS
OXYGEN SATURATION: 90 % | RESPIRATION RATE: 18 BRPM | DIASTOLIC BLOOD PRESSURE: 88 MMHG | HEART RATE: 87 BPM | TEMPERATURE: 98 F | SYSTOLIC BLOOD PRESSURE: 152 MMHG

## 2024-04-19 LAB
ACHR BLOCK AB SER-ACNC: 40 % — HIGH (ref 0–25)
ANION GAP SERPL CALC-SCNC: 3 MMOL/L — LOW (ref 5–17)
BUN SERPL-MCNC: 15 MG/DL — SIGNIFICANT CHANGE UP (ref 7–18)
CALCIUM SERPL-MCNC: 8.1 MG/DL — LOW (ref 8.4–10.5)
CHLORIDE SERPL-SCNC: 102 MMOL/L — SIGNIFICANT CHANGE UP (ref 96–108)
CO2 SERPL-SCNC: 30 MMOL/L — SIGNIFICANT CHANGE UP (ref 22–31)
CREAT SERPL-MCNC: 0.36 MG/DL — LOW (ref 0.5–1.3)
EGFR: 106 ML/MIN/1.73M2 — SIGNIFICANT CHANGE UP
GLUCOSE SERPL-MCNC: 104 MG/DL — HIGH (ref 70–99)
HCT VFR BLD CALC: 29.1 % — LOW (ref 34.5–45)
HGB BLD-MCNC: 9.7 G/DL — LOW (ref 11.5–15.5)
MCHC RBC-ENTMCNC: 31.8 PG — SIGNIFICANT CHANGE UP (ref 27–34)
MCHC RBC-ENTMCNC: 33.3 GM/DL — SIGNIFICANT CHANGE UP (ref 32–36)
MCV RBC AUTO: 95.4 FL — SIGNIFICANT CHANGE UP (ref 80–100)
NRBC # BLD: 0 /100 WBCS — SIGNIFICANT CHANGE UP (ref 0–0)
PLATELET # BLD AUTO: 382 K/UL — SIGNIFICANT CHANGE UP (ref 150–400)
POTASSIUM SERPL-MCNC: 3.5 MMOL/L — SIGNIFICANT CHANGE UP (ref 3.5–5.3)
POTASSIUM SERPL-SCNC: 3.5 MMOL/L — SIGNIFICANT CHANGE UP (ref 3.5–5.3)
RBC # BLD: 3.05 M/UL — LOW (ref 3.8–5.2)
RBC # FLD: 13.3 % — SIGNIFICANT CHANGE UP (ref 10.3–14.5)
SODIUM SERPL-SCNC: 135 MMOL/L — SIGNIFICANT CHANGE UP (ref 135–145)
WBC # BLD: 10.18 K/UL — SIGNIFICANT CHANGE UP (ref 3.8–10.5)
WBC # FLD AUTO: 10.18 K/UL — SIGNIFICANT CHANGE UP (ref 3.8–10.5)

## 2024-04-19 PROCEDURE — 36415 COLL VENOUS BLD VENIPUNCTURE: CPT

## 2024-04-19 PROCEDURE — 92610 EVALUATE SWALLOWING FUNCTION: CPT

## 2024-04-19 PROCEDURE — 84439 ASSAY OF FREE THYROXINE: CPT

## 2024-04-19 PROCEDURE — 70551 MRI BRAIN STEM W/O DYE: CPT | Mod: MC

## 2024-04-19 PROCEDURE — 97150 GROUP THERAPEUTIC PROCEDURES: CPT

## 2024-04-19 PROCEDURE — 96374 THER/PROPH/DIAG INJ IV PUSH: CPT

## 2024-04-19 PROCEDURE — 80048 BASIC METABOLIC PNL TOTAL CA: CPT

## 2024-04-19 PROCEDURE — 83090 ASSAY OF HOMOCYSTEINE: CPT

## 2024-04-19 PROCEDURE — 86923 COMPATIBILITY TEST ELECTRIC: CPT

## 2024-04-19 PROCEDURE — 86850 RBC ANTIBODY SCREEN: CPT

## 2024-04-19 PROCEDURE — 86334 IMMUNOFIX E-PHORESIS SERUM: CPT

## 2024-04-19 PROCEDURE — 82550 ASSAY OF CK (CPK): CPT

## 2024-04-19 PROCEDURE — 80053 COMPREHEN METABOLIC PANEL: CPT

## 2024-04-19 PROCEDURE — 82378 CARCINOEMBRYONIC ANTIGEN: CPT

## 2024-04-19 PROCEDURE — 82746 ASSAY OF FOLIC ACID SERUM: CPT

## 2024-04-19 PROCEDURE — 97530 THERAPEUTIC ACTIVITIES: CPT

## 2024-04-19 PROCEDURE — 87640 STAPH A DNA AMP PROBE: CPT

## 2024-04-19 PROCEDURE — 83880 ASSAY OF NATRIURETIC PEPTIDE: CPT

## 2024-04-19 PROCEDURE — C1769: CPT

## 2024-04-19 PROCEDURE — 83921 ORGANIC ACID SINGLE QUANT: CPT

## 2024-04-19 PROCEDURE — 85730 THROMBOPLASTIN TIME PARTIAL: CPT

## 2024-04-19 PROCEDURE — 84550 ASSAY OF BLOOD/URIC ACID: CPT

## 2024-04-19 PROCEDURE — 86431 RHEUMATOID FACTOR QUANT: CPT

## 2024-04-19 PROCEDURE — 71250 CT THORAX DX C-: CPT | Mod: MC

## 2024-04-19 PROCEDURE — 86140 C-REACTIVE PROTEIN: CPT

## 2024-04-19 PROCEDURE — 84443 ASSAY THYROID STIM HORMONE: CPT

## 2024-04-19 PROCEDURE — 86041 ACETYLCHOLN RCPTR BNDNG ANTB: CPT

## 2024-04-19 PROCEDURE — C1713: CPT

## 2024-04-19 PROCEDURE — 86042 ACETYLCHOLN RCPTR BLCKG ANTB: CPT

## 2024-04-19 PROCEDURE — 85652 RBC SED RATE AUTOMATED: CPT

## 2024-04-19 PROCEDURE — 70450 CT HEAD/BRAIN W/O DYE: CPT | Mod: MC

## 2024-04-19 PROCEDURE — 84481 FREE ASSAY (FT-3): CPT

## 2024-04-19 PROCEDURE — 84156 ASSAY OF PROTEIN URINE: CPT

## 2024-04-19 PROCEDURE — 83935 ASSAY OF URINE OSMOLALITY: CPT

## 2024-04-19 PROCEDURE — 72141 MRI NECK SPINE W/O DYE: CPT | Mod: MC

## 2024-04-19 PROCEDURE — 94640 AIRWAY INHALATION TREATMENT: CPT

## 2024-04-19 PROCEDURE — 87899 AGENT NOS ASSAY W/OPTIC: CPT

## 2024-04-19 PROCEDURE — 85610 PROTHROMBIN TIME: CPT

## 2024-04-19 PROCEDURE — 99285 EMERGENCY DEPT VISIT HI MDM: CPT | Mod: 25

## 2024-04-19 PROCEDURE — 84630 ASSAY OF ZINC: CPT

## 2024-04-19 PROCEDURE — 84165 PROTEIN E-PHORESIS SERUM: CPT

## 2024-04-19 PROCEDURE — 82803 BLOOD GASES ANY COMBINATION: CPT

## 2024-04-19 PROCEDURE — 97116 GAIT TRAINING THERAPY: CPT

## 2024-04-19 PROCEDURE — 72146 MRI CHEST SPINE W/O DYE: CPT | Mod: MC

## 2024-04-19 PROCEDURE — 83930 ASSAY OF BLOOD OSMOLALITY: CPT

## 2024-04-19 PROCEDURE — 86200 CCP ANTIBODY: CPT

## 2024-04-19 PROCEDURE — 86304 IMMUNOASSAY TUMOR CA 125: CPT

## 2024-04-19 PROCEDURE — 84133 ASSAY OF URINE POTASSIUM: CPT

## 2024-04-19 PROCEDURE — 83735 ASSAY OF MAGNESIUM: CPT

## 2024-04-19 PROCEDURE — 86255 FLUORESCENT ANTIBODY SCREEN: CPT

## 2024-04-19 PROCEDURE — 72125 CT NECK SPINE W/O DYE: CPT | Mod: MC

## 2024-04-19 PROCEDURE — 76000 FLUOROSCOPY <1 HR PHYS/QHP: CPT

## 2024-04-19 PROCEDURE — 87521 HEPATITIS C PROBE&RVRS TRNSC: CPT

## 2024-04-19 PROCEDURE — 93306 TTE W/DOPPLER COMPLETE: CPT

## 2024-04-19 PROCEDURE — 86900 BLOOD TYPING SEROLOGIC ABO: CPT

## 2024-04-19 PROCEDURE — 0225U NFCT DS DNA&RNA 21 SARSCOV2: CPT

## 2024-04-19 PROCEDURE — 84145 PROCALCITONIN (PCT): CPT

## 2024-04-19 PROCEDURE — 81001 URINALYSIS AUTO W/SCOPE: CPT

## 2024-04-19 PROCEDURE — 84300 ASSAY OF URINE SODIUM: CPT

## 2024-04-19 PROCEDURE — 82306 VITAMIN D 25 HYDROXY: CPT

## 2024-04-19 PROCEDURE — 82570 ASSAY OF URINE CREATININE: CPT

## 2024-04-19 PROCEDURE — 82525 ASSAY OF COPPER: CPT

## 2024-04-19 PROCEDURE — 87641 MR-STAPH DNA AMP PROBE: CPT

## 2024-04-19 PROCEDURE — 84484 ASSAY OF TROPONIN QUANT: CPT

## 2024-04-19 PROCEDURE — 85027 COMPLETE CBC AUTOMATED: CPT

## 2024-04-19 PROCEDURE — 74176 CT ABD & PELVIS W/O CONTRAST: CPT | Mod: MC

## 2024-04-19 PROCEDURE — 97162 PT EVAL MOD COMPLEX 30 MIN: CPT

## 2024-04-19 PROCEDURE — 86738 MYCOPLASMA ANTIBODY: CPT

## 2024-04-19 PROCEDURE — 84540 ASSAY OF URINE/UREA-N: CPT

## 2024-04-19 PROCEDURE — 82607 VITAMIN B-12: CPT

## 2024-04-19 PROCEDURE — 84155 ASSAY OF PROTEIN SERUM: CPT

## 2024-04-19 PROCEDURE — 72170 X-RAY EXAM OF PELVIS: CPT

## 2024-04-19 PROCEDURE — 86038 ANTINUCLEAR ANTIBODIES: CPT

## 2024-04-19 PROCEDURE — 72192 CT PELVIS W/O DYE: CPT | Mod: MC

## 2024-04-19 PROCEDURE — 86596 VOLTAGE-GTD CA CHNL ANTB EA: CPT

## 2024-04-19 PROCEDURE — 82105 ALPHA-FETOPROTEIN SERUM: CPT

## 2024-04-19 PROCEDURE — 86300 IMMUNOASSAY TUMOR CA 15-3: CPT

## 2024-04-19 PROCEDURE — 86301 IMMUNOASSAY TUMOR CA 19-9: CPT

## 2024-04-19 PROCEDURE — 87449 NOS EACH ORGANISM AG IA: CPT

## 2024-04-19 PROCEDURE — 83519 RIA NONANTIBODY: CPT

## 2024-04-19 PROCEDURE — 85025 COMPLETE CBC W/AUTO DIFF WBC: CPT

## 2024-04-19 PROCEDURE — 86803 HEPATITIS C AB TEST: CPT

## 2024-04-19 PROCEDURE — 73552 X-RAY EXAM OF FEMUR 2/>: CPT

## 2024-04-19 PROCEDURE — 86901 BLOOD TYPING SEROLOGIC RH(D): CPT

## 2024-04-19 PROCEDURE — 84100 ASSAY OF PHOSPHORUS: CPT

## 2024-04-19 RX ORDER — OXYCODONE HYDROCHLORIDE 5 MG/1
1 TABLET ORAL
Qty: 42 | Refills: 0
Start: 2024-04-19 | End: 2024-04-25

## 2024-04-19 RX ORDER — LOSARTAN POTASSIUM 100 MG/1
1 TABLET, FILM COATED ORAL
Refills: 1 | DISCHARGE

## 2024-04-19 RX ORDER — MINERAL OIL
133 OIL (ML) MISCELLANEOUS ONCE
Refills: 0 | Status: COMPLETED | OUTPATIENT
Start: 2024-04-19 | End: 2024-04-19

## 2024-04-19 RX ORDER — ACETAMINOPHEN 500 MG
2 TABLET ORAL
Qty: 84 | Refills: 0
Start: 2024-04-19 | End: 2024-05-02

## 2024-04-19 RX ORDER — NICOTINE POLACRILEX 2 MG
1 GUM BUCCAL
Qty: 30 | Refills: 0
Start: 2024-04-19 | End: 2024-05-18

## 2024-04-19 RX ORDER — LOSARTAN POTASSIUM 100 MG/1
1 TABLET, FILM COATED ORAL
Qty: 0 | Refills: 0 | DISCHARGE
Start: 2024-04-19

## 2024-04-19 RX ORDER — IPRATROPIUM/ALBUTEROL SULFATE 18-103MCG
3 AEROSOL WITH ADAPTER (GRAM) INHALATION
Qty: 360 | Refills: 0
Start: 2024-04-19 | End: 2024-05-18

## 2024-04-19 RX ORDER — MORPHINE SULFATE 50 MG/1
1 CAPSULE, EXTENDED RELEASE ORAL
Qty: 14 | Refills: 0
Start: 2024-04-19 | End: 2024-04-25

## 2024-04-19 RX ORDER — LEVOTHYROXINE SODIUM 125 MCG
1 TABLET ORAL
Qty: 0 | Refills: 0 | DISCHARGE
Start: 2024-04-19

## 2024-04-19 RX ORDER — LIDOCAINE 4 G/100G
1 CREAM TOPICAL
Qty: 30 | Refills: 0
Start: 2024-04-19 | End: 2024-05-18

## 2024-04-19 RX ORDER — LACTULOSE 10 G/15ML
10 SOLUTION ORAL
Refills: 0 | Status: DISCONTINUED | OUTPATIENT
Start: 2024-04-19 | End: 2024-04-19

## 2024-04-19 RX ORDER — LEVOTHYROXINE SODIUM 125 MCG
1 TABLET ORAL
Refills: 5 | DISCHARGE

## 2024-04-19 RX ORDER — NALOXEGOL OXALATE 12.5 MG/1
1 TABLET, FILM COATED ORAL
Refills: 0 | DISCHARGE

## 2024-04-19 RX ORDER — LINACLOTIDE 145 UG/1
1 CAPSULE, GELATIN COATED ORAL
Refills: 0 | DISCHARGE

## 2024-04-19 RX ORDER — MORPHINE SULFATE 50 MG/1
1 CAPSULE, EXTENDED RELEASE ORAL
Refills: 0 | DISCHARGE

## 2024-04-19 RX ORDER — ALBUTEROL 90 UG/1
2 AEROSOL, METERED ORAL
Qty: 1 | Refills: 0
Start: 2024-04-19 | End: 2024-05-18

## 2024-04-19 RX ORDER — CYCLOBENZAPRINE HYDROCHLORIDE 10 MG/1
1 TABLET, FILM COATED ORAL
Qty: 42 | Refills: 0
Start: 2024-04-19 | End: 2024-05-02

## 2024-04-19 RX ORDER — NALOXONE HYDROCHLORIDE 4 MG/.1ML
1 SPRAY NASAL
Qty: 1 | Refills: 0
Start: 2024-04-19 | End: 2024-04-19

## 2024-04-19 RX ORDER — OXYCODONE AND ACETAMINOPHEN 5; 325 MG/1; MG/1
1 TABLET ORAL
Refills: 0 | DISCHARGE

## 2024-04-19 RX ADMIN — LACTULOSE 10 GRAM(S): 10 SOLUTION ORAL at 02:31

## 2024-04-19 RX ADMIN — ENOXAPARIN SODIUM 40 MILLIGRAM(S): 100 INJECTION SUBCUTANEOUS at 13:59

## 2024-04-19 RX ADMIN — OXYCODONE HYDROCHLORIDE 10 MILLIGRAM(S): 5 TABLET ORAL at 14:03

## 2024-04-19 RX ADMIN — Medication 1000 MILLIGRAM(S): at 10:32

## 2024-04-19 RX ADMIN — Medication 1 MILLIGRAM(S): at 02:31

## 2024-04-19 RX ADMIN — Medication 3 MILLILITER(S): at 08:41

## 2024-04-19 RX ADMIN — LOSARTAN POTASSIUM 50 MILLIGRAM(S): 100 TABLET, FILM COATED ORAL at 05:52

## 2024-04-19 RX ADMIN — Medication 3 MILLILITER(S): at 15:03

## 2024-04-19 RX ADMIN — Medication 1000 MILLIGRAM(S): at 12:24

## 2024-04-19 RX ADMIN — Medication 1 ENEMA: at 02:32

## 2024-04-19 RX ADMIN — OXYCODONE HYDROCHLORIDE 10 MILLIGRAM(S): 5 TABLET ORAL at 00:39

## 2024-04-19 RX ADMIN — Medication 112 MICROGRAM(S): at 05:52

## 2024-04-19 RX ADMIN — MORPHINE SULFATE 15 MILLIGRAM(S): 50 CAPSULE, EXTENDED RELEASE ORAL at 05:57

## 2024-04-19 RX ADMIN — OXYCODONE HYDROCHLORIDE 10 MILLIGRAM(S): 5 TABLET ORAL at 01:39

## 2024-04-19 RX ADMIN — LIDOCAINE 2 PATCH: 4 CREAM TOPICAL at 01:30

## 2024-04-19 RX ADMIN — Medication 133 MILLILITER(S): at 11:04

## 2024-04-19 RX ADMIN — OXYCODONE HYDROCHLORIDE 5 MILLIGRAM(S): 5 TABLET ORAL at 11:00

## 2024-04-19 RX ADMIN — OXYCODONE HYDROCHLORIDE 5 MILLIGRAM(S): 5 TABLET ORAL at 10:45

## 2024-04-19 NOTE — CHART NOTE - NSCHARTNOTEFT_GEN_A_CORE
EVENT: Blader scan 209 ml, nurse reporting stool stuck in pt's rectum and pt c/o pain to rectal area despite enema given earlier    BRIEF HPI: 73-year-old female from home ambulates with a walker at baseline with PMH of HTN, COPD (not on O2 at home), current smoker with 60 ppy hx, hypothyroidism, spinal stenosis, urinary incontinence, presenting with left-sided lower back and hip pain. CTH and cervical spine shows No gross acute intracranial hemorrhage.  CT pelvis shows acute proximal left femoral fracture. Pt is admitted for L hip fracture and COPD exacerbation 2/2 PNA. s/p Left hip IM nailing 4/16, occasional coughing but SOB or hypoxic. Eli removed 4/17  8pm. Failed TOV, straight cath at 3:30pm was 800cc, now bladder scan 209 ml and c/o stool stuck in rectum.    OBJECTIVE:  Vital Signs Last 24 Hrs  T(C): 36.7 (18 Apr 2024 21:15), Max: 36.7 (18 Apr 2024 05:15)  T(F): 98.1 (18 Apr 2024 21:15), Max: 98.1 (18 Apr 2024 21:15)  HR: 103 (18 Apr 2024 21:15) (78 - 108)  BP: 112/69 (18 Apr 2024 21:15) (112/69 - 142/89)  BP(mean): --  RR: 18 (18 Apr 2024 21:15) (17 - 18)  SpO2: 100% (18 Apr 2024 21:15) (97% - 100%)    Parameters below as of 18 Apr 2024 21:15  Patient On (Oxygen Delivery Method): nasal cannula  O2 Flow (L/min): 2    FOCUSED PHYSICAL EXAM:  GEN: Thin, frail, elderly female  GI: Abd flat, soft, + BS, non tender. + stool in rectum  RESP: Inc with c/o rectal pain  CV: S1 S2, regular    LABS:                        10.5   10.34 )-----------( 389      ( 18 Apr 2024 07:19 )             32.6     04-18    136  |  100  |  18  ----------------------------<  110<H>  3.5   |  31  |  0.50    Ca    8.2<L>      18 Apr 2024 07:19    TPro  6.3  /  Alb  2.8<L>  /  TBili  0.4  /  DBili  x   /  AST  18  /  ALT  19  /  Alk Phos  124<H>  04-17    IMAGING:  ACC: [id number] EXAM:  CT ABDOMEN AND PELVIS   ORDERED BY: TAMMI CHRISTIE   PROCEDURE DATE:  04/11/2024    IMPRESSION:  Evaluation of the solid organs, vascular structures and GI tract is   limited without oral and IV contrast. Limited evaluation for malignancy. Distended bladder. Mild hydronephrosis may be related to the bladder   distention. Moderate constipation.    PLAN:   Moderate amount of solid brown stool disimpacted  Enema given during disimpaction  Lactulose Syrup 10 Gram(s) Oral two times a day, stat now  Cont polyethylene glycol 3350 17 Gram(s) Oral daily  Cont senna 2 Tablet(s) Oral at bedtime  Cont bisacodyl 5 viry GRAM(s) Oral daily PRN Constipation    FOLLOW UP / RESULT: stool count, effect of medication

## 2024-04-19 NOTE — PROVIDER CONTACT NOTE (OTHER) - ACTION/TREATMENT ORDERED:
NP Quoc CULVER at bedside for manual disimpaction, and new order received for lactulose and stool count
straight cath

## 2024-04-19 NOTE — DISCHARGE NOTE NURSING/CASE MANAGEMENT/SOCIAL WORK - PATIENT PORTAL LINK FT
You can access the FollowMyHealth Patient Portal offered by [facility] by registering at the following website: [email or web address]. By joining [facility]’s FollowMyHealth portal, you will also be able to view your health information using other applications (apps) compatible with our system.

## 2024-04-19 NOTE — PROGRESS NOTE ADULT - SUBJECTIVE AND OBJECTIVE BOX
73yFemale    Diagnosis:  S/p IM nailing Left hip fx POD#3    24hr interval/acute events:  Pt complaint of cough. Patient tested positive for adenovirus on 4/16/24.  Pain is moderate; localized to the LLE and well controlled with medication.  Denies cp, sob, palpitations, paresthesias, nvd.    Vital Signs Last 24 Hrs  T(C): 36.3 (19 Apr 2024 05:54), Max: 36.7 (18 Apr 2024 21:15)  T(F): 97.4 (19 Apr 2024 05:54), Max: 98.1 (18 Apr 2024 21:15)  HR: 83 (19 Apr 2024 05:54) (83 - 108)  BP: 128/82 (19 Apr 2024 05:54) (112/69 - 142/89)  BP(mean): 97 (19 Apr 2024 05:54) (97 - 97)  RR: 18 (19 Apr 2024 05:54) (18 - 18)  SpO2: 95% (19 Apr 2024 05:54) (95% - 100%)    Parameters below as of 19 Apr 2024 05:54  Patient On (Oxygen Delivery Method): room air      Physical Exam:    General: AAOx3, in NAD, resting comfortably in bed.  Left hip:    Skin pink, warm LLE.  In nl. alignment.   Dressing is C/D/I.    No ct, calves soft  2+pulses  NVI silt  5/5 strength ehl/ta/gs b/l.                                                                    9.7    10.18 )-----------( 382      ( 19 Apr 2024 06:45 )             29.1   04-19    135  |  102  |  15  ----------------------------<  104<H>  3.5   |  30  |  0.36<L>    Ca    8.1<L>      19 Apr 2024 06:45            Impression:  73yFemale S/p IM nailing Left hip fx POD#3  Plan:  -  Continue pain management and home meds  -  DVT prophylaxis with Lovenox  -  Daily Physical Therapy: WBAT on LLE with appropriate assistive device  -  Discharge planning: Rehab pending  -  Encouraged use of Incentive Spirometry   -  Albumin low, patient refused vitamin supplementation.   -  Case d/w Dr. Lang

## 2024-04-19 NOTE — PROGRESS NOTE ADULT - REASON FOR ADMISSION
L hip fracture

## 2024-04-19 NOTE — PROGRESS NOTE ADULT - NUTRITIONAL ASSESSMENT
This patient has been assessed with a concern for Malnutrition and has been determined to have a diagnosis/diagnoses of Severe protein-calorie malnutrition and Underweight (BMI < 19).    This patient is being managed with:   Diet Regular-  Entered: Apr 17 2024 10:01AM  
73-year-old female from home ambulates with a walker at baseline with PMHx of HTN, COPD (not on O2 at home), current smoker with 60 ppy hx, hypothyroidism, spinal stenosis, urinary incontinence, presenting with left-sided lower back and hip pain. CTH and cervical spine shows No gross acute intracranial hemorrhage.  CT pelvis shows acute proximal left femoral fracture. Pt is admitted for L hip fracture and COPD exacerbation 2/2 PNA.            Hip fracture, left.   ·  Plan: -s/p fall  -CT pelvis shows acute proximal left femoral fracture    - Overall this patient is at    intermediaterisk (for cardiac death, nonfatal myocardial infarction, and nonfatal cardiac arrest perioperatively for this         intermediate risk procedure).     The patient is however without evidence of ACS, Decompensated Heart Failure,Obstructive Valvular Heart disease or Unstable arrhythmia.   There  are  no further recommendation for risk stratifying imaging/stress testing prior to planned surgery          respiratory failure with hypoxia.   ·  Plan: hx of COPD not on home O2, and current smoker  -CT chest shows Loss of height of multiple thoracic and lumbar spine vertebral bodies which are of indeterminate age. Dominant cluster of nodular and patchy opacities within the superior segment of the right lower lobe suggestive of infection with endobronchial spread. Emphysema  -cont Prednisone taper   -cont Albuterol PRN, Duonebs and Wixela (patient's home med)           HTN (hypertension).   ·  Plan: Monitor BP  pain control  c/w current treatment
This patient has been assessed with a concern for Malnutrition and has been determined to have a diagnosis/diagnoses of Severe protein-calorie malnutrition and Underweight (BMI < 19).    This patient is being managed with:   Diet Regular-  Entered: Apr 17 2024 10:01AM  
This patient has been assessed with a concern for Malnutrition and has been determined to have a diagnosis/diagnoses of Severe protein-calorie malnutrition and Underweight (BMI < 19).    This patient is being managed with:   Diet Regular-  Entered: Apr 17 2024 10:01AM    This patient has been assessed with a concern for Malnutrition and has been determined to have a diagnosis/diagnoses of Severe protein-calorie malnutrition and Underweight (BMI < 19).    This patient is being managed with:   Diet Regular-  Entered: Apr 17 2024 10:01AM

## 2024-04-19 NOTE — PROVIDER CONTACT NOTE (OTHER) - BACKGROUND
admitted for surgery
Patient received enema at beginning of shift with small liquid bowel movement noted

## 2024-04-19 NOTE — PROGRESS NOTE ADULT - SUBJECTIVE AND OBJECTIVE BOX
Time of Visit:  Patient seen and examined. pat seen earlier today     MEDICATIONS  (STANDING):  albuterol/ipratropium for Nebulization 3 milliLiter(s) Nebulizer every 6 hours  enoxaparin Injectable 40 milliGRAM(s) SubCutaneous every 24 hours  lactulose Syrup 10 Gram(s) Oral two times a day  levothyroxine 112 MICROGram(s) Oral daily  lidocaine   4% Patch 2 Patch Transdermal daily  losartan 50 milliGRAM(s) Oral daily  morphine ER Tablet 15 milliGRAM(s) Oral every 12 hours  naloxone Injectable 0.4 milliGRAM(s) IV Push once  nicotine -   7 mG/24Hr(s) Patch 1 Patch Transdermal daily  polyethylene glycol 3350 17 Gram(s) Oral daily  predniSONE   Tablet 20 milliGRAM(s) Oral daily  senna 2 Tablet(s) Oral at bedtime      MEDICATIONS  (PRN):  acetaminophen     Tablet .. 1000 milliGRAM(s) Oral every 8 hours PRN Mild Pain (1 - 3)  albuterol    90 MICROgram(s) HFA Inhaler 2 Puff(s) Inhalation every 6 hours PRN Bronchospasm  ALPRAZolam 1 milliGRAM(s) Oral every 12 hours PRN Anxiety  benzonatate 100 milliGRAM(s) Oral every 8 hours PRN Cough  bisacodyl 5 milliGRAM(s) Oral daily PRN Constipation  cyclobenzaprine 5 milliGRAM(s) Oral every 8 hours PRN Spasm  oxyCODONE    IR 10 milliGRAM(s) Oral every 4 hours PRN Severe Pain (7 - 10)  oxyCODONE    IR 5 milliGRAM(s) Oral every 4 hours PRN Moderate Pain (4 - 6)       Medications up to date at time of exam.      PHYSICAL EXAMINATION:  Patient has no new complaints.  GENERAL: The patient  in no apparent distress.     Vital Signs Last 24 Hrs  T(C): 36.7 (19 Apr 2024 15:51), Max: 37.1 (19 Apr 2024 14:43)  T(F): 98 (19 Apr 2024 15:51), Max: 98.7 (19 Apr 2024 14:43)  HR: 87 (19 Apr 2024 15:51) (83 - 103)  BP: 152/88 (19 Apr 2024 15:51) (112/69 - 152/88)  BP(mean): 97 (19 Apr 2024 05:54) (97 - 97)  RR: 18 (19 Apr 2024 15:51) (18 - 18)  SpO2: 90% (19 Apr 2024 15:51) (90% - 100%)    Parameters below as of 19 Apr 2024 15:51  Patient On (Oxygen Delivery Method): room air       (if applicable)    Chest Tube (if applicable)    HEENT: Head is normocephalic and atraumatic. Extraocular muscles are intact. Mucous membranes are moist.     NECK: Supple, no palpable adenopathy.    LUNGS: Fair bilateral berath sounds  no wheezing, rales, or rhonchi.    HEART: Regular rate and rhythm without murmur.    ABDOMEN: Soft, nontender, and nondistended.  No hepatosplenomegaly is noted.    : No painful voiding, no pelvic pain    EXTREMITIES: Without any cyanosis, clubbing, rash, lesions or edema.    NEUROLOGIC: Awake, alert, oriented, grossly intact    SKIN: Warm, dry, good turgor.      LABS:                        9.7    10.18 )-----------( 382      ( 19 Apr 2024 06:45 )             29.1     04-19    135  |  102  |  15  ----------------------------<  104<H>  3.5   |  30  |  0.36<L>    Ca    8.1<L>      19 Apr 2024 06:45        Urinalysis Basic - ( 19 Apr 2024 06:45 )    Color: x / Appearance: x / SG: x / pH: x  Gluc: 104 mg/dL / Ketone: x  / Bili: x / Urobili: x   Blood: x / Protein: x / Nitrite: x   Leuk Esterase: x / RBC: x / WBC x   Sq Epi: x / Non Sq Epi: x / Bacteria: x      MICROBIOLOGY: (if applicable)    RADIOLOGY & ADDITIONAL STUDIES:  EKG:   CXR:  ECHO:    IMPRESSION: 73y Female PAST MEDICAL & SURGICAL HISTORY:  Compression fracture      HTN (hypertension)      Hypothyroidism      COPD (chronic obstructive pulmonary disease)      Spinal stenosis      Current smoker      COPD with pneumonia      No significant past surgical history      H/O foot surgery       p/w         Impression: This is a 72 Y/O Female with s/p multiple Falls at Home and sustained Left Hip fracture . Presented with complains of episode of SOB , hypoxia in ED with o2 saturation 80s room air due to Acute hypoxic respiratory failure secondary to combination of Right lung Pneumonia showing on CT chest   and COPD exacerbation , Active smoker . Negative swab for  Covid. Negative UA for legionella .     Suggestion:  O2 saturation 97% room air at rest . So far no SOB , room air . Can have Oxygen supplementation 2L NC if needed  .  Continue Duoneb via nebulization Q 6 hours . Do not give at the same time with PRN Albuterol 90 mcg 2 puffs Q 6 Hours.   On Prednisone 20 mg Oral daily.   Orthopedic following .   Isolation: Droplet / Contact for Adenovirus .

## 2024-04-19 NOTE — PROVIDER CONTACT NOTE (OTHER) - SITUATION
bladder scan of 463ml, denies urgency or "feeling" to urinate
Patient noted with stool stuck/impacted in rectum and patient c/o rectal pain, Abdomen distended bowel sounds normoactive/positive in all 4 quadarants

## 2024-04-19 NOTE — PROGRESS NOTE ADULT - PROVIDER SPECIALTY LIST ADULT
Cardiology
Cardiology
Heme/Onc
Heme/Onc
Infectious Disease
Internal Medicine
Orthopedics
Pulmonology
Heme/Onc
Heme/Onc
Infectious Disease
Orthopedics
Orthopedics
Pulmonology
Pulmonology
Infectious Disease
Internal Medicine
Nephrology
Neurology
Orthopedics
Orthopedics
Pulmonology
Heme/Onc
Nephrology
Cardiology Preventive
Internal Medicine
Pain Medicine
Cardiology
Cardiology
Internal Medicine
Pain Medicine
Pain Medicine
Internal Medicine

## 2024-04-19 NOTE — PROVIDER CONTACT NOTE (OTHER) - ASSESSMENT
denies urgency or "feeling" to urinate, bladder spasms, pain, or bladder fullness. Abd distended, reports feeling constipated last  BM she recalls is 2 days ago. passing flatus, no nausea
Patient s/p miller removal 4/18- patient TOV, Bladder scan completed 209

## 2024-04-19 NOTE — DISCHARGE NOTE NURSING/CASE MANAGEMENT/SOCIAL WORK - NSDCPEFALRISK_GEN_ALL_CORE
For information on Fall & Injury Prevention, visit: [email or web address] OR  [email or web address] OR  https://www.cdc.gov/steadi/patient.html

## 2024-04-24 LAB
AMPHIPHYSIN AB TITR SER: NEGATIVE — SIGNIFICANT CHANGE UP
CRMP-5-IGG WESTERN BLOT: NEGATIVE — SIGNIFICANT CHANGE UP
GLIAL NUC TYPE 1 AB TITR SER: NEGATIVE — SIGNIFICANT CHANGE UP
HU1 AB TITR SER: NEGATIVE — SIGNIFICANT CHANGE UP
HU2 AB TITR SER IF: NEGATIVE — SIGNIFICANT CHANGE UP
HU3 AB TITR SER: NEGATIVE — SIGNIFICANT CHANGE UP
IFA NOTES: SIGNIFICANT CHANGE UP
P/Q TYPE ANTIBODY: 0 NMOL/L — SIGNIFICANT CHANGE UP
PARANEOPLASTIC AB SER-IMP: SIGNIFICANT CHANGE UP
PCA-1 AB TITR SER: NEGATIVE — SIGNIFICANT CHANGE UP
PCA-2 AB TITR SER: NEGATIVE — SIGNIFICANT CHANGE UP
PCA-TR AB TITR SER: NEGATIVE — SIGNIFICANT CHANGE UP
VGKC AB SER-SCNC: 0.05 NMOL/L — HIGH

## (undated) DEVICE — DRAPE LEGGINGS 6" CUFF 28X43"

## (undated) DEVICE — DRSG COBAN 6"

## (undated) DEVICE — GLV 7.5 PROTEXIS (WHITE)

## (undated) DEVICE — DRAPE IOBAN 13" X 13"

## (undated) DEVICE — DRILL BIT STRYKER ORTHO LOKG 4.2X360MM

## (undated) DEVICE — DRSG CURITY GAUZE SPONGE 4 X 4" 12-PLY

## (undated) DEVICE — SOL IRR POUR NS 0.9% 1500ML

## (undated) DEVICE — POSITIONER FOAM MATTRESS PAD CONVOLUTED (PINK)

## (undated) DEVICE — SUT POLYSORB 2-0 30" GS-10 UNDYED

## (undated) DEVICE — PREP BETADINE KIT

## (undated) DEVICE — SUT POLYSORB 1 18" UNDYED

## (undated) DEVICE — WARMING BLANKET FULL ADULT

## (undated) DEVICE — DRAPE SHOWER CURTAIN ISOLATION

## (undated) DEVICE — DRSG MEDIPORE DRESS IT 7-7/8 X 11"

## (undated) DEVICE — PREP BETADINE SPONGE STICKS

## (undated) DEVICE — GLV 8 PROTEXIS (CREAM) MICRO

## (undated) DEVICE — WARMING BLANKET UPPER ADULT

## (undated) DEVICE — TAPE SILK 3"

## (undated) DEVICE — STAPLER SKIN VISI-STAT 35 WIDE

## (undated) DEVICE — DRSG ADAPTIC 3 X 8"

## (undated) DEVICE — VENODYNE/SCD SLEEVE CALF MEDIUM

## (undated) DEVICE — DRSG COMBINE 5X9"

## (undated) DEVICE — PACK MINOR NO DRAPE